# Patient Record
Sex: FEMALE | Race: BLACK OR AFRICAN AMERICAN | NOT HISPANIC OR LATINO | Employment: UNEMPLOYED | URBAN - METROPOLITAN AREA
[De-identification: names, ages, dates, MRNs, and addresses within clinical notes are randomized per-mention and may not be internally consistent; named-entity substitution may affect disease eponyms.]

---

## 2023-01-10 PROBLEM — Z90.3 S/P GASTRIC SLEEVE PROCEDURE: Status: ACTIVE | Noted: 2023-01-10

## 2023-01-10 PROBLEM — E78.5 HYPERLIPIDEMIA: Status: ACTIVE | Noted: 2023-01-10

## 2023-04-12 PROBLEM — E78.00 HYPERCHOLESTEREMIA: Status: ACTIVE | Noted: 2019-04-16

## 2023-04-12 PROBLEM — M54.41 CHRONIC BILATERAL LOW BACK PAIN WITH RIGHT-SIDED SCIATICA: Status: ACTIVE | Noted: 2022-10-21

## 2023-04-12 PROBLEM — E66.01 CLASS 2 SEVERE OBESITY DUE TO EXCESS CALORIES WITH SERIOUS COMORBIDITY AND BODY MASS INDEX (BMI) OF 35.0 TO 35.9 IN ADULT: Status: ACTIVE | Noted: 2023-04-12

## 2023-04-12 PROBLEM — G89.29 CHRONIC BILATERAL LOW BACK PAIN WITHOUT SCIATICA: Status: ACTIVE | Noted: 2022-10-21

## 2023-04-12 PROBLEM — Q61.00: Status: ACTIVE | Noted: 2023-04-12

## 2023-04-12 PROBLEM — E66.812 CLASS 2 SEVERE OBESITY DUE TO EXCESS CALORIES WITH SERIOUS COMORBIDITY AND BODY MASS INDEX (BMI) OF 35.0 TO 35.9 IN ADULT: Status: ACTIVE | Noted: 2023-04-12

## 2023-04-12 PROBLEM — M25.561 CHRONIC PAIN OF BOTH KNEES: Status: ACTIVE | Noted: 2022-04-22

## 2023-04-12 PROBLEM — M54.50 CHRONIC BILATERAL LOW BACK PAIN WITHOUT SCIATICA: Status: ACTIVE | Noted: 2022-10-21

## 2023-04-12 PROBLEM — J30.9 CHRONIC ALLERGIC RHINITIS: Status: ACTIVE | Noted: 2019-04-16

## 2023-04-12 PROBLEM — F41.8 MIXED ANXIETY AND DEPRESSIVE DISORDER: Status: ACTIVE | Noted: 2023-04-12

## 2023-04-12 PROBLEM — E88.810 METABOLIC SYNDROME: Status: ACTIVE | Noted: 2023-04-12

## 2023-06-13 PROBLEM — J32.9 SINUSITIS: Status: ACTIVE | Noted: 2023-06-13

## 2023-07-12 PROBLEM — E66.09 CLASS 1 OBESITY DUE TO EXCESS CALORIES WITH SERIOUS COMORBIDITY AND BODY MASS INDEX (BMI) OF 32.0 TO 32.9 IN ADULT: Status: ACTIVE | Noted: 2023-04-12

## 2023-07-12 PROBLEM — E66.811 CLASS 1 OBESITY DUE TO EXCESS CALORIES WITH SERIOUS COMORBIDITY AND BODY MASS INDEX (BMI) OF 32.0 TO 32.9 IN ADULT: Status: ACTIVE | Noted: 2023-04-12

## 2023-12-29 ENCOUNTER — OFFICE VISIT (OUTPATIENT)
Dept: FAMILY MEDICINE | Facility: CLINIC | Age: 58
End: 2023-12-29
Payer: COMMERCIAL

## 2023-12-29 VITALS
RESPIRATION RATE: 18 BRPM | TEMPERATURE: 98 F | OXYGEN SATURATION: 99 % | WEIGHT: 219.88 LBS | BODY MASS INDEX: 30.78 KG/M2 | HEIGHT: 71 IN | HEART RATE: 68 BPM

## 2023-12-29 DIAGNOSIS — B37.31 VAGINAL YEAST INFECTION: ICD-10-CM

## 2023-12-29 DIAGNOSIS — R32 URINARY INCONTINENCE, UNSPECIFIED TYPE: ICD-10-CM

## 2023-12-29 DIAGNOSIS — M25.561 CHRONIC PAIN OF RIGHT KNEE: ICD-10-CM

## 2023-12-29 DIAGNOSIS — G89.29 CHRONIC PAIN OF RIGHT KNEE: ICD-10-CM

## 2023-12-29 DIAGNOSIS — R30.0 DYSURIA: Primary | ICD-10-CM

## 2023-12-29 LAB
BILIRUB UR QL STRIP: NEGATIVE
CLARITY UR: CLEAR
COLOR UR: NORMAL
GLUCOSE UR QL STRIP: NEGATIVE
HGB UR QL STRIP: NEGATIVE
KETONES UR QL STRIP: NEGATIVE
LEUKOCYTE ESTERASE UR QL STRIP: NEGATIVE
NITRITE UR QL STRIP: NEGATIVE
PH UR STRIP: 7 [PH] (ref 5–8)
PROT UR QL STRIP: NEGATIVE
SP GR UR STRIP: 1.01 (ref 1–1.03)
URN SPEC COLLECT METH UR: NORMAL

## 2023-12-29 PROCEDURE — 99999 PR PBB SHADOW E&M-EST. PATIENT-LVL V: CPT | Mod: PBBFAC,,, | Performed by: NURSE PRACTITIONER

## 2023-12-29 PROCEDURE — 99214 OFFICE O/P EST MOD 30 MIN: CPT | Mod: S$PBB,,, | Performed by: NURSE PRACTITIONER

## 2023-12-29 PROCEDURE — 99215 OFFICE O/P EST HI 40 MIN: CPT | Mod: PBBFAC,PO | Performed by: NURSE PRACTITIONER

## 2023-12-29 PROCEDURE — 81003 URINALYSIS AUTO W/O SCOPE: CPT | Mod: PO | Performed by: NURSE PRACTITIONER

## 2023-12-29 RX ORDER — FLUCONAZOLE 150 MG/1
150 TABLET ORAL DAILY
Qty: 8 TABLET | Refills: 0 | Status: SHIPPED | OUTPATIENT
Start: 2023-12-29

## 2024-01-12 ENCOUNTER — OFFICE VISIT (OUTPATIENT)
Dept: ORTHOPEDICS | Facility: CLINIC | Age: 59
End: 2024-01-12
Payer: COMMERCIAL

## 2024-01-12 VITALS — HEIGHT: 71 IN | BODY MASS INDEX: 30.66 KG/M2 | WEIGHT: 219 LBS

## 2024-01-12 DIAGNOSIS — G89.29 CHRONIC PAIN OF RIGHT KNEE: Primary | ICD-10-CM

## 2024-01-12 DIAGNOSIS — M17.0 PRIMARY OSTEOARTHRITIS OF BOTH KNEES: ICD-10-CM

## 2024-01-12 DIAGNOSIS — M25.561 CHRONIC PAIN OF RIGHT KNEE: Primary | ICD-10-CM

## 2024-01-12 PROCEDURE — 20611 DRAIN/INJ JOINT/BURSA W/US: CPT | Mod: RT,S$GLB,, | Performed by: STUDENT IN AN ORGANIZED HEALTH CARE EDUCATION/TRAINING PROGRAM

## 2024-01-12 PROCEDURE — 99204 OFFICE O/P NEW MOD 45 MIN: CPT | Mod: 25,S$GLB,, | Performed by: STUDENT IN AN ORGANIZED HEALTH CARE EDUCATION/TRAINING PROGRAM

## 2024-01-12 PROCEDURE — 1160F RVW MEDS BY RX/DR IN RCRD: CPT | Mod: CPTII,S$GLB,, | Performed by: STUDENT IN AN ORGANIZED HEALTH CARE EDUCATION/TRAINING PROGRAM

## 2024-01-12 PROCEDURE — 3008F BODY MASS INDEX DOCD: CPT | Mod: CPTII,S$GLB,, | Performed by: STUDENT IN AN ORGANIZED HEALTH CARE EDUCATION/TRAINING PROGRAM

## 2024-01-12 PROCEDURE — 99999 PR PBB SHADOW E&M-EST. PATIENT-LVL IV: CPT | Mod: PBBFAC,,, | Performed by: STUDENT IN AN ORGANIZED HEALTH CARE EDUCATION/TRAINING PROGRAM

## 2024-01-12 PROCEDURE — 1159F MED LIST DOCD IN RCRD: CPT | Mod: CPTII,S$GLB,, | Performed by: STUDENT IN AN ORGANIZED HEALTH CARE EDUCATION/TRAINING PROGRAM

## 2024-01-12 RX ORDER — TRIAMCINOLONE ACETONIDE 40 MG/ML
40 INJECTION, SUSPENSION INTRA-ARTICULAR; INTRAMUSCULAR
Status: DISCONTINUED | OUTPATIENT
Start: 2024-01-12 | End: 2024-01-12 | Stop reason: HOSPADM

## 2024-01-12 RX ADMIN — TRIAMCINOLONE ACETONIDE 40 MG: 40 INJECTION, SUSPENSION INTRA-ARTICULAR; INTRAMUSCULAR at 09:01

## 2024-01-12 NOTE — PATIENT INSTRUCTIONS
Assessment:  Anel Johnson is a 58 y.o. female   Chief Complaint   Patient presents with    Right Knee - Pain       No diagnosis found.     Plan:  Ultrasound guided cortisone injection to the right knee  We discussed the proper protocols after the injection such as no submerging pools, baths tubs, or hot tubs for 24 hr.  Showering is okay today.  We also discussed that blood sugars can be elevated after an injection and asked patient to properly checked her sugars over the next few days and contact their PCP if there are any concerns.  We discussed red flags such as fevers, chills, red, warm, tender joint at the area of injection to please seek medical care immediately.    Apply topical diclofenac (Voltaren) up to 4 times a day to the affected area.  It can be bought over the counter at any local pharmacy.    Patient may ice every 2 hours for 15 minutes as needed to control pain and swelling.   Follow up as needed          Follow-up: as needed.    Thank you for choosing [facility] and Dr. Harry Talbot for your orthopedic & sports medicine care. It is our goal to provide you with exceptional care that will help keep you healthy, active, and get you back in the game.    Please do not hesitate to reach out to us via email, phone, or MyChart with any questions, concerns, or feedback.    If you felt that you received exemplary care today, please consider leaving us feedback on [email or web address] at:  [email or web address]    If you are experiencing pain/discomfort ,or have questions after 5pm and would like to be connected to the [facility] on-call team, please call this number and specify which Sports Medicine provider is treating you: [phone]

## 2024-01-12 NOTE — PROCEDURES
Large Joint Aspiration/Injection: R knee    Date/Time: 1/12/2024 9:20 AM    Performed by: Harry Talbot MD  Authorized by: Harry Talbot MD    Consent Done?:  Yes (Verbal)  Indications:  Arthritis and pain  Site marked: the procedure site was marked    Timeout: prior to procedure the correct patient, procedure, and site was verified    Prep: patient was prepped and draped in usual sterile fashion    Local anesthetic:  Bupivacaine 0.5% without epinephrine and lidocaine 1% without epinephrine    Details:  Needle Size:  21 G  Ultrasonic Guidance for needle placement?: Yes    Images are saved and documented.  Approach:  Lateral (superior)  Location:  Knee  Site:  R knee  Medications:  40 mg triamcinolone acetonide 40 mg/mL  Patient tolerance:  Patient tolerated the procedure well with no immediate complications     Ultrasound guidance was used for needle localization. Images were saved and stored for documentation. The appropriate structures were visualized. Dynamic visualization of the needle was continuous throughout the procedures and maintained good position.

## 2024-01-12 NOTE — PROCEDURES
Sports Medicine US - Guidance for Needle Placement    Date/Time: 1/12/2024 9:20 AM    Performed by: Harry Talbot MD  Authorized by: Harry Talbot MD  Preparation: Patient was prepped and draped in the usual sterile fashion.  Local anesthesia used: no    Anesthesia:  Local anesthesia used: no    Sedation:  Patient sedated: no    Patient tolerance: patient tolerated the procedure well with no immediate complications  Comments: Ultrasound guidance was used for needle localization. Images were saved and stored for documentation. The appropriate structures were visualized. Dynamic visualization of the needle was continuous throughout the procedures and maintained good position.

## 2024-01-12 NOTE — PROGRESS NOTES
Patient ID: Anel Johnson  YOB: 1965  MRN: [id number]    Chief Complaint: Pain of the Right Knee      Referred By: Jazzy Beckwith NP    History of Present Illness: Anel Johnson is a right-hand dominant 58 y.o. female who presents today with recurrent right knee pain.  She states she may have bumped her knee 2 months ago but is unsure. She states her pain is a 10 out of 10. She states walking, standing, makes the pain worse. She describes the pain as sharp pain. Dr. Christie gave her an injection of Depo & Lido on 9/18/23 in her knee. Seen Dr.. Martel on 9/28/23 for repeat injection, it was too soon, she received a knee brace and H.E.P, she is not wearing brace. She is here today requesting right knee steroid injection.      Occupation:       Past Medical History:   Past Medical History:   Diagnosis Date    Cancer     GERD (gastroesophageal reflux disease)     Hypertension      Past Surgical History:   Procedure Laterality Date    gastric sleeve      HYSTERECTOMY      OOPHORECTOMY       Family History   Problem Relation Age of Onset    Hypertension Mother     Hypertension Father      Social History     Socioeconomic History    Marital status: Single   Tobacco Use    Smoking status: Never    Smokeless tobacco: Never   Substance and Sexual Activity    Alcohol use: No    Drug use: No    Sexual activity: Not Currently     Medication List with Changes/Refills   Current Medications    AMLODIPINE (NORVASC) 5 MG TABLET    Take 1 tablet (5 mg total) by mouth once daily.    ATORVASTATIN (LIPITOR) 80 MG TABLET    Take 1 tablet (80 mg total) by mouth once daily.    AZELASTINE (ASTELIN) 137 MCG (0.1 %) NASAL SPRAY    1 spray by Nasal route.    CLOTRIMAZOLE-BETAMETHASONE 1-0.05% (LOTRISONE) CREAM    Apply topically 2 (two) times daily.    ESTRADIOL (ESTRACE) 0.01 % (0.1 MG/GRAM) VAGINAL CREAM    Place 3 g vaginally twice a week.    FLUCONAZOLE (DIFLUCAN) 150 MG TAB    Take 1 tablet (150 mg  "total) by mouth once daily.    LEVOCETIRIZINE (XYZAL) 5 MG TABLET    Take 1 tablet (5 mg total) by mouth every evening.    LIDOCAINE-PRILOCAINE (EMLA) CREAM    Apply topically as needed (leg pain).    LINACLOTIDE (LINZESS) 145 MCG CAP CAPSULE    Take 1 capsule (145 mcg total) by mouth before breakfast.    MONTELUKAST (SINGULAIR) 10 MG TABLET    Take 1 tablet (10 mg total) by mouth once daily.    NYSTATIN (MYCOSTATIN) POWDER    Apply topically 2 (two) times daily.    ONDANSETRON (ZOFRAN-ODT) 4 MG TBDL    Take 2 tablets (8 mg total) by mouth 2 (two) times daily.    OXYBUTYNIN (DITROPAN-XL) 5 MG TR24    Take 1 tablet (5 mg total) by mouth once daily.    PANTOPRAZOLE (PROTONIX) 40 MG TABLET    Take 1 tablet (40 mg total) by mouth once daily.    SIMETHICONE (MYLICON) 125 MG CAP CAPSULE    Take 1 capsule (125 mg total) by mouth 4 (four) times daily as needed for Flatulence.    SUMATRIPTAN (IMITREX) 50 MG TABLET    Take 1 tablet (50 mg total) by mouth once. for 1 dose    TIRZEPATIDE 7.5 MG/0.5 ML PNIJ    Inject 7.5 mg into the skin every 7 days.    TRIAMCINOLONE ACETONIDE 0.1% (KENALOG) 0.1 % CREAM    Apply topically 2 (two) times daily.    TRIAMTERENE-HYDROCHLOROTHIAZIDE 37.5-25 MG (DYAZIDE) 37.5-25 MG PER CAPSULE    Take 1 capsule by mouth every morning.     Review of patient's allergies indicates:   Allergen Reactions    Codeine     Duloxetine Other (See Comments)     Pt stated cymbalta made her feel "crazy" in head. Pt stated she is unable to describe it any other way.    Penicillins        Physical Exam:   Body mass index is 30.54 kg/m².    GENERAL: Well appearing, in no acute distress.  HEAD: Normocephalic and atraumatic.  ENT: External ears and nose grossly normal.  EYES: EOMI bilaterally  PULMONARY: Respirations are grossly even and non-labored.  NEURO: Awake, alert, and oriented x 3.  SKIN: No obvious rashes appreciated.  PSYCH: Mood & affect are appropriate.    Detailed MSK exam:     Right knee exam:   -ROM: " extension 0, flexion 120  -TTP: Medial joint line and Lateral joint line  -effusion: trace  -Patellar apprehension negative  -Yvonne test negative  -stable to varus and valgus stress tests  -Lachman test difficult to assess, anterior drawer test negative, posterior drawer test negative    Left knee exam:   -ROM: extension 0, flexion 130  -TTP: None  -effusion: none  -Patellar apprehension negative  -Yvonne test negative  -stable to varus and valgus stress tests  -Lachman test difficult to assess, anterior drawer test negative, posterior drawer test negative      Imaging:  X-Ray Knee 1 or 2 View Right  Narrative: EXAMINATION:  XR KNEE 1 OR 2 VIEW RIGHT    CLINICAL HISTORY:  Pain in right knee    TECHNIQUE:  AP and lateral views of the right knee were performed.    COMPARISON:  09/18/2023    FINDINGS:  Minimal joint space narrowing seen involving the medial compartment of either knee.  No loose bodies are suggested.  Impression: As above    Electronically signed by: Byron Rachel DO  Date:    09/28/2023  Time:    09:50        Relevant imaging results were reviewed and interpreted by me and per my read shows mild arthritic changes.  This was discussed with the patient and / or family today.     Assessment:  Anel Johnson is a 58 y.o. female presenting with chronic right knee pain.   History, physical and radiographs are consistent with a likely diagnosis of OA.   Plan: Steroid injection given today (see separate procedure note for details). We discussed the proper protocols after the injection such as no submerging pools, baths tubs, or hot tubs for 24 hr.  Showering is okay today.  We also discussed that blood sugars can be elevated after an injection and asked patient to properly checked her sugars over the next few days and contact their PCP if there are any concerns.  We discussed red flags such as fevers, chills, red, warm, tender joint at the area of injection to please seek medical care immediately.    Consider gel injection if not improving. Continue conservative management for pain.   Follow up as needed. All questions answered.      Chronic pain of right knee  -     Sports Medicine US - Guidance for Needle Placement    Primary osteoarthritis of both knees  -     Large Joint Aspiration/Injection: R knee         MEDICAL NECESSITY FOR VISCOSUPPLEMENTATION: After thorough evaluation of the patient, I have determined that visco-supplementation is medically necessary. The patient has painful degenerative changes of the knee with failure of conservative treatments including lifestyle modifications and rehabilitation exercises.  Oral analgesis/NSAIDs have not adequately controlled symptoms and there is radiographic evidence of Kellgren Nick grade 2 or greater osteoarthritic changes, or in lack of radiographic evidence, there is arthroscopic or other evidence of chondrosis.      A copy of today's visit note has been sent to the referring provider.     Electronically signed:  Harry Talbot MD, MPH  01/12/2024  9:33 AM

## 2024-01-23 NOTE — PROGRESS NOTES
Subjective:       Patient ID: Anel Johnson is a 58 y.o. female.    Chief Complaint: Dysuria    Urinary Tract Infection   This is a new problem. The current episode started in the past 7 days. The problem occurs every urination. The problem has been gradually worsening. The quality of the pain is described as burning. The pain is moderate. Associated symptoms include frequency. Pertinent negatives include no vomiting or rash. Associated symptoms comments: Chronic urinary incontinence . Treatments tried: oxybutynin. The treatment provided mild relief.   Knee Pain   The incident occurred more than 1 week ago. The injury mechanism was a direct blow. The pain is present in the right knee. The quality of the pain is described as aching. The pain is moderate. The pain has been Fluctuating since onset. Associated symptoms include a loss of motion and muscle weakness. The symptoms are aggravated by weight bearing. Treatments tried: seen in the past and given knee injection. The treatment provided moderate relief.       Review of Systems   Constitutional:  Negative for fatigue, fever and unexpected weight change.   HENT:  Negative for ear pain and sore throat.    Eyes:  Negative for pain and visual disturbance.   Respiratory:  Negative for cough and shortness of breath.    Cardiovascular:  Negative for chest pain and palpitations.   Gastrointestinal:  Negative for abdominal pain, diarrhea and vomiting.   Genitourinary:  Positive for dysuria and frequency.   Musculoskeletal:  Positive for arthralgias and gait problem. Negative for myalgias.   Skin:  Negative for color change and rash.   Neurological:  Negative for dizziness and headaches.   Psychiatric/Behavioral:  Negative for dysphoric mood and sleep disturbance. The patient is not nervous/anxious.        Vitals:    12/29/23 0851   Pulse: 68   Resp: 18   Temp: 98.1 °F (36.7 °C)       Objective:     Current Outpatient Medications   Medication Sig Dispense Refill     amLODIPine (NORVASC) 5 MG tablet Take 1 tablet (5 mg total) by mouth once daily. 90 tablet 3    atorvastatin (LIPITOR) 80 MG tablet Take 1 tablet (80 mg total) by mouth once daily. 90 tablet 3    azelastine (ASTELIN) 137 mcg (0.1 %) nasal spray 1 spray by Nasal route.      clotrimazole-betamethasone 1-0.05% (LOTRISONE) cream Apply topically 2 (two) times daily. 45 g 0    estradioL (ESTRACE) 0.01 % (0.1 mg/gram) vaginal cream Place 3 g vaginally twice a week. 24 g 11    levocetirizine (XYZAL) 5 MG tablet Take 1 tablet (5 mg total) by mouth every evening. 90 tablet 3    LIDOcaine-prilocaine (EMLA) cream Apply topically as needed (leg pain). 30 g 11    linaCLOtide (LINZESS) 145 mcg Cap capsule Take 1 capsule (145 mcg total) by mouth before breakfast. 90 capsule 1    montelukast (SINGULAIR) 10 mg tablet Take 1 tablet (10 mg total) by mouth once daily. 90 tablet 3    nystatin (MYCOSTATIN) powder Apply topically 2 (two) times daily. 60 g 11    ondansetron (ZOFRAN-ODT) 4 MG TbDL Take 2 tablets (8 mg total) by mouth 2 (two) times daily. 20 tablet 1    oxybutynin (DITROPAN-XL) 5 MG TR24 Take 1 tablet (5 mg total) by mouth once daily. 90 tablet 3    pantoprazole (PROTONIX) 40 MG tablet Take 1 tablet (40 mg total) by mouth once daily. 90 tablet 3    simethicone (MYLICON) 125 mg Cap capsule Take 1 capsule (125 mg total) by mouth 4 (four) times daily as needed for Flatulence. 90 capsule 2    tirzepatide 7.5 mg/0.5 mL PnIj Inject 7.5 mg into the skin every 7 days. 1 pen 11    triamcinolone acetonide 0.1% (KENALOG) 0.1 % cream Apply topically 2 (two) times daily. 28.4 g 1    triamterene-hydrochlorothiazide 37.5-25 mg (DYAZIDE) 37.5-25 mg per capsule Take 1 capsule by mouth every morning. 90 capsule 1    fluconazole (DIFLUCAN) 150 MG Tab Take 1 tablet (150 mg total) by mouth once daily. 8 tablet 0    sumatriptan (IMITREX) 50 MG tablet Take 1 tablet (50 mg total) by mouth once. for 1 dose 10 tablet 0     No current  facility-administered medications for this visit.       Physical Exam  Vitals and nursing note reviewed.   Constitutional:       General: She is not in acute distress.     Appearance: She is well-developed.   HENT:      Head: Normocephalic and atraumatic.   Eyes:      Pupils: Pupils are equal, round, and reactive to light.   Cardiovascular:      Rate and Rhythm: Regular rhythm.   Pulmonary:      Effort: Pulmonary effort is normal.      Breath sounds: Normal breath sounds.   Abdominal:      General: Bowel sounds are normal.      Palpations: Abdomen is soft.      Tenderness: There is no abdominal tenderness. There is no right CVA tenderness or left CVA tenderness.   Musculoskeletal:      Cervical back: Normal range of motion and neck supple.      Right knee: No deformity. Decreased range of motion.   Skin:     General: Skin is warm and dry.      Findings: No rash.   Neurological:      Mental Status: She is alert and oriented to person, place, and time.   Psychiatric:         Judgment: Judgment normal.         Lab Results   Component Value Date    COLORU Straw 12/29/2023    APPEARANCEUA Clear 12/29/2023    GLUCUA Negative 12/29/2023    SPECGRAV 1.010 12/29/2023    PHUR 7.0 12/29/2023    NITRITE Negative 12/29/2023    KETONESU Negative 12/29/2023    BILIRUBINUA Negative 12/29/2023    OCCULTUA Negative 12/29/2023    LEUKOCYTESUR Negative 12/29/2023       Lab Results   Component Value Date    RBCUA 0 11/07/2023    WBCUA 2 11/07/2023    BACTERIA Few (A) 11/07/2023    SQUAMEPITHEL 8 11/07/2023    MICROCMT SEE COMMENT 11/07/2023       Assessment:       1. Dysuria    2. Urinary incontinence, unspecified type    3. Vaginal yeast infection    4. Chronic pain of right knee        Plan:   Dysuria  -     Urinalysis, Reflex to Urine Culture Urine, Clean Catch    Urinary incontinence, unspecified type  -     Ambulatory referral/consult to Urology; Future; Expected date: 01/05/2024    Vaginal yeast infection  -     fluconazole  (DIFLUCAN) 150 MG Tab; Take 1 tablet (150 mg total) by mouth once daily.  Dispense: 8 tablet; Refill: 0    Chronic pain of right knee  - APPT scheduled with ortho            No follow-ups on file.    There are no Patient Instructions on file for this visit.

## 2024-03-26 DIAGNOSIS — E66.01 CLASS 2 SEVERE OBESITY DUE TO EXCESS CALORIES WITH SERIOUS COMORBIDITY AND BODY MASS INDEX (BMI) OF 35.0 TO 35.9 IN ADULT: ICD-10-CM

## 2024-03-26 DIAGNOSIS — R73.03 PREDIABETES: ICD-10-CM

## 2024-03-26 DIAGNOSIS — E88.810 METABOLIC SYNDROME: ICD-10-CM

## 2024-03-26 NOTE — TELEPHONE ENCOUNTER
No care due was identified.  [facility] Embedded Care Due Messages. Reference number: [id number].   3/26/2024 1:17:20 PM CDT

## 2024-03-27 DIAGNOSIS — R73.03 PREDIABETES: ICD-10-CM

## 2024-03-27 DIAGNOSIS — E88.810 METABOLIC SYNDROME: ICD-10-CM

## 2024-03-27 RX ORDER — TIRZEPATIDE 2.5 MG/.5ML
INJECTION, SOLUTION SUBCUTANEOUS
Refills: 4 | OUTPATIENT
Start: 2024-03-27

## 2024-03-27 RX ORDER — TIRZEPATIDE 5 MG/.5ML
INJECTION, SOLUTION SUBCUTANEOUS
Qty: 1 PEN | Refills: 4 | Status: SHIPPED | OUTPATIENT
Start: 2024-03-27 | End: 2024-04-22 | Stop reason: SDUPTHER

## 2024-03-27 RX ORDER — AZELASTINE 1 MG/ML
SPRAY, METERED NASAL
Qty: 30 ML | Refills: 3 | Status: SHIPPED | OUTPATIENT
Start: 2024-03-27

## 2024-03-27 NOTE — TELEPHONE ENCOUNTER
Refill Routing Note   Medication(s) are not appropriate for processing by [facility] for the following reason(s):        No active prescription written by provider    [facility] action(s):  Defer  Quick Discontinue        Medication Therapy Plan: Mounjaro 2.5 mg was discontinued on 5/2/2023 by Irene Christie MD      Appointments  past 12m or future 3m with PCP    Date Provider   Last Visit   11/7/2023 Irene Christie MD   Next Visit   Visit date not found Irene Christie MD   ED visits in past 90 days: 0        Note composed:12:02 PM 03/27/2024

## 2024-03-27 NOTE — TELEPHONE ENCOUNTER
----- Message from Kodi Moss sent at 3/27/2024  1:42 PM CDT -----  Name of Who is Calling:KIRBY DRUMMOND [[id number]]        What is the request in detail:Pt would like request a prescription change for the following medication tirzepatide 7.5 mg/0.5 mL Pn pharmacy does not have 7.5,mg only 5mg in stock.Please advise thank you   .  [facility] - [place], [place] - [address]  [address] [facility] [address]  Phone: [phone] Fax: [phone]        Can the clinic reply by [facility]:NO        What Number to Call Back if not in [facility]:.Telephone Information:  Mobile          [phone]

## 2024-03-27 NOTE — TELEPHONE ENCOUNTER
No care due was identified.  [facility] Embedded Care Due Messages. Reference number: [id number].   3/27/2024 2:02:16 PM CDT

## 2024-04-18 ENCOUNTER — LAB VISIT (OUTPATIENT)
Dept: LAB | Facility: HOSPITAL | Age: 59
End: 2024-04-18
Payer: COMMERCIAL

## 2024-04-18 DIAGNOSIS — R73.03 PREDIABETES: ICD-10-CM

## 2024-04-18 DIAGNOSIS — I10 ESSENTIAL HYPERTENSION: ICD-10-CM

## 2024-04-18 LAB
ESTIMATED AVG GLUCOSE: 111 MG/DL (ref 68–131)
HBA1C MFR BLD: 5.5 % (ref 4–5.6)

## 2024-04-18 PROCEDURE — 83036 HEMOGLOBIN GLYCOSYLATED A1C: CPT | Performed by: STUDENT IN AN ORGANIZED HEALTH CARE EDUCATION/TRAINING PROGRAM

## 2024-04-18 PROCEDURE — 36415 COLL VENOUS BLD VENIPUNCTURE: CPT | Mod: PO | Performed by: STUDENT IN AN ORGANIZED HEALTH CARE EDUCATION/TRAINING PROGRAM

## 2024-04-19 NOTE — PROGRESS NOTES
I have sent a msg to patient with the following interpretation (see below):    A1c normal     Please do not hesitate to call or message with any questions or concerns    Irene Christie MD

## 2024-04-22 ENCOUNTER — OFFICE VISIT (OUTPATIENT)
Dept: FAMILY MEDICINE | Facility: CLINIC | Age: 59
End: 2024-04-22
Payer: COMMERCIAL

## 2024-04-22 VITALS
SYSTOLIC BLOOD PRESSURE: 117 MMHG | HEIGHT: 71 IN | OXYGEN SATURATION: 98 % | TEMPERATURE: 98 F | RESPIRATION RATE: 18 BRPM | WEIGHT: 215.38 LBS | DIASTOLIC BLOOD PRESSURE: 82 MMHG | BODY MASS INDEX: 30.15 KG/M2 | HEART RATE: 64 BPM

## 2024-04-22 DIAGNOSIS — I10 ESSENTIAL HYPERTENSION: ICD-10-CM

## 2024-04-22 DIAGNOSIS — R73.03 PREDIABETES: ICD-10-CM

## 2024-04-22 DIAGNOSIS — E88.810 METABOLIC SYNDROME: ICD-10-CM

## 2024-04-22 DIAGNOSIS — Z12.31 ENCOUNTER FOR SCREENING MAMMOGRAM FOR MALIGNANT NEOPLASM OF BREAST: ICD-10-CM

## 2024-04-22 DIAGNOSIS — E66.09 CLASS 1 OBESITY DUE TO EXCESS CALORIES WITH SERIOUS COMORBIDITY AND BODY MASS INDEX (BMI) OF 30.0 TO 30.9 IN ADULT: Primary | ICD-10-CM

## 2024-04-22 DIAGNOSIS — L30.4 PRURITIC INTERTRIGO: ICD-10-CM

## 2024-04-22 PROBLEM — E66.811 CLASS 1 OBESITY DUE TO EXCESS CALORIES WITH SERIOUS COMORBIDITY AND BODY MASS INDEX (BMI) OF 30.0 TO 30.9 IN ADULT: Status: ACTIVE | Noted: 2024-04-22

## 2024-04-22 PROBLEM — E66.812 CLASS 2 SEVERE OBESITY DUE TO EXCESS CALORIES WITH SERIOUS COMORBIDITY AND BODY MASS INDEX (BMI) OF 35.0 TO 35.9 IN ADULT: Status: ACTIVE | Noted: 2024-04-22

## 2024-04-22 PROBLEM — E66.01 CLASS 2 SEVERE OBESITY DUE TO EXCESS CALORIES WITH SERIOUS COMORBIDITY AND BODY MASS INDEX (BMI) OF 35.0 TO 35.9 IN ADULT: Status: ACTIVE | Noted: 2024-04-22

## 2024-04-22 PROCEDURE — 99214 OFFICE O/P EST MOD 30 MIN: CPT | Mod: S$GLB,,, | Performed by: STUDENT IN AN ORGANIZED HEALTH CARE EDUCATION/TRAINING PROGRAM

## 2024-04-22 PROCEDURE — 1160F RVW MEDS BY RX/DR IN RCRD: CPT | Mod: CPTII,S$GLB,, | Performed by: STUDENT IN AN ORGANIZED HEALTH CARE EDUCATION/TRAINING PROGRAM

## 2024-04-22 PROCEDURE — 3074F SYST BP LT 130 MM HG: CPT | Mod: CPTII,S$GLB,, | Performed by: STUDENT IN AN ORGANIZED HEALTH CARE EDUCATION/TRAINING PROGRAM

## 2024-04-22 PROCEDURE — 3044F HG A1C LEVEL LT 7.0%: CPT | Mod: CPTII,S$GLB,, | Performed by: STUDENT IN AN ORGANIZED HEALTH CARE EDUCATION/TRAINING PROGRAM

## 2024-04-22 PROCEDURE — 3008F BODY MASS INDEX DOCD: CPT | Mod: CPTII,S$GLB,, | Performed by: STUDENT IN AN ORGANIZED HEALTH CARE EDUCATION/TRAINING PROGRAM

## 2024-04-22 PROCEDURE — 3079F DIAST BP 80-89 MM HG: CPT | Mod: CPTII,S$GLB,, | Performed by: STUDENT IN AN ORGANIZED HEALTH CARE EDUCATION/TRAINING PROGRAM

## 2024-04-22 PROCEDURE — 1159F MED LIST DOCD IN RCRD: CPT | Mod: CPTII,S$GLB,, | Performed by: STUDENT IN AN ORGANIZED HEALTH CARE EDUCATION/TRAINING PROGRAM

## 2024-04-22 PROCEDURE — 99999 PR PBB SHADOW E&M-EST. PATIENT-LVL V: CPT | Mod: PBBFAC,,, | Performed by: STUDENT IN AN ORGANIZED HEALTH CARE EDUCATION/TRAINING PROGRAM

## 2024-04-22 PROCEDURE — G2211 COMPLEX E/M VISIT ADD ON: HCPCS | Mod: S$GLB,,, | Performed by: STUDENT IN AN ORGANIZED HEALTH CARE EDUCATION/TRAINING PROGRAM

## 2024-04-22 RX ORDER — TIRZEPATIDE 5 MG/.5ML
5 INJECTION, SOLUTION SUBCUTANEOUS
Qty: 1 PEN | Refills: 4 | Status: SHIPPED | OUTPATIENT
Start: 2024-04-22 | End: 2024-04-30 | Stop reason: SDUPTHER

## 2024-04-22 RX ORDER — HYDROCHLOROTHIAZIDE 12.5 MG/1
12.5 TABLET ORAL DAILY
Qty: 90 TABLET | Refills: 3 | Status: SHIPPED | OUTPATIENT
Start: 2024-04-22 | End: 2025-04-22
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
## 2024-04-22 NOTE — PATIENT INSTRUCTIONS
Johnathan Tam,     If you are due for any health screening(s) below please notify me so we can arrange them to be ordered and scheduled. Most healthy patients at your age complete them, but you are free to accept or refuse.     If you can't do it, I'll definitely understand. If you can, I'd certainly appreciate it!    All of your core healthy metrics are met.

## 2024-04-23 ENCOUNTER — TELEPHONE (OUTPATIENT)
Dept: FAMILY MEDICINE | Facility: CLINIC | Age: 59
End: 2024-04-23
Payer: COMMERCIAL

## 2024-04-23 NOTE — TELEPHONE ENCOUNTER
----- Message from Anyi Miguel MA sent at 4/23/2024  1:46 PM CDT -----  Contact: blanka@ [phone]  Pt called              Pt is needing a PA for medication tirzepatide (MOUNJARO) 5 mg/0.5 mL PnIj and to be sent to pharmacy below. Aslo pt stated that she's currently out of medication.              [facility]    [address] [place] [address]    Phone number:[phone]

## 2024-04-25 ENCOUNTER — TELEPHONE (OUTPATIENT)
Dept: FAMILY MEDICINE | Facility: CLINIC | Age: 59
End: 2024-04-25
Payer: COMMERCIAL

## 2024-04-25 NOTE — TELEPHONE ENCOUNTER
----- Message from Rolando Green sent at 4/25/2024  1:06 PM CDT -----  Contact: self  Pt is asking for an return call in reference to needing an PA on medication tirzepatide (MOUNJARO) 5 mg/0.5 mL Colleen ,  pt states she has been out of medication please call back at .[phone] Thx CJ

## 2024-04-25 NOTE — TELEPHONE ENCOUNTER
Please assist with PA  tirzepatide (MOUNJARO) 5 mg/0.5 mL PnIj     Diagnoses  Metabolic syndrome [E88.810]  Prediabetes [R73.03]

## 2024-04-26 ENCOUNTER — TELEPHONE (OUTPATIENT)
Dept: FAMILY MEDICINE | Facility: CLINIC | Age: 59
End: 2024-04-26
Payer: COMMERCIAL

## 2024-04-26 NOTE — TELEPHONE ENCOUNTER
----- Message from Elisabeth Napier sent at 4/26/2024  2:04 PM CDT -----  Regarding: pharmacy change  Name of who is calling:   Anel      What is the request in detail: Pt is requesting a call back in ref to rx for Mounjaro 10 mgs be sent [facility] in [place] / need prior auth / pt is completely out      Can the clinic reply by [facility]:no      What number to call back if not [facility]: [phone]

## 2024-04-29 ENCOUNTER — TELEPHONE (OUTPATIENT)
Dept: ORTHOPEDICS | Facility: CLINIC | Age: 59
End: 2024-04-29

## 2024-04-29 ENCOUNTER — TELEPHONE (OUTPATIENT)
Dept: FAMILY MEDICINE | Facility: CLINIC | Age: 59
End: 2024-04-29
Payer: COMMERCIAL

## 2024-04-29 ENCOUNTER — OFFICE VISIT (OUTPATIENT)
Dept: ORTHOPEDICS | Facility: CLINIC | Age: 59
End: 2024-04-29
Payer: COMMERCIAL

## 2024-04-29 VITALS — BODY MASS INDEX: 30.15 KG/M2 | WEIGHT: 215.38 LBS | HEIGHT: 71 IN

## 2024-04-29 DIAGNOSIS — E78.5 HYPERLIPIDEMIA, UNSPECIFIED HYPERLIPIDEMIA TYPE: ICD-10-CM

## 2024-04-29 DIAGNOSIS — M25.561 CHRONIC PAIN OF RIGHT KNEE: ICD-10-CM

## 2024-04-29 DIAGNOSIS — M17.0 PRIMARY OSTEOARTHRITIS OF BOTH KNEES: Primary | ICD-10-CM

## 2024-04-29 DIAGNOSIS — G89.29 CHRONIC PAIN OF RIGHT KNEE: ICD-10-CM

## 2024-04-29 PROCEDURE — 3044F HG A1C LEVEL LT 7.0%: CPT | Mod: CPTII,S$GLB,, | Performed by: STUDENT IN AN ORGANIZED HEALTH CARE EDUCATION/TRAINING PROGRAM

## 2024-04-29 PROCEDURE — 1159F MED LIST DOCD IN RCRD: CPT | Mod: CPTII,S$GLB,, | Performed by: STUDENT IN AN ORGANIZED HEALTH CARE EDUCATION/TRAINING PROGRAM

## 2024-04-29 PROCEDURE — G2211 COMPLEX E/M VISIT ADD ON: HCPCS | Mod: S$GLB,,, | Performed by: STUDENT IN AN ORGANIZED HEALTH CARE EDUCATION/TRAINING PROGRAM

## 2024-04-29 PROCEDURE — 1160F RVW MEDS BY RX/DR IN RCRD: CPT | Mod: CPTII,S$GLB,, | Performed by: STUDENT IN AN ORGANIZED HEALTH CARE EDUCATION/TRAINING PROGRAM

## 2024-04-29 PROCEDURE — 3008F BODY MASS INDEX DOCD: CPT | Mod: CPTII,S$GLB,, | Performed by: STUDENT IN AN ORGANIZED HEALTH CARE EDUCATION/TRAINING PROGRAM

## 2024-04-29 PROCEDURE — 99214 OFFICE O/P EST MOD 30 MIN: CPT | Mod: S$GLB,,, | Performed by: STUDENT IN AN ORGANIZED HEALTH CARE EDUCATION/TRAINING PROGRAM

## 2024-04-29 PROCEDURE — 99999 PR PBB SHADOW E&M-EST. PATIENT-LVL V: CPT | Mod: PBBFAC,,, | Performed by: STUDENT IN AN ORGANIZED HEALTH CARE EDUCATION/TRAINING PROGRAM

## 2024-04-29 NOTE — TELEPHONE ENCOUNTER
Pt is being seen in the office now. Our  staff has been informed to collect all demographic information to complete PA. Marie Hood has been notified to send a message to staff once all demographic information is updated

## 2024-04-29 NOTE — TELEPHONE ENCOUNTER
I spoke with patient, informed her that [facility] does not take her secondary insurance.  She stated that she does not [facility] to [facility] to [facility].  She wants to go to [facility] [address] in [place].  She said she's referring to [facility].  Referral faxed to [facility]

## 2024-04-29 NOTE — PATIENT INSTRUCTIONS
Assessment:  Anel Schmitt is a 59 y.o. female   Chief Complaint   Patient presents with    Right Knee - Pain       Encounter Diagnoses   Name Primary?    Primary osteoarthritis of both knees Yes    Chronic pain of right knee         Plan:  Pre authorization for one series visco injection to right knee - Synvisc One  Referral to physical therapy at [facility] in [place]  Apply topical diclofenac (Voltaren) up to 4 times a day to the affected area.  It can be bought over the counter at any local pharmacy.    Patient may ice every 2 hours for 15 minutes as needed to control pain and swelling.   Follow up in 4 weeks for gel injection        General Arthritis info:    -shiny white stuff at end of a chicken bones is cartilage    -arthritis is wearing away of the cartilage that lines the end of your bones    -osteoarthritis is thought to be a wear and tear phenomenon    -symptoms are due to inflammation of joint causing stiffness, aching, and sometimes swelling    -occasionally sharp pain will occur causing a give way sensation    -Risk factors: genetic, weight, female > male, age    Treatment options:    -maintain healthy weight (every pound is 4 pounds of pressure on the knee)    -daily moderate exercise (walk, bike, swim 30 minutes per day) to keep joints moving    -daily strengthening exercises (through therapy or on own) to keep muscles supporting joint healthy and strong    -glucosamine 1500mg daily (look for USP label on bottle)    -tylenol as needed for pain (follow directions on the bottle)    -anti-inflammatory medication such as alleve may be helpful- take 1-2 tabs twice daily for 7 days. If it helps your pain, continue. If you do not feel any change, you may stop and then take it as needed.    (you may be given a once daily anti-inflammatory such as MOBIC. If given, avoid other anti-inflammatory medications such as advil, ibuprofen, motrin, naprosyn, alleve, etc)    -if swollen and  painful, ice, decrease activity, and take anti-inflammatory daily for 5-7 days and if no relief call your doctor for further options    -consider cortisone injection (every 3-4 months at most)- anti- inflammatory steroid medication that can be injected directly into the joint to reduce inflammation    -consider hyaluronic acid injections (eufflexxa, hyalgan, synvisc, supartz) (every 6 months at most)- protein injection that helps decrease pain and irritability in the joint. It is best used to help prolong intervals between cortisone injections to minimize steroid injections. These are currently approved for knee injections. Discuss with your doctor if other joint involved. Call to seek approval prior to the injections.    -long-term treatment may include a total joint replacement (keep diary of good days and bad days, then evaluate as to when you are ready)      Follow-up: for gel injections.    Thank you for choosing [facility] and Dr. Harry Talbot for your orthopedic & sports medicine care. It is our goal to provide you with exceptional care that will help keep you healthy, active, and get you back in the game.    Please do not hesitate to reach out to us via email, phone, or MyChart with any questions, concerns, or feedback.    If you felt that you received exemplary care today, please consider leaving us feedback on [email or web address] at:  [email or web address]    If you are experiencing pain/discomfort ,or have questions after 5pm and would like to be connected to the [facility] on-call team, please call this number and specify which Sports Medicine provider is treating you: [phone]

## 2024-04-29 NOTE — TELEPHONE ENCOUNTER
No care due was identified.  [facility] Embedded Care Due Messages. Reference number: [id number].   4/29/2024 10:37:40 AM CDT

## 2024-04-29 NOTE — PROGRESS NOTES
Patient ID: Anel Schmitt  YOB: 1965  MRN: [id number]    Chief Complaint: Pain of the Right Knee      Referred By: Jazzy Beckwith NP      History of Present Illness: Anel Johnson is a right-hand dominant 58 y.o. female who presents today with recurrent right knee pain.  She states she may have bumped her knee 2 months ago but is unsure. She states her pain is a 8 out of 10. She states walking, standing, makes the pain worse. She describes the pain as sharp pain. Dr. Talbot gave her an injection of Depo & Lido on 9/18/23 in her knee. Seen Dr.. Martel on 9/28/23 for repeat injection, it was too soon, she received a knee brace and H.E.P, she is not wearing brace. She is here today to discuss gel injection.        History of Present Illness: Anel Schmitt is a right-hand dominant 59 y.o. female who presents today with recurrent right knee pain.  She states she may have bumped her knee 2 months ago but is unsure. She states her pain is a 10 out of 10. She states walking, standing, makes the pain worse. She describes the pain as sharp pain. Dr. Christie gave her an injection of Depo & Lido on 9/18/23 in her knee. Seen Dr.. Martel on 9/28/23 for repeat injection, it was too soon, she received a knee brace and H.E.P, she is not wearing brace. She is here today requesting right knee steroid injection.      Occupation:       Past Medical History:   Past Medical History:   Diagnosis Date    Cancer     GERD (gastroesophageal reflux disease)     Hypertension      Past Surgical History:   Procedure Laterality Date    gastric sleeve      HYSTERECTOMY      OOPHORECTOMY       Family History   Problem Relation Name Age of Onset    Hypertension Mother      Hypertension Father       Social History     Socioeconomic History    Marital status: Single   Tobacco Use    Smoking status: Never    Smokeless tobacco: Never   Substance and Sexual Activity    Alcohol use: No    Drug use: No     Sexual activity: Not Currently   Social History Narrative    ** Merged History Encounter **          Medication List with Changes/Refills   Current Medications    ATORVASTATIN (LIPITOR) 80 MG TABLET    Take 1 tablet (80 mg total) by mouth once daily.    AZELASTINE (ASTELIN) 137 MCG (0.1 %) NASAL SPRAY    INHALE 1 SPRAY INTO EACH NOSTRIL 1 TIME PER DAY FOR 14 DAYS *THANK YOU*    CLOTRIMAZOLE-BETAMETHASONE 1-0.05% (LOTRISONE) CREAM    Apply topically 2 (two) times daily.    ESTRADIOL (ESTRACE) 0.01 % (0.1 MG/GRAM) VAGINAL CREAM    Place 3 g vaginally twice a week.    FLUCONAZOLE (DIFLUCAN) 150 MG TAB    Take 1 tablet (150 mg total) by mouth once daily.    HYDROCHLOROTHIAZIDE (HYDRODIURIL) 12.5 MG TAB    Take 1 tablet (12.5 mg total) by mouth once daily.    LEVOCETIRIZINE (XYZAL) 5 MG TABLET    Take 1 tablet (5 mg total) by mouth every evening.    LIDOCAINE-PRILOCAINE (EMLA) CREAM    Apply topically as needed (leg pain).    LINACLOTIDE (LINZESS) 145 MCG CAP CAPSULE    Take 1 capsule (145 mcg total) by mouth before breakfast.    MONTELUKAST (SINGULAIR) 10 MG TABLET    Take 1 tablet (10 mg total) by mouth once daily.    NYSTATIN (MYCOSTATIN) POWDER    Apply topically 2 (two) times daily.    ONDANSETRON (ZOFRAN-ODT) 4 MG TBDL    Take 2 tablets (8 mg total) by mouth 2 (two) times daily.    OXYBUTYNIN (DITROPAN-XL) 5 MG TR24    Take 1 tablet (5 mg total) by mouth once daily.    PANTOPRAZOLE (PROTONIX) 40 MG TABLET    Take 1 tablet (40 mg total) by mouth once daily.    SIMETHICONE (MYLICON) 125 MG CAP CAPSULE    Take 1 capsule (125 mg total) by mouth 4 (four) times daily as needed for Flatulence.    SUMATRIPTAN (IMITREX) 50 MG TABLET    Take 1 tablet (50 mg total) by mouth once. for 1 dose    TIRZEPATIDE (MOUNJARO) 5 MG/0.5 ML PNIJ    Inject 5 mg into the skin every 7 days.    TRIAMCINOLONE ACETONIDE 0.1% (KENALOG) 0.1 % CREAM    Apply topically 2 (two) times daily.     Review of patient's allergies indicates:   Allergen  "Reactions    Codeine     Duloxetine Other (See Comments)     Pt stated cymbalta made her feel "crazy" in head. Pt stated she is unable to describe it any other way.    Penicillins        Physical Exam:   Body mass index is 30.04 kg/m².    GENERAL: Well appearing, in no acute distress.  HEAD: Normocephalic and atraumatic.  ENT: External ears and nose grossly normal.  EYES: EOMI bilaterally  PULMONARY: Respirations are grossly even and non-labored.  NEURO: Awake, alert, and oriented x 3.  SKIN: No obvious rashes appreciated.  PSYCH: Mood & affect are appropriate.    Detailed MSK exam:     Right knee exam:   -ROM: extension 0, flexion 120  -TTP: None  -effusion: trace  -Patellar apprehension negative  -Yvonne test negative  -stable to varus and valgus stress tests  -Lachman test difficult to assess, anterior drawer test negative, posterior drawer test negative    Left knee exam:   -ROM: extension 0, flexion 130  -TTP: None  -effusion: none  -Patellar apprehension negative  -Yvonne test negative  -stable to varus and valgus stress tests  -Lachman test difficult to assess, anterior drawer test negative, posterior drawer test negative      Imaging:  Sports Medicine US - Guidance for Needle Placement  Harry Talbot MD     1/12/2024 10:08 AM  Sports Medicine US - Guidance for Needle Placement    Date/Time: 1/12/2024 9:20 AM    Performed by: Harry Talbot MD  Authorized by: Harry Talbot MD  Preparation: Patient was prepped and   draped in the usual sterile fashion.  Local anesthesia used: no    Anesthesia:  Local anesthesia used: no    Sedation:  Patient sedated: no    Patient tolerance: patient tolerated the procedure well with no immediate   complications  Comments: Ultrasound guidance was used for needle localization. Images   were saved and stored for documentation. The appropriate structures were   visualized. Dynamic visualization of the needle was continuous throughout   the procedures and " maintained good position.         Relevant imaging results were reviewed and interpreted by me and per my read shows mild arthritic changes.  This was discussed with the patient and / or family today.     Assessment:  Anel Schmitt is a 59 y.o. female following up for chronic right knee pain.   History, physical and radiographs are consistent with a likely diagnosis of OA.   Plan: steroid injection ineffective. Prior auth for gel injections. PT referral. Continue conservative management for pain.   Follow up for gel injections. All questions answered.      Primary osteoarthritis of both knees  -     Prior authorization Order  -     Ambulatory referral/consult to Physical/Occupational Therapy; Future; Expected date: 05/06/2024    Chronic pain of right knee  -     Prior authorization Order  -     Ambulatory referral/consult to Physical/Occupational Therapy; Future; Expected date: 05/06/2024       Today's visit is associated with current or anticipated ongoing medical care related to this patient's diagnosis of osteoarthritis.  Currently there is no cure of osteoarthritis and the patient will require regular follow up to manage symptoms and progression.  The patient is to return to the office within a minimum of 3-6 months to review symptoms and function at that time.   CPT code     MEDICAL NECESSITY FOR VISCOSUPPLEMENTATION: After thorough evaluation of the patient, I have determined that visco-supplementation is medically necessary. The patient has painful degenerative changes of the knee with failure of conservative treatments including lifestyle modifications and rehabilitation exercises.  Oral analgesis/NSAIDs have not adequately controlled symptoms and there is radiographic evidence of Kellgren Nick grade 2 or greater osteoarthritic changes, or in lack of radiographic evidence, there is arthroscopic or other evidence of chondrosis.      A copy of today's visit note has been sent to the referring  provider.     Electronically signed:  Harry Talbot MD, MPH  04/29/2024  9:33 AM

## 2024-04-29 NOTE — TELEPHONE ENCOUNTER
----- Message from Deb Monte sent at 4/29/2024  8:19 AM CDT -----  I processed the pa again for mounjaro.  It was being knocked out because her insurance card states Anel Geronimo and our system states Anel Johnson.  She needs to get it corrected so there won't be any further issues

## 2024-04-29 NOTE — TELEPHONE ENCOUNTER
----- Message from Elisabeth Napier sent at 4/29/2024  2:29 PM CDT -----  Regarding: concerns  Name of who is calling:   [facility] [place]      What is the request in detail: Requesting a call back in ref to they do not accept pt secondary insurance      Can the clinic reply by [facility]:no      What number to call back if not [facility]:[phone]

## 2024-04-29 NOTE — TELEPHONE ENCOUNTER
----- Message from Marie Erwin sent at 4/29/2024 10:51 AM CDT -----  Regarding: Prior auth needed  Patient states that she needs prior authorization for her Mounjaro. She also stated that she has been out of this medication for the past two weeks. Patient can be reached at [phone]. Thank you

## 2024-04-30 DIAGNOSIS — E88.810 METABOLIC SYNDROME: ICD-10-CM

## 2024-04-30 DIAGNOSIS — R73.03 PREDIABETES: ICD-10-CM

## 2024-04-30 RX ORDER — ATORVASTATIN CALCIUM 80 MG/1
80 TABLET, FILM COATED ORAL DAILY
Qty: 90 TABLET | Refills: 3 | Status: SHIPPED | OUTPATIENT
Start: 2024-04-30

## 2024-04-30 RX ORDER — TIRZEPATIDE 5 MG/.5ML
5 INJECTION, SOLUTION SUBCUTANEOUS
Qty: 1 PEN | Refills: 4 | Status: SHIPPED | OUTPATIENT
Start: 2024-04-30

## 2024-04-30 NOTE — TELEPHONE ENCOUNTER
No care due was identified.  [facility] Embedded Care Due Messages. Reference number: [id number].   4/30/2024 1:08:46 PM CDT

## 2024-04-30 NOTE — TELEPHONE ENCOUNTER
Refill Routing Note   Medication(s) are not appropriate for processing by [facility] for the following reason(s):        Required labs outdated    [facility] action(s):  Defer               Appointments  past 12m or future 3m with PCP    Date Provider   Last Visit   4/22/2024 Irene Christie MD   Next Visit   Visit date not found Irene Christie MD   ED visits in past 90 days: 0        Note composed:10:09 AM 04/30/2024

## 2024-04-30 NOTE — TELEPHONE ENCOUNTER
----- Message from Rolando Green sent at 4/30/2024 12:40 PM CDT -----  Contact: self  ..Type:  RX Refill Request    Who Called: .Anel Schmitt  Refill or New Rx:Refill   RX Name and Strength:tirzepatide (MOUNJARO) 5 mg/0.5 mL PnIj  How is the patient currently taking it? (ex. 1XDay):weekly   Is this a 30 day or 90 day RX:   Preferred Pharmacy with phone number:.  [facility] - [place], [place] - [address] [facility]  [address] [place] [id number]  Phone: [phone] Fax: [phone]  Local or Mail Order:local   Ordering Provider:[place]   Would the patient rather a call back or a response via [facility]? Call back   Best Call Back Number:.[phone]   Additional Information:

## 2024-05-21 ENCOUNTER — TELEPHONE (OUTPATIENT)
Dept: FAMILY MEDICINE | Facility: CLINIC | Age: 59
End: 2024-05-21
Payer: COMMERCIAL

## 2024-05-21 VITALS — SYSTOLIC BLOOD PRESSURE: 114 MMHG | DIASTOLIC BLOOD PRESSURE: 82 MMHG

## 2024-05-27 ENCOUNTER — OFFICE VISIT (OUTPATIENT)
Dept: ORTHOPEDICS | Facility: CLINIC | Age: 59
End: 2024-05-27
Payer: COMMERCIAL

## 2024-05-27 VITALS — WEIGHT: 215 LBS | HEIGHT: 71 IN | BODY MASS INDEX: 30.1 KG/M2

## 2024-05-27 DIAGNOSIS — M17.0 PRIMARY OSTEOARTHRITIS OF BOTH KNEES: Primary | ICD-10-CM

## 2024-05-27 DIAGNOSIS — G89.29 CHRONIC PAIN OF RIGHT KNEE: ICD-10-CM

## 2024-05-27 DIAGNOSIS — M25.561 CHRONIC PAIN OF RIGHT KNEE: ICD-10-CM

## 2024-05-27 PROCEDURE — 99999 PR PBB SHADOW E&M-EST. PATIENT-LVL IV: CPT | Mod: PBBFAC,,, | Performed by: STUDENT IN AN ORGANIZED HEALTH CARE EDUCATION/TRAINING PROGRAM

## 2024-05-27 PROCEDURE — 20611 DRAIN/INJ JOINT/BURSA W/US: CPT | Mod: RT,S$GLB,, | Performed by: STUDENT IN AN ORGANIZED HEALTH CARE EDUCATION/TRAINING PROGRAM

## 2024-05-27 PROCEDURE — 99499 UNLISTED E&M SERVICE: CPT | Mod: S$GLB,,, | Performed by: STUDENT IN AN ORGANIZED HEALTH CARE EDUCATION/TRAINING PROGRAM

## 2024-05-27 NOTE — PROCEDURES
Large Joint Aspiration/Injection: R knee    Date/Time: 5/27/2024 11:00 AM    Performed by: Harry Talbot MD  Authorized by: Harry Talbot MD    Consent Done?:  Yes (Verbal)  Indications:  Arthritis and pain  Site marked: the procedure site was marked    Timeout: prior to procedure the correct patient, procedure, and site was verified    Prep: patient was prepped and draped in usual sterile fashion    Local anesthetic:  Lidocaine 1% without epinephrine  Anesthetic total (ml):  2      Details:  Needle Size:  21 G  Ultrasonic Guidance for needle placement?: Yes    Images are saved and documented.  Approach:  Lateral (superior)  Location:  Knee  Site:  R knee  Medications:  48 mg hylan g-f 20 48 mg/6 mL  Patient tolerance:  Patient tolerated the procedure well with no immediate complications     Ultrasound guidance was used for needle localization. Images were saved and stored for documentation. The appropriate structures were visualized. Dynamic visualization of the needle was continuous throughout the procedures and maintained good position.

## 2024-05-27 NOTE — PROCEDURES
Sports Medicine US - Guidance for Needle Placement    Date/Time: 5/27/2024 11:00 AM    Performed by: Harry Talbot MD  Authorized by: Harry Talbot MD  Preparation: Patient was prepped and draped in the usual sterile fashion.  Local anesthesia used: no    Anesthesia:  Local anesthesia used: no    Sedation:  Patient sedated: no    Patient tolerance: patient tolerated the procedure well with no immediate complications  Comments: Ultrasound guidance was used for needle localization. Images were saved and stored for documentation. The appropriate structures were visualized. Dynamic visualization of the needle was continuous throughout the procedures and maintained good position.

## 2024-05-27 NOTE — PATIENT INSTRUCTIONS
Assessment:  Anel Schmitt is a 59 y.o. female   Chief Complaint   Patient presents with    Right Knee - Pain       Encounter Diagnoses   Name Primary?    Primary osteoarthritis of both knees Yes    Chronic pain of right knee         Plan:  Ultrasound guided gel injection - Synvisc ONE to the right knee  Today you received a gel injection. Unlike steroid injections, these gel injections are a lot thicker and can feel different at first.   It is normal to feel some soreness in the first few days because the gel is expanding that joint space.   It is also normal to experience some swelling in the first few days.   If you are feeling discomfort or having swelling, use ice and tylenol to control symptoms.   It is better to keep the knee joint moving so that the gel can get throughout the joint space.   Sometimes it can take a few weeks for the gel injection to start showing noticeable effects. This is normal.   These gel injections can be repeated every 6 months as needed.   Follow up as needed    Follow-up: as needed.    Thank you for choosing [facility] and Dr. Harry Talbot for your orthopedic & sports medicine care. It is our goal to provide you with exceptional care that will help keep you healthy, active, and get you back in the game.    Please do not hesitate to reach out to us via email, phone, or MyChart with any questions, concerns, or feedback.    If you felt that you received exemplary care today, please consider leaving us feedback on [employer] at:  [email or web address]    If you are experiencing pain/discomfort ,or have questions after 5pm and would like to be connected to the [facility] on-call team, please call this number and specify which Sports Medicine provider is treating you: [phone]

## 2024-06-26 ENCOUNTER — OFFICE VISIT (OUTPATIENT)
Dept: FAMILY MEDICINE | Facility: CLINIC | Age: 59
End: 2024-06-26
Payer: COMMERCIAL

## 2024-06-26 VITALS
HEIGHT: 71 IN | HEART RATE: 76 BPM | RESPIRATION RATE: 18 BRPM | DIASTOLIC BLOOD PRESSURE: 78 MMHG | WEIGHT: 215.13 LBS | SYSTOLIC BLOOD PRESSURE: 109 MMHG | TEMPERATURE: 98 F | OXYGEN SATURATION: 99 % | BODY MASS INDEX: 30.12 KG/M2

## 2024-06-26 DIAGNOSIS — R73.03 PREDIABETES: ICD-10-CM

## 2024-06-26 DIAGNOSIS — I10 ESSENTIAL HYPERTENSION: ICD-10-CM

## 2024-06-26 DIAGNOSIS — H92.03 OTALGIA OF BOTH EARS: ICD-10-CM

## 2024-06-26 DIAGNOSIS — E66.09 CLASS 1 OBESITY DUE TO EXCESS CALORIES WITH SERIOUS COMORBIDITY AND BODY MASS INDEX (BMI) OF 30.0 TO 30.9 IN ADULT: Primary | ICD-10-CM

## 2024-06-26 DIAGNOSIS — J30.1 SEASONAL ALLERGIC RHINITIS DUE TO POLLEN: ICD-10-CM

## 2024-06-26 DIAGNOSIS — J32.9 CHRONIC SINUSITIS, UNSPECIFIED LOCATION: ICD-10-CM

## 2024-06-26 PROCEDURE — 3078F DIAST BP <80 MM HG: CPT | Mod: CPTII,S$GLB,, | Performed by: STUDENT IN AN ORGANIZED HEALTH CARE EDUCATION/TRAINING PROGRAM

## 2024-06-26 PROCEDURE — 3044F HG A1C LEVEL LT 7.0%: CPT | Mod: CPTII,S$GLB,, | Performed by: STUDENT IN AN ORGANIZED HEALTH CARE EDUCATION/TRAINING PROGRAM

## 2024-06-26 PROCEDURE — 99214 OFFICE O/P EST MOD 30 MIN: CPT | Mod: S$GLB,,, | Performed by: STUDENT IN AN ORGANIZED HEALTH CARE EDUCATION/TRAINING PROGRAM

## 2024-06-26 PROCEDURE — G2211 COMPLEX E/M VISIT ADD ON: HCPCS | Mod: S$GLB,,, | Performed by: STUDENT IN AN ORGANIZED HEALTH CARE EDUCATION/TRAINING PROGRAM

## 2024-06-26 PROCEDURE — 99999 PR PBB SHADOW E&M-EST. PATIENT-LVL V: CPT | Mod: PBBFAC,,, | Performed by: STUDENT IN AN ORGANIZED HEALTH CARE EDUCATION/TRAINING PROGRAM

## 2024-06-26 PROCEDURE — 1160F RVW MEDS BY RX/DR IN RCRD: CPT | Mod: CPTII,S$GLB,, | Performed by: STUDENT IN AN ORGANIZED HEALTH CARE EDUCATION/TRAINING PROGRAM

## 2024-06-26 PROCEDURE — 3008F BODY MASS INDEX DOCD: CPT | Mod: CPTII,S$GLB,, | Performed by: STUDENT IN AN ORGANIZED HEALTH CARE EDUCATION/TRAINING PROGRAM

## 2024-06-26 PROCEDURE — 3074F SYST BP LT 130 MM HG: CPT | Mod: CPTII,S$GLB,, | Performed by: STUDENT IN AN ORGANIZED HEALTH CARE EDUCATION/TRAINING PROGRAM

## 2024-06-26 PROCEDURE — 1159F MED LIST DOCD IN RCRD: CPT | Mod: CPTII,S$GLB,, | Performed by: STUDENT IN AN ORGANIZED HEALTH CARE EDUCATION/TRAINING PROGRAM

## 2024-06-26 RX ORDER — LEVOCETIRIZINE DIHYDROCHLORIDE 5 MG/1
5 TABLET, FILM COATED ORAL NIGHTLY
Qty: 90 TABLET | Refills: 3 | Status: SHIPPED | OUTPATIENT
Start: 2024-06-26 | End: 2025-06-26

## 2024-06-26 RX ORDER — TOBRAMYCIN AND DEXAMETHASONE 3; 1 MG/ML; MG/ML
SUSPENSION/ DROPS OPHTHALMIC
COMMUNITY
Start: 2024-06-10

## 2024-06-26 RX ORDER — FLUOROMETHOLONE 1 MG/ML
SUSPENSION/ DROPS OPHTHALMIC
COMMUNITY
Start: 2024-06-24

## 2024-06-26 RX ORDER — AMLODIPINE BESYLATE 5 MG/1
5 TABLET ORAL DAILY
COMMUNITY

## 2024-06-26 NOTE — PROGRESS NOTES
Problem List Items Addressed This Visit          ENT    Sinusitis    Overview     Chronic hx, reports she has seen ENT: advised azelastine nasal spray with minimal relief  - cont prn nasal spray, H2 blocker  - routine follow up ENT         Relevant Medications    levocetirizine (XYZAL) 5 MG tablet    Other Relevant Orders    Ambulatory referral/consult to ENT       Cardiac/Vascular    Essential hypertension    Overview     -at goal today  No meds if bp <140/90   - Current Hypertension Medications:   Hypertension Medications               hydroCHLOROthiazide (HYDRODIURIL) 12.5 MG Tab Take 1 tablet (12.5 mg total) by mouth once daily.    amLODIPine (NORVASC) 5 MG tablet Take 5 mg by mouth once daily.          -continue lifestyle modification with low sodium diet and exercise   -discussed hypertension disease course and importance of treating high blood pressure  -patient understood and advised of risk of untreated blood pressure.  ER precautions were given   for symptoms of hypertensive urgency and emergency.           Relevant Orders    Microalbumin/Creatinine Ratio, Urine    Comprehensive Metabolic Panel    CBC Auto Differential       Endocrine    Prediabetes    Overview     Lab Results   Component Value Date    HGBA1C 5.5 04/18/2024     -current meds:   Diabetes Medications               tirzepatide 10 mg/0.5 mL PnIj Inject 10 mg into the skin every 7 days.            -discussed the importance of limiting sugary drinks (sodas, juices, sweet teas) and participating in regular daily exercise 30 min 3-5 days weekly.   3-6 m follow up             Relevant Orders    Microalbumin/Creatinine Ratio, Urine    Comprehensive Metabolic Panel    CBC Auto Differential    Class 1 obesity due to excess calories with serious comorbidity and body mass index (BMI) of 30.0 to 30.9 in adult - Primary    Overview     Wt Readings from Last 3 Encounters:   06/26/24 1531 97.6 kg (215 lb 1.6 oz)   05/27/24 1010 97.5 kg (215 lb)   04/29/24  1057 97.7 kg (215 lb 6.2 oz)     Increase to mounjaro 10     General weight loss/lifestyle modification strategies discussed: limit sugary drinks, exercise 3-5x per week  Informal exercise measures discussed, e.g. taking stairs instead of elevator.                 Relevant Medications    tirzepatide 10 mg/0.5 mL PnIj    Other Relevant Orders    Microalbumin/Creatinine Ratio, Urine    Comprehensive Metabolic Panel    CBC Auto Differential     Other Visit Diagnoses       Seasonal allergic rhinitis due to pollen        Relevant Medications    levocetirizine (XYZAL) 5 MG tablet    Otalgia of both ears        Relevant Orders    Ambulatory referral/consult to ENT          Visit today included increased complexity associated with the care of the episodic problem addressed and managing the longitudinal care of the patient due to the serious and/or complex managed problem(s) as per problem list.             Follow up in about 6 months (around 12/26/2024) for early sept mario appt .    Irene Christie MD  _________________________________________________________________________      Patient ID: Anel Schmitt is a 59 y.o. female.    Chief Complaint: follow up     Reports blood pressure has been running within the normal limits.  Sometimes blood pressure out of limits at 140/90s however this is seldom.  Reports intermittent use of amlodipine and hydrochlorothiazide if blood pressure greater than 140/90.    Reports chronic bilateral ear pain especially in the setting of allergic rhinitis.  Uses azelastine nasal spray daily with some relief.  Reports chronic sensation of ear fullness.  Also uses Coricidin cold and Sinus routinely which provides minimal relief for ear fullness sensation.  Reports no relief with Singulair.    Past medical histories reviewed, including past medical, surgical, family and social histories.      Current Outpatient Medications on File Prior to Visit   Medication Sig Dispense Refill     atorvastatin (LIPITOR) 80 MG tablet Take 1 tablet (80 mg total) by mouth once daily. 90 tablet 3    azelastine (ASTELIN) 137 mcg (0.1 %) nasal spray INHALE 1 SPRAY INTO EACH NOSTRIL 1 TIME PER DAY FOR 14 DAYS *THANK YOU* 30 mL 3    clotrimazole-betamethasone 1-0.05% (LOTRISONE) cream Apply topically 2 (two) times daily. 45 g 0    fluorometholone 0.1% (FML) 0.1 % DrpS Place into both eyes.      hydroCHLOROthiazide (HYDRODIURIL) 12.5 MG Tab Take 1 tablet (12.5 mg total) by mouth once daily. 90 tablet 3    LIDOcaine-prilocaine (EMLA) cream Apply topically as needed (leg pain). 30 g 11    linaCLOtide (LINZESS) 145 mcg Cap capsule Take 1 capsule (145 mcg total) by mouth before breakfast. 90 capsule 1    nystatin (MYCOSTATIN) powder Apply topically 2 (two) times daily. 60 g 11    ondansetron (ZOFRAN-ODT) 4 MG TbDL Take 2 tablets (8 mg total) by mouth 2 (two) times daily. 20 tablet 1    oxybutynin (DITROPAN-XL) 5 MG TR24 Take 1 tablet (5 mg total) by mouth once daily. 90 tablet 3    pantoprazole (PROTONIX) 40 MG tablet Take 1 tablet (40 mg total) by mouth once daily. 90 tablet 3    simethicone (MYLICON) 125 mg Cap capsule Take 1 capsule (125 mg total) by mouth 4 (four) times daily as needed for Flatulence. 90 capsule 2    tobramycin-dexAMETHasone 0.3-0.1% (TOBRADEX) 0.3-0.1 % DrpS Place into both eyes.      triamcinolone acetonide 0.1% (KENALOG) 0.1 % cream Apply topically 2 (two) times daily. 28.4 g 1    [DISCONTINUED] levocetirizine (XYZAL) 5 MG tablet Take 1 tablet (5 mg total) by mouth every evening. 90 tablet 3    [DISCONTINUED] montelukast (SINGULAIR) 10 mg tablet Take 1 tablet (10 mg total) by mouth once daily. 90 tablet 3    [DISCONTINUED] tirzepatide (MOUNJARO) 5 mg/0.5 mL PnIj Inject 5 mg into the skin every 7 days. 1 Pen 4    amLODIPine (NORVASC) 5 MG tablet Take 5 mg by mouth once daily.      estradioL (ESTRACE) 0.01 % (0.1 mg/gram) vaginal cream Place 3 g vaginally twice a week. 24 g 11    sumatriptan  (IMITREX) 50 MG tablet Take 1 tablet (50 mg total) by mouth once. for 1 dose 10 tablet 0    [DISCONTINUED] fluconazole (DIFLUCAN) 150 MG Tab Take 1 tablet (150 mg total) by mouth once daily. (Patient not taking: Reported on 6/26/2024) 8 tablet 0     No current facility-administered medications on file prior to visit.       Review of Systems   12 point review of systems negative except for listed in HPI.     Objective:    Nursing note and vitals reviewed.  Vitals:    06/26/24 1531   BP: 109/78   Pulse: 76   Resp: 18   Temp: 97.8 °F (36.6 °C)     Body mass index is 30 kg/m².     Physical Exam   Constitutional: oriented to person, place, and time. well-developed and well-nourished. No distress.   HENT: WNL  Head: Normocephalic and atraumatic.   Eyes: EOM are normal.   Neck: Normal range of motion. Neck supple.   Cardiovascular: Normal rate  Pulmonary/Chest: Effort normal. No respiratory distress.   Musculoskeletal: Normal range of motion. no edema.   Neurological: CN II-XII intact  Skin: warm and dry.   Psychiatric: normal mood and affect. behavior is normal.           We Offer Telehealth & Same Day Appointments!   Book your Telehealth appointment with me through my nurse or   Clinic appointments on [facility]!  [phone]     To Schedule appointments online, go to [employer]: [email or web address]

## 2024-06-27 ENCOUNTER — TELEPHONE (OUTPATIENT)
Dept: FAMILY MEDICINE | Facility: CLINIC | Age: 59
End: 2024-06-27
Payer: COMMERCIAL

## 2024-06-27 NOTE — TELEPHONE ENCOUNTER
----- Message from Betito Salguero sent at 6/27/2024  1:54 PM CDT -----  Type:  Patient Returning Call    Who Called:NICOLETTE  with [facility] GP exchange  Who Left Message for Patient:  Does the patient know what this is regarding?:MOUNJARO HAS BEEN DENIED  Would the patient rather a call back or a response via [facility]? Call   Best Call Back Number:[phone]  Additional Information: [id number]

## 2024-08-02 ENCOUNTER — TELEPHONE (OUTPATIENT)
Dept: FAMILY MEDICINE | Facility: CLINIC | Age: 59
End: 2024-08-02
Payer: COMMERCIAL

## 2024-08-02 DIAGNOSIS — Z12.31 SCREENING MAMMOGRAM FOR BREAST CANCER: Primary | ICD-10-CM

## 2024-08-02 NOTE — TELEPHONE ENCOUNTER
----- Message from Diamone Speed sent at 8/2/2024 12:11 PM CDT -----  Regarding: self  Type:  Mammogram         Caller is requesting to schedule their annual mammogram appointment.  Order is not listed in EPIC.  Please enter order and contact patient to schedule.  Name of Caller:       Where would they like the mammogram performed? [facility]       Would the patient rather a call back or a response via [facility]? Call jj       Best Call Back Number: [phone]                      Would the patient rather a call back or a response via [facility]?      Best Call Back Number:      Additional Information:

## 2024-08-02 NOTE — TELEPHONE ENCOUNTER
08/02/2024 12:17 PM   Mammo order placed 4/22/24, appt scheduled with pt for 8/12/24.   Quality 226: Preventive Care And Screening: Tobacco Use: Screening And Cessation Intervention: Patient screened for tobacco use and is an ex/non-smoker Detail Level: Detailed

## 2024-08-07 DIAGNOSIS — E88.810 METABOLIC SYNDROME: ICD-10-CM

## 2024-08-07 RX ORDER — TIRZEPATIDE 7.5 MG/.5ML
INJECTION, SOLUTION SUBCUTANEOUS
Refills: 5 | OUTPATIENT
Start: 2024-08-07

## 2024-08-09 ENCOUNTER — OFFICE VISIT (OUTPATIENT)
Dept: FAMILY MEDICINE | Facility: CLINIC | Age: 59
End: 2024-08-09
Attending: FAMILY MEDICINE
Payer: COMMERCIAL

## 2024-08-09 VITALS
WEIGHT: 215 LBS | DIASTOLIC BLOOD PRESSURE: 80 MMHG | HEIGHT: 71 IN | BODY MASS INDEX: 30.1 KG/M2 | SYSTOLIC BLOOD PRESSURE: 110 MMHG | HEART RATE: 75 BPM

## 2024-08-09 DIAGNOSIS — J06.9 URI, ACUTE: Primary | ICD-10-CM

## 2024-08-09 DIAGNOSIS — I10 ESSENTIAL HYPERTENSION: ICD-10-CM

## 2024-08-09 DIAGNOSIS — J30.1 SEASONAL ALLERGIC RHINITIS DUE TO POLLEN: ICD-10-CM

## 2024-08-09 RX ORDER — PROMETHAZINE HYDROCHLORIDE AND DEXTROMETHORPHAN HYDROBROMIDE 6.25; 15 MG/5ML; MG/5ML
5 SYRUP ORAL NIGHTLY PRN
Qty: 180 ML | Refills: 0 | Status: SHIPPED | OUTPATIENT
Start: 2024-08-09 | End: 2024-08-19

## 2024-08-09 RX ORDER — AZELASTINE 1 MG/ML
1 SPRAY, METERED NASAL 2 TIMES DAILY
Qty: 30 ML | Refills: 3 | Status: SHIPPED | OUTPATIENT
Start: 2024-08-09

## 2024-08-09 RX ORDER — METHYLPREDNISOLONE 4 MG/1
TABLET ORAL
Qty: 1 EACH | Refills: 0 | Status: SHIPPED | OUTPATIENT
Start: 2024-08-09 | End: 2024-08-30

## 2024-08-09 RX ORDER — LEVOCETIRIZINE DIHYDROCHLORIDE 5 MG/1
5 TABLET, FILM COATED ORAL NIGHTLY
Qty: 90 TABLET | Refills: 3 | Status: SHIPPED | OUTPATIENT
Start: 2024-08-09 | End: 2025-08-09

## 2024-08-12 ENCOUNTER — TELEPHONE (OUTPATIENT)
Dept: FAMILY MEDICINE | Facility: CLINIC | Age: 59
End: 2024-08-12
Payer: COMMERCIAL

## 2024-08-21 ENCOUNTER — HOSPITAL ENCOUNTER (OUTPATIENT)
Dept: RADIOLOGY | Facility: HOSPITAL | Age: 59
Discharge: HOME | End: 2024-08-21
Attending: STUDENT IN AN ORGANIZED HEALTH CARE EDUCATION/TRAINING PROGRAM
Payer: COMMERCIAL

## 2024-08-21 ENCOUNTER — OFFICE VISIT (OUTPATIENT)
Dept: FAMILY MEDICINE | Facility: CLINIC | Age: 59
End: 2024-08-21
Payer: COMMERCIAL

## 2024-08-21 VITALS
BODY MASS INDEX: 30 KG/M2 | TEMPERATURE: 98 F | RESPIRATION RATE: 18 BRPM | OXYGEN SATURATION: 97 % | SYSTOLIC BLOOD PRESSURE: 128 MMHG | WEIGHT: 214.31 LBS | HEART RATE: 81 BPM | HEIGHT: 71 IN | DIASTOLIC BLOOD PRESSURE: 82 MMHG

## 2024-08-21 DIAGNOSIS — R09.82 POST-NASAL DRIP: Primary | ICD-10-CM

## 2024-08-21 DIAGNOSIS — I10 ESSENTIAL HYPERTENSION: ICD-10-CM

## 2024-08-21 DIAGNOSIS — Z12.31 ENCOUNTER FOR SCREENING MAMMOGRAM FOR MALIGNANT NEOPLASM OF BREAST: ICD-10-CM

## 2024-08-21 DIAGNOSIS — E66.09 CLASS 1 OBESITY DUE TO EXCESS CALORIES WITH SERIOUS COMORBIDITY AND BODY MASS INDEX (BMI) OF 30.0 TO 30.9 IN ADULT: ICD-10-CM

## 2024-08-21 DIAGNOSIS — J40 BRONCHITIS: ICD-10-CM

## 2024-08-21 LAB
CTP QC/QA: YES
SARS-COV-2 RDRP RESP QL NAA+PROBE: NEGATIVE

## 2024-08-21 PROCEDURE — 77067 SCR MAMMO BI INCL CAD: CPT | Mod: TC,PO

## 2024-08-21 PROCEDURE — 3079F DIAST BP 80-89 MM HG: CPT | Mod: CPTII,S$GLB,, | Performed by: STUDENT IN AN ORGANIZED HEALTH CARE EDUCATION/TRAINING PROGRAM

## 2024-08-21 PROCEDURE — 99999 PR PBB SHADOW E&M-EST. PATIENT-LVL V: CPT | Mod: PBBFAC,,, | Performed by: STUDENT IN AN ORGANIZED HEALTH CARE EDUCATION/TRAINING PROGRAM

## 2024-08-21 PROCEDURE — 77063 BREAST TOMOSYNTHESIS BI: CPT | Mod: 26,,, | Performed by: RADIOLOGY

## 2024-08-21 PROCEDURE — 3008F BODY MASS INDEX DOCD: CPT | Mod: CPTII,S$GLB,, | Performed by: STUDENT IN AN ORGANIZED HEALTH CARE EDUCATION/TRAINING PROGRAM

## 2024-08-21 PROCEDURE — 1160F RVW MEDS BY RX/DR IN RCRD: CPT | Mod: CPTII,S$GLB,, | Performed by: STUDENT IN AN ORGANIZED HEALTH CARE EDUCATION/TRAINING PROGRAM

## 2024-08-21 PROCEDURE — 87635 SARS-COV-2 COVID-19 AMP PRB: CPT | Mod: QW,S$GLB,, | Performed by: STUDENT IN AN ORGANIZED HEALTH CARE EDUCATION/TRAINING PROGRAM

## 2024-08-21 PROCEDURE — 1159F MED LIST DOCD IN RCRD: CPT | Mod: CPTII,S$GLB,, | Performed by: STUDENT IN AN ORGANIZED HEALTH CARE EDUCATION/TRAINING PROGRAM

## 2024-08-21 PROCEDURE — 77067 SCR MAMMO BI INCL CAD: CPT | Mod: 26,,, | Performed by: RADIOLOGY

## 2024-08-21 PROCEDURE — 3044F HG A1C LEVEL LT 7.0%: CPT | Mod: CPTII,S$GLB,, | Performed by: STUDENT IN AN ORGANIZED HEALTH CARE EDUCATION/TRAINING PROGRAM

## 2024-08-21 PROCEDURE — 3074F SYST BP LT 130 MM HG: CPT | Mod: CPTII,S$GLB,, | Performed by: STUDENT IN AN ORGANIZED HEALTH CARE EDUCATION/TRAINING PROGRAM

## 2024-08-21 PROCEDURE — 99214 OFFICE O/P EST MOD 30 MIN: CPT | Mod: S$GLB,,, | Performed by: STUDENT IN AN ORGANIZED HEALTH CARE EDUCATION/TRAINING PROGRAM

## 2024-08-21 PROCEDURE — G2211 COMPLEX E/M VISIT ADD ON: HCPCS | Mod: S$GLB,,, | Performed by: STUDENT IN AN ORGANIZED HEALTH CARE EDUCATION/TRAINING PROGRAM

## 2024-08-21 RX ORDER — METHYLPREDNISOLONE 4 MG/1
TABLET ORAL
Qty: 1 EACH | Refills: 0 | Status: SHIPPED | OUTPATIENT
Start: 2024-08-21 | End: 2024-09-11

## 2024-08-21 RX ORDER — AZITHROMYCIN 250 MG/1
TABLET, FILM COATED ORAL
Qty: 6 TABLET | Refills: 0 | Status: SHIPPED | OUTPATIENT
Start: 2024-08-21

## 2024-08-21 RX ORDER — AMLODIPINE BESYLATE 5 MG/1
5 TABLET ORAL DAILY
Qty: 90 TABLET | Refills: 5 | Status: SHIPPED | OUTPATIENT
Start: 2024-08-21

## 2024-08-21 RX ORDER — LEVALBUTEROL INHALATION SOLUTION 1.25 MG/3ML
1 SOLUTION RESPIRATORY (INHALATION) EVERY 6 HOURS PRN
Qty: 3 ML | Refills: 11 | Status: SHIPPED | OUTPATIENT
Start: 2024-08-21 | End: 2025-08-21

## 2024-08-21 NOTE — PROGRESS NOTES
"History of Present Illness    CHIEF COMPLAINT:  Patient presents today for persistent cough and postnasal drip.    RESPIRATORY SYMPTOMS:  She reports ongoing respiratory symptoms for the past two weeks, including a cough sometimes producing clear or white phlegm, and postnasal drip causing throat tingling and triggering coughing. She denies current fever or chills but recalls having fever, chills, and a sore throat 3-4 weeks ago, which have since resolved. She denies known COVID exposure. She describes her cough as sounding "rough" with "a lot of cold in it" and expresses a desire to "get this cold out."    WEIGHT MANAGEMENT:  She reports losing one pound and expresses a goal to get under 200 lbs. Her exercise routine includes kickboxing.     ROS:  Constitutional: -fevers, -chills, +weight loss  ENT: -sore throat, +post nasal drip  Respiratory: +cough          OBJECTIVE:  Vitals:    08/21/24 0908   BP: 128/82   Pulse: 81   Resp: 18   Temp: 97.6 °F (36.4 °C)     Constitutional:  No acute distress  HEENT:  Head normocephalic and atraumatic. PERRL, EOMI. No scleral icterus or erythema.   R ear: The pinna and ear canal wnl. TM wnl, non bulging, no fluid  L ear: The pinna and ear canal wnl. TM wnl, non bulging, no fluid  Pharynx moist, no exudate, mildly erythematous   Neck: Normal range of motion. Neck supple. No LAD  Cardiovascular: Normal rate  Pulmonary/Chest: Effort normal. No respiratory distress. CTAB, no wheezing  Musculoskeletal: Normal range of motion.  Neurological: CN II-XII intact  Skin: warm and dry.   Psychiatric: normal mood and affect. behavior is normal. .    ASSESSMENT:   viral upper respiratory illness, allergic rhinitis, and bronchitis    PLAN:  Symptomatic therapy suggested: push fluids, rest, and return office visit prn if symptoms persist or worsen.   Call or return to clinic prn if these symptoms worsen or fail to improve as anticipated.      Visit today included increased complexity associated " with the care of the episodic problem addressed and managing the longitudinal care of the patient due to the serious and/or complex managed problem(s) as per problem list.   Problem List Items Addressed This Visit          Cardiac/Vascular    Essential hypertension    Overview     -at goal today  No meds if bp <140/90   - Current Hypertension Medications:   Hypertension Medications               hydroCHLOROthiazide (HYDRODIURIL) 12.5 MG Tab Take 1 tablet (12.5 mg total) by mouth once daily.    amLODIPine (NORVASC) 5 MG tablet Take 5 mg by mouth once daily.          -continue lifestyle modification with low sodium diet and exercise   -discussed hypertension disease course and importance of treating high blood pressure  -patient understood and advised of risk of untreated blood pressure.  ER precautions were given   for symptoms of hypertensive urgency and emergency.           Relevant Medications    amLODIPine (NORVASC) 5 MG tablet       Endocrine    Class 1 obesity due to excess calories with serious comorbidity and body mass index (BMI) of 30.0 to 30.9 in adult    Overview     Wt Readings from Last 3 Encounters:   08/21/24 0908 97.2 kg (214 lb 4.8 oz)   08/09/24 0846 97.5 kg (215 lb)   06/26/24 1531 97.6 kg (215 lb 1.6 oz)     mounjaro 10     General weight loss/lifestyle modification strategies discussed: limit sugary drinks, exercise 3-5x per week  Informal exercise measures discussed, e.g. taking stairs instead of elevator.                 Relevant Medications    tirzepatide 10 mg/0.5 mL PnIj     Other Visit Diagnoses       Post-nasal drip    -  Primary    Relevant Medications    methylPREDNISolone (MEDROL DOSEPACK) 4 mg tablet    Other Relevant Orders    POCT COVID-19 Rapid Screening (Completed)    Bronchitis        Relevant Medications    azithromycin (Z-JENNIFER) 250 MG tablet    levalbuterol (XOPENEX) 1.25 mg/3 mL nebulizer solution    Other Relevant Orders    NEBULIZER KIT (SUPPLIES) FOR HOME USE

## 2024-08-26 ENCOUNTER — TELEPHONE (OUTPATIENT)
Dept: FAMILY MEDICINE | Facility: CLINIC | Age: 59
End: 2024-08-26
Payer: COMMERCIAL

## 2024-08-26 DIAGNOSIS — E66.09 CLASS 1 OBESITY DUE TO EXCESS CALORIES WITH SERIOUS COMORBIDITY AND BODY MASS INDEX (BMI) OF 30.0 TO 30.9 IN ADULT: ICD-10-CM

## 2024-08-26 NOTE — TELEPHONE ENCOUNTER
Please assist with PA   tirzepatide 10 mg/0.5 mL PnIj         Diagnoses  Class 1 obesity due to excess calories with serious comorbidity and body mass index (BMI) of 30.0 to 30.9 in adult [E66.09, Z68.30]

## 2024-08-26 NOTE — TELEPHONE ENCOUNTER
----- Message from Dhara Hogue sent at 8/26/2024  9:58 AM CDT -----  Contact: Anel Tam is requesting a call back in regards to needing a prior authorization sent in for her monjuaro. Please give her a call back at [phone]

## 2024-08-26 NOTE — TELEPHONE ENCOUNTER
Mounjaro reordered  Medications Ordered This Encounter   Medications    tirzepatide 10 mg/0.5 mL PnIj     Sig: Inject 10 mg into the skin every 7 days.     Dispense:  1 Pen     Refill:  11

## 2024-08-26 NOTE — TELEPHONE ENCOUNTER
----- Message from Enrique De La Vega sent at 8/26/2024  3:38 PM CDT -----  Type:  RX Refill Request    Who Called:   Refill or New Rx:  RX Name and Strength:tirzepatide 10 mg/0.5 mL  How is the patient currently taking it? (ex. 1XDay):  Is this a 30 day or 90 day RX:  Preferred Pharmacy with phone number:No Pharmacies Listed     Local or Mail Order:  Ordering Provider:  Would the patient rather a call back or a response via [facility]?   Best Call Back Number:[phone]  Additional Information: Patient need a Prior Authorization

## 2024-08-26 NOTE — TELEPHONE ENCOUNTER
Please assist with PA   Shy      Diagnoses  Class 1 obesity due to excess calories with serious comorbidity and body mass index (BMI) of 30.0 to 30.9 in adult [E66.09, Z68.30]

## 2024-08-28 ENCOUNTER — TELEPHONE (OUTPATIENT)
Dept: FAMILY MEDICINE | Facility: CLINIC | Age: 59
End: 2024-08-28
Payer: COMMERCIAL

## 2024-08-28 DIAGNOSIS — R73.03 PREDIABETES: Primary | ICD-10-CM

## 2024-08-28 NOTE — TELEPHONE ENCOUNTER
"I spoke with the patient about this. Pt informed Found prior authorization for another prescription for the same medication: Waiting for Payer Response     tirzepatide 10 mg/0.5 mL PnIj       Pt reports Dr. Christie spoke with her regarding changing the medication if not approved. Pt is requesting this be done now. Pt reports "I have diabetes, I do not know why this wouldn't be covered." Diagnosis attached to medication was obesity. Pt states she is almost out of this medication. Please advise   "

## 2024-08-28 NOTE — TELEPHONE ENCOUNTER
----- Message from Lillian Moncada sent at 8/28/2024  2:19 PM CDT -----  Contact: pt  Pt is calling in rgd to her med, tirzepatide 10 mg/0.5 mL PnIj please call her back about the status of this med refill.  Pharmacy need PA  Pt sates she has called two days ago and no one has called her back        [facility] [address] [place] [address]  Phone: [phone] Fax: [phone]

## 2024-08-28 NOTE — TELEPHONE ENCOUNTER
"Resent with dx of "prediabetes"   Lab Results   Component Value Date    HGBA1C 5.5 04/18/2024     No dx of diabetes in chart      No orders of the defined types were placed in this encounter.      Medications Ordered This Encounter   Medications    tirzepatide 10 mg/0.5 mL PnIj     Sig: Inject 10 mg into the skin every 7 days.     Dispense:  1 Pen     Refill:  11     "

## 2024-08-29 ENCOUNTER — TELEPHONE (OUTPATIENT)
Dept: FAMILY MEDICINE | Facility: CLINIC | Age: 59
End: 2024-08-29
Payer: COMMERCIAL

## 2024-08-29 DIAGNOSIS — R73.03 PREDIABETES: Primary | ICD-10-CM

## 2024-08-29 RX ORDER — SEMAGLUTIDE 1 MG/.5ML
1 INJECTION, SOLUTION SUBCUTANEOUS
Qty: 3 ML | Refills: 4 | Status: SHIPPED | OUTPATIENT
Start: 2024-08-29

## 2024-08-29 NOTE — TELEPHONE ENCOUNTER
I spoke with the patient about this. Pt informed we are awaiting response from her insurance regarding approval or denial. Pt advised to reach out to insurance regarding this.     Deb, can you please check the status of this PA

## 2024-08-29 NOTE — TELEPHONE ENCOUNTER
----- Message from Deb Monte sent at 8/29/2024  1:22 PM CDT -----  Did pa on pt and got the following message:    No eligibility was found. Please reconfirm the member's health plan coverage before re-submitting.

## 2024-08-29 NOTE — TELEPHONE ENCOUNTER
----- Message from Michelle Mary sent at 8/29/2024  1:44 PM CDT -----  Regarding: Alternative medication requested  Contact: [phone]  Hi, pt called to say her tirzepatide 10 mg/0.5 mL PnIj was denied and she will need an Alternative to the medication. Pls call the pt at [phone].    Thank you.

## 2024-08-29 NOTE — TELEPHONE ENCOUNTER
[1:22 PM] Deb Monte  No eligibility was found. Please reconfirm the member's health plan coverage before re-submitting.

## 2024-08-29 NOTE — TELEPHONE ENCOUNTER
No orders of the defined types were placed in this encounter.      Medications Ordered This Encounter   Medications    semaglutide, weight loss, (WEGOVY) 1 mg/0.5 mL PnIj     Sig: Inject 1 mg into the skin every 7 days.     Dispense:  3 mL     Refill:  4

## 2024-08-30 ENCOUNTER — TELEPHONE (OUTPATIENT)
Dept: FAMILY MEDICINE | Facility: CLINIC | Age: 59
End: 2024-08-30
Payer: COMMERCIAL

## 2024-08-30 DIAGNOSIS — R73.03 PREDIABETES: Primary | ICD-10-CM

## 2024-08-30 NOTE — TELEPHONE ENCOUNTER
----- Message from Deb Monte sent at 8/30/2024 10:46 AM CDT -----  Pa for mounjaro was denied and a letter was sent to our office

## 2024-08-30 NOTE — TELEPHONE ENCOUNTER
----- Message from Enrique De La Vega sent at 8/30/2024  3:33 PM CDT -----    Type:  Needs Medical Advice    Who Called: blanka   Symptoms (please be specific):    How long has patient had these symptoms:    Pharmacy name and phone #:    Would the patient rather a call back or a response via [facility]?   Best Call Back Number:  [phone]  Additional Information:     PATIENT CALLED REGARDING MEDICATION . PATIENT WILL LIKE A CALL REGARDING THIS MATTER

## 2024-08-30 NOTE — TELEPHONE ENCOUNTER
semaglutide, weight loss, (WEGOVY) 1 mg/0.5 mL PnIj      Please assist with PA     Diagnoses  Prediabetes [R73.03]

## 2024-08-30 NOTE — TELEPHONE ENCOUNTER
08/30/2024 10:39 AM   Unable to reach pt via phone. Left VM stating the PA is awaiting payer response.

## 2024-08-30 NOTE — TELEPHONE ENCOUNTER
----- Message from Juanjo Currie sent at 8/30/2024  3:20 PM CDT -----  Regarding: Pt Advice  Contact: [phone]  [facility] calling stating Wegovy medication was denied. Case # [id number]. Please call [phone]

## 2024-08-30 NOTE — TELEPHONE ENCOUNTER
----- Message from Deb Monte sent at 8/30/2024  2:27 PM CDT -----  Tried to do a pa on wegovy.  Was given the following message    Your PA Request has been closed. The  is not the PA processor for the drug requested. For further inquiries please contact the number on the back of the member's prescription card. (Message [phone]). 08/30/2024 02:09 PM [id number]    I called the number and got sent to [employer] for approval.  They could not find her in the system.  I gave them all of patients info including the different last names since insurance and what we have on file are different and they still couldn't find her

## 2024-08-30 NOTE — TELEPHONE ENCOUNTER
I spoke with the patient about this. Pt reports she reached out to her insurance and they are requesting Ozempic. Please review and sign pended orders if you agree

## 2024-08-30 NOTE — TELEPHONE ENCOUNTER
----- Message from Lillain Moncada sent at 8/30/2024  2:47 PM CDT -----  Contact: pt  Type:  RX Refill Request    Who Called:  pt  Refill or New Rx: refill   RX Name and Strength: Ozempic (can't locate in chart)  How is the patient currently taking it? (ex. 1XDay):  Is this a 30 day or 90 day RX:  Preferred Pharmacy with phone number:  Local or Mail Order: local  Ordering Provider:   Would the patient rather a call back or a response via [facility]?  phone  Best Call Back Number: [phone]  Additional Information: pt states that the ins want her to try sending the Ozempiz, please call pt (not quit understanding want she needs)

## 2024-08-30 NOTE — TELEPHONE ENCOUNTER
----- Message from Giuliana Hahn sent at 8/30/2024  1:29 PM CDT -----  Contact: pt @[phone]  KIRBY STOCKTON calling regarding Patient Advice (message) for #pt is calling to see if doctor put in another medication for her asking for call back

## 2024-08-30 NOTE — TELEPHONE ENCOUNTER
Irene Christie MD   to Royal Bonilla Staff       8/29/24  1:57 PM   Unsure why your insurance is no longer covering med.    Unfortunately, insurance companies have stopped covering the glp1s: semaglutide, ozempic, wegovy, zepbound, mounjaro, etc without a diagnosis of type 2 diabetes.    You can try calling your insurance company to ask which medications are covered under weightloss/obesity/overweight.    Dr. Christie      08/30/2024 1:44 PM   Pt stated the pharmacy told her Wegovy was also denied today. She asked if we could send in another medication besides Mounjaro and Wegovy. I advised her of Dr. Christie's advice above. She agreed to call her insurance company and let us know.

## 2024-08-30 NOTE — TELEPHONE ENCOUNTER
----- Message from Bonnie Mendez sent at 8/30/2024 10:34 AM CDT -----  Regarding: Prior Auth  Contact: Anel  Regarding: prior auth        Name Of Caller: Anel Olivas Schmitt          Contact Preference:  Contact Information  [phone] (Mobile)          Nature of call:  pt is calling to have a prior auth for the following medication.        semaglutide, weight loss, (WEGOVY) 1 mg/0.5 mL PnIj         Pharmacy:       [facility] - [place] [address] [place] [address] [address] [place] [address]  Phone: [phone] Fax: [phone]

## 2024-09-03 DIAGNOSIS — E66.09 CLASS 1 OBESITY DUE TO EXCESS CALORIES WITH SERIOUS COMORBIDITY AND BODY MASS INDEX (BMI) OF 30.0 TO 30.9 IN ADULT: ICD-10-CM

## 2024-09-03 DIAGNOSIS — R73.03 PREDIABETES: ICD-10-CM

## 2024-09-03 RX ORDER — SEMAGLUTIDE 0.68 MG/ML
0.5 INJECTION, SOLUTION SUBCUTANEOUS
Qty: 3 ML | Refills: 11 | Status: SHIPPED | OUTPATIENT
Start: 2024-09-03 | End: 2024-09-05 | Stop reason: SDUPTHER

## 2024-09-03 NOTE — TELEPHONE ENCOUNTER
----- Message from Anyi Miguel MA sent at 9/3/2024 10:27 AM CDT -----  Contact: [phone]  Gary(PA department) called                  Ms Casihi called to let provider know that the reason why medications were canceled is because it needs to be submitted on a mandated form.Also pt would like a call back from staff asap.

## 2024-09-03 NOTE — TELEPHONE ENCOUNTER
----- Message from Bonnie Mendez sent at 9/3/2024  8:30 AM CDT -----  Regarding: Rx refill  Contact: Anel  Regarding: Rx refill        Name Of Caller: Anel Olivas Schmitt          Contact Preference: [phone] (Mobile)     Nature of call:  pt is calling to have the following medication refilled, states she need a prior auth, states  she need to know what is approved if this one isn't . Requesting a call back.     semaglutide, weight loss, (WEGOVY) 1 mg/0.5 mL PnIj      Pharmacy:   ]    [facility] [address] [place] [address] - [address] [place] [address]  [address] [facility] [address]  Phone: [phone] Fax: [phone]

## 2024-09-04 NOTE — TELEPHONE ENCOUNTER
----- Message from Chula Baez sent at 9/4/2024 12:40 PM CDT -----  Contact: Anel Tam is needing an update on her semaglutide, weight loss, (WEGOVY) 1 mg/0.5 mL PnIj. Please call back [phone]. Pt is requesting the injection and not the tablet.    Thank you summer

## 2024-09-04 NOTE — TELEPHONE ENCOUNTER
Pt requesting status of PA. Please let me know once you receive approval or denial so we can update the patient.

## 2024-09-04 NOTE — TELEPHONE ENCOUNTER
----- Message from Kodi Moss sent at 9/4/2024  1:16 PM CDT -----  Please refill the medication(s) listed below.Please call the patient when the prescription(s) is ready for  at this phone number        Medication #1 MOUNJARO   Medication #2  Medication #3      Preferred Pharmacy:  [facility] [address] [address]  Phone: [phone] Fax: [phone]              Would the patient rather a call back or a response via [facility]? CALL    Best Call Back Number:Telephone Information:  Mobile          [phone]        Additional Information: Pt requesting refill, Please call pt to discuss further. Thank you

## 2024-09-04 NOTE — TELEPHONE ENCOUNTER
09/04/2024 1:26 PM   Pt stated she would like Mounjaro sent to [facility], for her to pay cash for. She would not like for the PA in process for Ozempic to be stopped. She would just like to pay for the Mounjaro while she waits.

## 2024-09-04 NOTE — TELEPHONE ENCOUNTER
Care Due:                  Date            Visit Type   Department     Provider  --------------------------------------------------------------------------------                                EP -                              PRIMARY      [facility] FAMILY  Last Visit: 08-      CARE ([facility])   ROSIBEL ESQUIVEL [facility]  Next Visit: 10-      CARE ([facility])   [facility]       Irene ESQUIVEL [facility]  Test          Frequency    Reason                     Performed    Due Date  --------------------------------------------------------------------------------    CMP.........  12 months..  atorvastatin,              04- 04-                             hydroCHLOROthiazide......    HBA1C.......  6 months...  semaglutide,               04-   10-                             semaglutide,, tirzepatide    Lipid Panel.  12 months..  atorvastatin.............  04- 04-    [facility] Embedded Care Due Messages. Reference number: [id number].   9/04/2024 1:30:33 PM CDT

## 2024-09-04 NOTE — TELEPHONE ENCOUNTER
----- Message from Chula Baez sent at 9/4/2024  9:40 AM CDT -----  Contact: Anel  Regarding: Rx refill  Contact: Anel  Regarding: Rx refill  Name Of Caller: Anel Olivas Schmitt  Contact Preference: [phone] (Mobile)   Nature of call:  pt is calling to have the following medication refilled, states she need a prior auth, states  she need to know what is approved if this one isn't . Requesting a call back.   semaglutide, weight loss, (WEGOVY) 1 mg/0.5 mL PnIj  Pharmacy: [facility] [address] [place] [address]  Phone: [phone] Fax: [phone]     Pt is calling to see if her medication has been approved. Pt is waiting on call back and she has been waiting on this medication for a while.

## 2024-09-05 ENCOUNTER — TELEPHONE (OUTPATIENT)
Dept: FAMILY MEDICINE | Facility: CLINIC | Age: 59
End: 2024-09-05
Payer: COMMERCIAL

## 2024-09-05 DIAGNOSIS — R73.03 PREDIABETES: ICD-10-CM

## 2024-09-05 RX ORDER — SEMAGLUTIDE 0.68 MG/ML
0.5 INJECTION, SOLUTION SUBCUTANEOUS
Qty: 3 ML | Refills: 0 | Status: SHIPPED | OUTPATIENT
Start: 2024-09-05 | End: 2024-10-05

## 2024-09-05 NOTE — TELEPHONE ENCOUNTER
No orders of the defined types were placed in this encounter.      Medications Ordered This Encounter   Medications    semaglutide (OZEMPIC) 0.25 mg or 0.5 mg (2 mg/3 mL) pen injector     Sig: Inject 0.5 mg into the skin every 7 days.     Dispense:  3 mL     Refill:  0     Okay to compound

## 2024-09-05 NOTE — TELEPHONE ENCOUNTER
----- Message from Razia Rader sent at 9/5/2024  3:44 PM CDT -----  Contact: Anel [phone]  Would like to receive medical advice.    Would they like a call back or a response via [facility]:  Call back    Additional information:  Anel is calling to speak to the provider or staff member on behalf a medication she has been calling for 3 weeks now to get refill but has not heard back from the provider or staff or even the pharmacy. Anel states she would like to discuss with the provider or staff on behalf of this and see if it is possible to get the RX. Please call Anel back for advice

## 2024-09-05 NOTE — TELEPHONE ENCOUNTER
I spoke with the patient about this. Pt reports her insurance is not coving any GLP1's. She is requesting  compounded semaglutide to be set to [facility] in [place]. Please review and sign if you agree

## 2024-09-17 ENCOUNTER — TELEPHONE (OUTPATIENT)
Dept: FAMILY MEDICINE | Facility: CLINIC | Age: 59
End: 2024-09-17
Payer: COMMERCIAL

## 2024-09-17 NOTE — TELEPHONE ENCOUNTER
----- Message from Kodi Moss sent at 9/17/2024  9:57 AM CDT -----  Type:  Patient Returning Call    Who Called:PT  Who Left Message for Patient:ENMANUEL  Does the patient know what this is regarding?:Pt returning missed call  Would the patient rather a call back or a response via [facility]? CALL  Best Call Back Number:Telephone Information:  Mobile          [phone]      Additional Information: Please advise thank you

## 2024-09-17 NOTE — TELEPHONE ENCOUNTER
----- Message from Nneka Greenwood sent at 9/17/2024  9:18 AM CDT -----  Contact: IRISH Tam@[phone]  PT calling to speak with the nurse regarding questions about her medication. (No other information was given) Please call to advise.

## 2024-09-17 NOTE — TELEPHONE ENCOUNTER
Spoke with pt who stated her Ozempic is $184 and it is too expensive for her. Stated she is looking for an alternative at this time, Please advise.

## 2024-09-19 ENCOUNTER — TELEPHONE (OUTPATIENT)
Dept: FAMILY MEDICINE | Facility: CLINIC | Age: 59
End: 2024-09-19
Payer: COMMERCIAL

## 2024-09-19 DIAGNOSIS — J30.1 SEASONAL ALLERGIC RHINITIS DUE TO POLLEN: Primary | ICD-10-CM

## 2024-09-19 NOTE — TELEPHONE ENCOUNTER
----- Message from Simin Schwab sent at 9/19/2024 10:11 AM CDT -----  Regarding: allergy med  Contact: PT  [phone]  The patient called requesting to get medication called in for her sinus problems, PT req medication to address allergies. Please call in something to     Send script to   [facility] or [facility] in [place]    No further information provided    Patient can be contacted @# [phone]

## 2024-09-20 RX ORDER — FLUTICASONE PROPIONATE 50 MCG
SPRAY, SUSPENSION (ML) NASAL
Qty: 16 G | Refills: 12 | Status: SHIPPED | OUTPATIENT
Start: 2024-09-20 | End: 2024-09-20

## 2024-09-20 RX ORDER — METHYLPREDNISOLONE 4 MG/1
TABLET ORAL
Qty: 21 TABLET | Refills: 0 | Status: SHIPPED | OUTPATIENT
Start: 2024-09-20

## 2024-09-20 NOTE — TELEPHONE ENCOUNTER
No orders of the defined types were placed in this encounter.      Medications Ordered This Encounter   Medications    fluticasone propionate (FLONASE) 50 mcg/actuation nasal spray     Sig: Use 2 puffs in each nostril q day.     Dispense:  16 g     Refill:  12    methylPREDNISolone (MEDROL DOSEPACK) 4 mg tablet     Sig: follow package directions     Dispense:  21 tablet     Refill:  0

## 2024-09-25 ENCOUNTER — TELEPHONE (OUTPATIENT)
Dept: FAMILY MEDICINE | Facility: CLINIC | Age: 59
End: 2024-09-25
Payer: COMMERCIAL

## 2024-09-25 DIAGNOSIS — R73.03 PREDIABETES: ICD-10-CM

## 2024-09-25 RX ORDER — SEMAGLUTIDE 1 MG/.5ML
1 INJECTION, SOLUTION SUBCUTANEOUS
Qty: 3 ML | Refills: 4 | Status: SHIPPED | OUTPATIENT
Start: 2024-09-25

## 2024-09-25 NOTE — TELEPHONE ENCOUNTER
----- Message from Razia Rader sent at 9/25/2024 10:19 AM CDT -----  Contact: [phone]  Pt needs a refill on semaglutide, weight loss, (WEGOVY) 1 mg/0.5 mL Colleen called into     [facility] [address] [address]  [address] [facility] [address], [address]  [facility] [address]  Phone: [phone] Fax: [phone]      Pt mom/dad/guardian can be reached at [phone]     Anel is calling to say the pharmacy is waiting to receive the orders for the pt's weight loss medication. Please call Anel back for advice   normal...

## 2024-09-25 NOTE — TELEPHONE ENCOUNTER
No care due was identified.  [facility] Embedded Care Due Messages. Reference number: [id number].   9/25/2024 10:24:08 AM CDT

## 2024-09-25 NOTE — TELEPHONE ENCOUNTER
----- Message from Betito Salguero sent at 9/25/2024  3:33 PM CDT -----  Contact: [phone]  Type:  Patient Returning Call    Who Called:KIRBY STOCKTON [[id number]]  Who Left Message for Patient:  Does the patient know what this is regarding?:need a refill on her medications  Would the patient rather a call back or a response via [facility]? Call back  Best Call Back Number: [phone]  Additional Information: n [phone]

## 2024-09-25 NOTE — TELEPHONE ENCOUNTER
Returned patient's call. Informed patient that once the message was reviewed by Dr. Christie, we would give her a call. Patient verbalized understanding.

## 2024-10-07 ENCOUNTER — OFFICE VISIT (OUTPATIENT)
Dept: FAMILY MEDICINE | Facility: CLINIC | Age: 59
End: 2024-10-07
Payer: COMMERCIAL

## 2024-10-07 ENCOUNTER — HOSPITAL ENCOUNTER (OUTPATIENT)
Dept: RADIOLOGY | Facility: HOSPITAL | Age: 59
Discharge: HOME | End: 2024-10-07
Attending: STUDENT IN AN ORGANIZED HEALTH CARE EDUCATION/TRAINING PROGRAM
Payer: COMMERCIAL

## 2024-10-07 VITALS
SYSTOLIC BLOOD PRESSURE: 138 MMHG | HEART RATE: 63 BPM | DIASTOLIC BLOOD PRESSURE: 82 MMHG | BODY MASS INDEX: 30.31 KG/M2 | WEIGHT: 216.5 LBS | TEMPERATURE: 98 F | OXYGEN SATURATION: 99 % | RESPIRATION RATE: 18 BRPM | HEIGHT: 71 IN

## 2024-10-07 DIAGNOSIS — M54.2 NECK PAIN: ICD-10-CM

## 2024-10-07 DIAGNOSIS — H65.03 NON-RECURRENT ACUTE SEROUS OTITIS MEDIA OF BOTH EARS: Primary | ICD-10-CM

## 2024-10-07 PROCEDURE — 3079F DIAST BP 80-89 MM HG: CPT | Mod: CPTII,S$GLB,, | Performed by: STUDENT IN AN ORGANIZED HEALTH CARE EDUCATION/TRAINING PROGRAM

## 2024-10-07 PROCEDURE — 3008F BODY MASS INDEX DOCD: CPT | Mod: CPTII,S$GLB,, | Performed by: STUDENT IN AN ORGANIZED HEALTH CARE EDUCATION/TRAINING PROGRAM

## 2024-10-07 PROCEDURE — 3075F SYST BP GE 130 - 139MM HG: CPT | Mod: CPTII,S$GLB,, | Performed by: STUDENT IN AN ORGANIZED HEALTH CARE EDUCATION/TRAINING PROGRAM

## 2024-10-07 PROCEDURE — 72050 X-RAY EXAM NECK SPINE 4/5VWS: CPT | Mod: TC,PO

## 2024-10-07 PROCEDURE — 99214 OFFICE O/P EST MOD 30 MIN: CPT | Mod: S$GLB,,, | Performed by: STUDENT IN AN ORGANIZED HEALTH CARE EDUCATION/TRAINING PROGRAM

## 2024-10-07 PROCEDURE — 3066F NEPHROPATHY DOC TX: CPT | Mod: CPTII,S$GLB,, | Performed by: STUDENT IN AN ORGANIZED HEALTH CARE EDUCATION/TRAINING PROGRAM

## 2024-10-07 PROCEDURE — 72050 X-RAY EXAM NECK SPINE 4/5VWS: CPT | Mod: 26,,, | Performed by: RADIOLOGY

## 2024-10-07 PROCEDURE — 3044F HG A1C LEVEL LT 7.0%: CPT | Mod: CPTII,S$GLB,, | Performed by: STUDENT IN AN ORGANIZED HEALTH CARE EDUCATION/TRAINING PROGRAM

## 2024-10-07 PROCEDURE — 99999 PR PBB SHADOW E&M-EST. PATIENT-LVL V: CPT | Mod: PBBFAC,,, | Performed by: STUDENT IN AN ORGANIZED HEALTH CARE EDUCATION/TRAINING PROGRAM

## 2024-10-07 PROCEDURE — 3061F NEG MICROALBUMINURIA REV: CPT | Mod: CPTII,S$GLB,, | Performed by: STUDENT IN AN ORGANIZED HEALTH CARE EDUCATION/TRAINING PROGRAM

## 2024-10-07 PROCEDURE — 1160F RVW MEDS BY RX/DR IN RCRD: CPT | Mod: CPTII,S$GLB,, | Performed by: STUDENT IN AN ORGANIZED HEALTH CARE EDUCATION/TRAINING PROGRAM

## 2024-10-07 PROCEDURE — 1159F MED LIST DOCD IN RCRD: CPT | Mod: CPTII,S$GLB,, | Performed by: STUDENT IN AN ORGANIZED HEALTH CARE EDUCATION/TRAINING PROGRAM

## 2024-10-07 PROCEDURE — G2211 COMPLEX E/M VISIT ADD ON: HCPCS | Mod: S$GLB,,, | Performed by: STUDENT IN AN ORGANIZED HEALTH CARE EDUCATION/TRAINING PROGRAM

## 2024-10-07 RX ORDER — DICLOFENAC SODIUM 10 MG/G
2 GEL TOPICAL 2 TIMES DAILY PRN
Qty: 200 G | Refills: 2 | Status: SHIPPED | OUTPATIENT
Start: 2024-10-07

## 2024-10-07 RX ORDER — AMOXICILLIN AND CLAVULANATE POTASSIUM 875; 125 MG/1; MG/1
1 TABLET, FILM COATED ORAL 2 TIMES DAILY
Qty: 20 TABLET | Refills: 0 | Status: SHIPPED | OUTPATIENT
Start: 2024-10-07 | End: 2024-10-17

## 2024-10-07 RX ORDER — FLUCONAZOLE 150 MG/1
TABLET ORAL
Qty: 2 TABLET | Refills: 1 | Status: SHIPPED | OUTPATIENT
Start: 2024-10-07

## 2024-10-07 NOTE — PROGRESS NOTES
Problem List Items Addressed This Visit          ENT    Non-recurrent acute serous otitis media of both ears - Primary    Relevant Medications    amoxicillin-clavulanate 875-125mg (AUGMENTIN) 875-125 mg per tablet    fluconazole (DIFLUCAN) 150 MG Tab       Orthopedic    Neck pain    Overview     Supple, no lad, no fever  Likely msk  Prn heat, massages, tylenol          Relevant Medications    diclofenac sodium (VOLTAREN) 1 % Gel    Other Relevant Orders    X-Ray Cervical Spine Complete 5 view (Completed)        Assessment & Plan    EAR INFECTION:  -started Keflex for ear infection.    HEADACHE:  - Considered neck and muscle tension as potential contributors to head pain.  - Explained the connection between neck muscles and base of skull, relating to patient's pain.    HYPERLIPIDEMIA:  - Reassessed need for high-dose statin (80mg Lipitor) given patient's weight loss and lack of recent cardiovascular events.  - Determined to discontinue statin temporarily to evaluate if it is the source of head pain.  - Discontinued Lipitor 80mg for few months.  - Fasting labs ordered to check cholesterol levels.  - Follow up after lab work is completed to reassess cholesterol levels and need for statin medication.    LEFT BUTTOCK/THIGH PAIN:  - Evaluated left buttock/thigh pain, finding no visible abnormalities.    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  - Patient to obtain new pillows.  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  - Patient to use heating pad for neck tension.  - Recommend performing neck stretches and massages.  - Patient to use massage roller for neck and buttock area.  - Started Voltaren gel for neck and muscle pain.    GENERAL HEALTH RECOMMENDATIONS:  - Recommend increasing water intake.  - Patient to continue current exercise regimen (biking, walking, jogging).    F/u as needed and/or in 6 months     Irene  MD Pratik  _________________________________________________________________________      Patient ID: Anel Schmitt is a 59 y.o. female.    History of Present Illness    CHIEF COMPLAINT:  Patient presents today for ear pain and head discomfort.    EAR AND HEAD DISCOMFORT:  She reports bilateral ear pain and discomfort in the back of her head for approximately two weeks, with the right ear being more bothersome. She describes a feeling of fluid in her ear and tension in her neck and back of head. The pain worsens when lying down. She has been using a heat pad for relief and reports that chiropractic treatment provided some relief. She denies fever, chills, vision changes, dizziness, or lymph node pain/swelling.    MUSCULOSKELETAL PAIN:  She reports pain in the left buttock and upper thigh area for the past couple of days without known cause or trauma. She denies observing any visible abnormalities such as redness, bruising, or lesions in the affected area, normal exam    MEDICATIONS:  She is currently taking Lipitor 80mg at night for cholesterol management. She expresses concern about experiencing pressure behind her head and discomfort when taking this medication. She questions the necessity of the current dosage, noting weight loss and inquiring about the possibility of reducing or discontinuing it. She is also using Wegovy for weight management.    WEIGHT MANAGEMENT:  She has set a goal to reach 180 lbs. Her exercise routine includes biking, walking, and jogging. She has always worked out.    OCCUPATION:  She works as a  and expresses concerns about her posture while driving, noting that she often sits in a hunched position for extended periods, up to two hours at a time. She recognizes this poor posture may be contributing to her neck and back discomfort and reports attempting to find solutions to improve her posture while driving.           Past medical histories reviewed, including past  medical, surgical, family and social histories.      Current Outpatient Medications on File Prior to Visit   Medication Sig Dispense Refill    amLODIPine (NORVASC) 5 MG tablet Take 1 tablet (5 mg total) by mouth once daily. 90 tablet 5    azelastine (ASTELIN) 137 mcg (0.1 %) nasal spray 1 spray (137 mcg total) by Nasal route 2 (two) times daily. 30 mL 3    clotrimazole-betamethasone 1-0.05% (LOTRISONE) cream Apply topically 2 (two) times daily. 45 g 0    fluorometholone 0.1% (FML) 0.1 % DrpS Place into both eyes.      hydroCHLOROthiazide (HYDRODIURIL) 12.5 MG Tab Take 1 tablet (12.5 mg total) by mouth once daily. 90 tablet 3    levalbuterol (XOPENEX) 1.25 mg/3 mL nebulizer solution Take 3 mLs (1.25 mg total) by nebulization every 6 (six) hours as needed for Wheezing. Rescue 3 mL 11    levocetirizine (XYZAL) 5 MG tablet Take 1 tablet (5 mg total) by mouth every evening. 90 tablet 3    LIDOcaine-prilocaine (EMLA) cream Apply topically as needed (leg pain). 30 g 11    linaCLOtide (LINZESS) 145 mcg Cap capsule Take 1 capsule (145 mcg total) by mouth before breakfast. 90 capsule 1    nystatin (MYCOSTATIN) powder Apply topically 2 (two) times daily. 60 g 11    ondansetron (ZOFRAN-ODT) 4 MG TbDL Take 2 tablets (8 mg total) by mouth 2 (two) times daily. 20 tablet 1    oxybutynin (DITROPAN-XL) 5 MG TR24 Take 1 tablet (5 mg total) by mouth once daily. 90 tablet 3    pantoprazole (PROTONIX) 40 MG tablet Take 1 tablet (40 mg total) by mouth once daily. 90 tablet 3    semaglutide, weight loss, (WEGOVY) 1 mg/0.5 mL PnIj Inject 1 mg into the skin every 7 days. 3 mL 4    simethicone (MYLICON) 125 mg Cap capsule Take 1 capsule (125 mg total) by mouth 4 (four) times daily as needed for Flatulence. 90 capsule 2    tobramycin-dexAMETHasone 0.3-0.1% (TOBRADEX) 0.3-0.1 % DrpS Place into both eyes.      triamcinolone acetonide 0.1% (KENALOG) 0.1 % cream Apply topically 2 (two) times daily. 28.4 g 1    estradioL (ESTRACE) 0.01 % (0.1  mg/gram) vaginal cream Place 3 g vaginally twice a week. 24 g 11    sumatriptan (IMITREX) 50 MG tablet Take 1 tablet (50 mg total) by mouth once. for 1 dose 10 tablet 0     No current facility-administered medications on file prior to visit.       Review of Systems   12 point review of systems negative except for listed in HPI.     Objective:    Nursing note and vitals reviewed.  Vitals:    10/07/24 0917   BP: 138/82   Pulse: 63   Resp: 18   Temp: 98 °F (36.7 °C)     Body mass index is 30.2 kg/m².   Constitutional:  No acute distress  HEENT:  Head normocephalic and atraumatic. PERRL, EOMI. No scleral icterus or erythema.   R ear: The pinna and ear canal wnl. Erythematous TM wnl, bulging, + fluid  L ear: The pinna and ear canal wnl. TM wnl, non bulging, no fluid  Pharynx moist, no exudate, mildly erythematous   Neck: Normal range of motion. Neck supple. No LAD  Cardiovascular: Normal rate  Pulmonary/Chest: Effort normal. No respiratory distress. CTAB, no wheezing  Musculoskeletal: Normal range of motion.  Neurological: CN II-XII intact  Skin: warm and dry.   Psychiatric: normal mood and affect. behavior is normal.                 We Offer Telehealth & Same Day Appointments!   Book your Telehealth appointment with me through my nurse or   Clinic appointments on [facility]!  [phone]     To Schedule appointments online, go to [facility]: [email or web address]       Visit today included increased complexity associated with the care of the episodic problem addressed and managing the longitudinal care of the patient due to the serious and/or complex managed problem(s) as per problem list.

## 2024-10-07 NOTE — PROGRESS NOTES
I have sent a msg to patient with the following interpretation (see below):    Xr neck = arthritis     Continue treatment plan as discussed in clinic.    Please do not hesitate to call or message with any questions or concerns    Irene Christie MD

## 2024-10-09 ENCOUNTER — LAB VISIT (OUTPATIENT)
Dept: LAB | Facility: HOSPITAL | Age: 59
End: 2024-10-09
Attending: STUDENT IN AN ORGANIZED HEALTH CARE EDUCATION/TRAINING PROGRAM
Payer: COMMERCIAL

## 2024-10-09 DIAGNOSIS — I10 ESSENTIAL HYPERTENSION: ICD-10-CM

## 2024-10-09 DIAGNOSIS — R73.03 PREDIABETES: ICD-10-CM

## 2024-10-09 DIAGNOSIS — E66.811 CLASS 1 OBESITY DUE TO EXCESS CALORIES WITH SERIOUS COMORBIDITY AND BODY MASS INDEX (BMI) OF 30.0 TO 30.9 IN ADULT: ICD-10-CM

## 2024-10-09 DIAGNOSIS — E66.09 CLASS 1 OBESITY DUE TO EXCESS CALORIES WITH SERIOUS COMORBIDITY AND BODY MASS INDEX (BMI) OF 30.0 TO 30.9 IN ADULT: ICD-10-CM

## 2024-10-09 DIAGNOSIS — E03.9 ACQUIRED HYPOTHYROIDISM: ICD-10-CM

## 2024-10-09 LAB
ALBUMIN SERPL BCP-MCNC: 4.2 G/DL (ref 3.5–5.2)
ALBUMIN/CREAT UR: 4.3 UG/MG (ref 0–30)
ALP SERPL-CCNC: 87 U/L (ref 55–135)
ALT SERPL W/O P-5'-P-CCNC: 29 U/L (ref 10–44)
ANION GAP SERPL CALC-SCNC: 8 MMOL/L (ref 8–16)
AST SERPL-CCNC: 22 U/L (ref 10–40)
BASOPHILS # BLD AUTO: 0.05 K/UL (ref 0–0.2)
BASOPHILS NFR BLD: 1.4 % (ref 0–1.9)
BILIRUB SERPL-MCNC: 0.6 MG/DL (ref 0.1–1)
BUN SERPL-MCNC: 9 MG/DL (ref 6–20)
CALCIUM SERPL-MCNC: 9.7 MG/DL (ref 8.7–10.5)
CHLORIDE SERPL-SCNC: 102 MMOL/L (ref 95–110)
CHOLEST SERPL-MCNC: 160 MG/DL (ref 120–199)
CHOLEST/HDLC SERPL: 2.5 {RATIO} (ref 2–5)
CO2 SERPL-SCNC: 27 MMOL/L (ref 23–29)
CREAT SERPL-MCNC: 0.9 MG/DL (ref 0.5–1.4)
CREAT UR-MCNC: 444 MG/DL (ref 15–325)
DIFFERENTIAL METHOD BLD: ABNORMAL
EOSINOPHIL # BLD AUTO: 0.1 K/UL (ref 0–0.5)
EOSINOPHIL NFR BLD: 2.8 % (ref 0–8)
ERYTHROCYTE [DISTWIDTH] IN BLOOD BY AUTOMATED COUNT: 14.5 % (ref 11.5–14.5)
EST. GFR  (NO RACE VARIABLE): >60 ML/MIN/1.73 M^2
ESTIMATED AVG GLUCOSE: 105 MG/DL (ref 68–131)
GLUCOSE SERPL-MCNC: 76 MG/DL (ref 70–110)
HBA1C MFR BLD: 5.3 % (ref 4–5.6)
HCT VFR BLD AUTO: 40.6 % (ref 37–48.5)
HDLC SERPL-MCNC: 65 MG/DL (ref 40–75)
HDLC SERPL: 40.6 % (ref 20–50)
HGB BLD-MCNC: 13.2 G/DL (ref 12–16)
IMM GRANULOCYTES # BLD AUTO: 0.01 K/UL (ref 0–0.04)
IMM GRANULOCYTES NFR BLD AUTO: 0.3 % (ref 0–0.5)
LDLC SERPL CALC-MCNC: 82.8 MG/DL (ref 63–159)
LYMPHOCYTES # BLD AUTO: 1.5 K/UL (ref 1–4.8)
LYMPHOCYTES NFR BLD: 40.2 % (ref 18–48)
MCH RBC QN AUTO: 28.8 PG (ref 27–31)
MCHC RBC AUTO-ENTMCNC: 32.5 G/DL (ref 32–36)
MCV RBC AUTO: 89 FL (ref 82–98)
MICROALBUMIN UR DL<=1MG/L-MCNC: 19 UG/ML
MONOCYTES # BLD AUTO: 0.5 K/UL (ref 0.3–1)
MONOCYTES NFR BLD: 14.4 % (ref 4–15)
NEUTROPHILS # BLD AUTO: 1.5 K/UL (ref 1.8–7.7)
NEUTROPHILS NFR BLD: 40.9 % (ref 38–73)
NONHDLC SERPL-MCNC: 95 MG/DL
NRBC BLD-RTO: 0 /100 WBC
PLATELET # BLD AUTO: 250 K/UL (ref 150–450)
PMV BLD AUTO: 10.4 FL (ref 9.2–12.9)
POTASSIUM SERPL-SCNC: 3.6 MMOL/L (ref 3.5–5.1)
PROT SERPL-MCNC: 7.6 G/DL (ref 6–8.4)
RBC # BLD AUTO: 4.59 M/UL (ref 4–5.4)
SODIUM SERPL-SCNC: 137 MMOL/L (ref 136–145)
TRIGL SERPL-MCNC: 61 MG/DL (ref 30–150)
TSH SERPL DL<=0.005 MIU/L-ACNC: 0.71 UIU/ML (ref 0.4–4)
WBC # BLD AUTO: 3.61 K/UL (ref 3.9–12.7)

## 2024-10-09 PROCEDURE — 84443 ASSAY THYROID STIM HORMONE: CPT | Performed by: STUDENT IN AN ORGANIZED HEALTH CARE EDUCATION/TRAINING PROGRAM

## 2024-10-09 PROCEDURE — 83036 HEMOGLOBIN GLYCOSYLATED A1C: CPT | Performed by: STUDENT IN AN ORGANIZED HEALTH CARE EDUCATION/TRAINING PROGRAM

## 2024-10-09 PROCEDURE — 85025 COMPLETE CBC W/AUTO DIFF WBC: CPT | Performed by: STUDENT IN AN ORGANIZED HEALTH CARE EDUCATION/TRAINING PROGRAM

## 2024-10-09 PROCEDURE — 82570 ASSAY OF URINE CREATININE: CPT | Performed by: STUDENT IN AN ORGANIZED HEALTH CARE EDUCATION/TRAINING PROGRAM

## 2024-10-09 PROCEDURE — 80053 COMPREHEN METABOLIC PANEL: CPT | Performed by: STUDENT IN AN ORGANIZED HEALTH CARE EDUCATION/TRAINING PROGRAM

## 2024-10-09 PROCEDURE — 36415 COLL VENOUS BLD VENIPUNCTURE: CPT | Mod: PO | Performed by: STUDENT IN AN ORGANIZED HEALTH CARE EDUCATION/TRAINING PROGRAM

## 2024-10-09 PROCEDURE — 80061 LIPID PANEL: CPT | Performed by: STUDENT IN AN ORGANIZED HEALTH CARE EDUCATION/TRAINING PROGRAM

## 2024-10-10 NOTE — PROGRESS NOTES
I have sent a msg to patient with the following interpretation (see below):    Labs ok    Please do not hesitate to call or message with any questions or concerns    Irene Christie MD RENAL DAILY PROGRESS NOTE            [de-identified] 

## 2024-10-12 PROBLEM — H65.03 NON-RECURRENT ACUTE SEROUS OTITIS MEDIA OF BOTH EARS: Status: ACTIVE | Noted: 2024-10-12

## 2024-10-12 PROBLEM — M54.2 NECK PAIN: Status: ACTIVE | Noted: 2024-10-12

## 2024-10-15 ENCOUNTER — OFFICE VISIT (OUTPATIENT)
Dept: FAMILY MEDICINE | Facility: CLINIC | Age: 59
End: 2024-10-15
Payer: COMMERCIAL

## 2024-10-15 ENCOUNTER — TELEPHONE (OUTPATIENT)
Dept: FAMILY MEDICINE | Facility: CLINIC | Age: 59
End: 2024-10-15

## 2024-10-15 VITALS
BODY MASS INDEX: 30.52 KG/M2 | OXYGEN SATURATION: 98 % | RESPIRATION RATE: 14 BRPM | DIASTOLIC BLOOD PRESSURE: 95 MMHG | HEART RATE: 65 BPM | WEIGHT: 218 LBS | HEIGHT: 71 IN | SYSTOLIC BLOOD PRESSURE: 143 MMHG

## 2024-10-15 DIAGNOSIS — E78.5 HYPERLIPIDEMIA, UNSPECIFIED HYPERLIPIDEMIA TYPE: ICD-10-CM

## 2024-10-15 DIAGNOSIS — M99.02 SOMATIC DYSFUNCTION OF SPINE, THORACIC: Primary | ICD-10-CM

## 2024-10-15 DIAGNOSIS — I10 ESSENTIAL HYPERTENSION: ICD-10-CM

## 2024-10-15 DIAGNOSIS — M99.01 CERVICAL SOMATIC DYSFUNCTION: ICD-10-CM

## 2024-10-15 DIAGNOSIS — S46.819A STRAIN OF TRAPEZIUS MUSCLE, UNSPECIFIED LATERALITY, INITIAL ENCOUNTER: ICD-10-CM

## 2024-10-15 PROCEDURE — 1160F RVW MEDS BY RX/DR IN RCRD: CPT | Mod: CPTII,S$GLB,, | Performed by: DIETITIAN, REGISTERED

## 2024-10-15 PROCEDURE — 99214 OFFICE O/P EST MOD 30 MIN: CPT | Mod: 25,S$GLB,, | Performed by: DIETITIAN, REGISTERED

## 2024-10-15 PROCEDURE — 3061F NEG MICROALBUMINURIA REV: CPT | Mod: CPTII,S$GLB,, | Performed by: DIETITIAN, REGISTERED

## 2024-10-15 PROCEDURE — 3044F HG A1C LEVEL LT 7.0%: CPT | Mod: CPTII,S$GLB,, | Performed by: DIETITIAN, REGISTERED

## 2024-10-15 PROCEDURE — 3008F BODY MASS INDEX DOCD: CPT | Mod: CPTII,S$GLB,, | Performed by: DIETITIAN, REGISTERED

## 2024-10-15 PROCEDURE — 99999 PR PBB SHADOW E&M-EST. PATIENT-LVL V: CPT | Mod: PBBFAC,,, | Performed by: DIETITIAN, REGISTERED

## 2024-10-15 PROCEDURE — 1159F MED LIST DOCD IN RCRD: CPT | Mod: CPTII,S$GLB,, | Performed by: DIETITIAN, REGISTERED

## 2024-10-15 PROCEDURE — 3080F DIAST BP >= 90 MM HG: CPT | Mod: CPTII,S$GLB,, | Performed by: DIETITIAN, REGISTERED

## 2024-10-15 PROCEDURE — 3077F SYST BP >= 140 MM HG: CPT | Mod: CPTII,S$GLB,, | Performed by: DIETITIAN, REGISTERED

## 2024-10-15 PROCEDURE — 3066F NEPHROPATHY DOC TX: CPT | Mod: CPTII,S$GLB,, | Performed by: DIETITIAN, REGISTERED

## 2024-10-15 PROCEDURE — 98925 OSTEOPATH MANJ 1-2 REGIONS: CPT | Mod: S$GLB,,, | Performed by: DIETITIAN, REGISTERED

## 2024-10-15 RX ORDER — AMLODIPINE BESYLATE 2.5 MG/1
2.5 TABLET ORAL DAILY
Qty: 30 TABLET | Refills: 3 | Status: SHIPPED | OUTPATIENT
Start: 2024-10-15

## 2024-10-15 RX ORDER — MELOXICAM 15 MG/1
15 TABLET ORAL DAILY
Qty: 30 TABLET | Refills: 2 | Status: SHIPPED | OUTPATIENT
Start: 2024-10-15

## 2024-10-15 RX ORDER — METHOCARBAMOL 750 MG/1
750 TABLET, FILM COATED ORAL 4 TIMES DAILY
Qty: 40 TABLET | Refills: 2 | Status: SHIPPED | OUTPATIENT
Start: 2024-10-15

## 2024-10-15 RX ORDER — ROSUVASTATIN CALCIUM 20 MG/1
20 TABLET, COATED ORAL DAILY
Qty: 90 TABLET | Refills: 3 | Status: SHIPPED | OUTPATIENT
Start: 2024-10-15 | End: 2025-10-15

## 2024-10-15 NOTE — TELEPHONE ENCOUNTER
Please assist with PA     pantoprazole (PROTONIX) 40 MG tablet   Gastroesophageal reflux disease with esophagitis without hemorrhage [K21.00]

## 2024-10-15 NOTE — PROGRESS NOTES
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  - Discussed that symptom relief will not be immediate and will require ongoing management.  - Reviewed potential side effects of muscle relaxants, including possible dizziness or foggy feeling.  - Anel to perform trap stretches regularly, especially when stopped at red lights while driving.  - Recommend strengthening back muscles with recommended exercises.  - Anel to apply heat pack to affected areas for symptom relief.  - Started Robaxin (methocarbamol): Take as directed for muscle relaxation.  - Started Mobic (meloxicam): Take as prescribed for anti-inflammatory effects.  - Referred to physical therapy at [facility] for shoulder and trap muscle strain treatment, including stretching and TENS unit application.    HYPERTENSION:  - Anel to monitor blood pressure at home using arm cuff and report readings to Dr. Christie.  - Restarted Norvasc 2.5 mg: Take daily for blood pressure control.  - Contact office if blood pressure decreases significantly after restarting Norvasc.  - Anel to report blood pressure readings prior to follow-up visit.    HYPERLIPIDEMIA:  - Started Crestor 20 mg: Take nightly for cholesterol management.  -  Discontinued Lipitor 80 mg.    INFECTION:  - Continued Augmentin: Complete remaining antibiotic course, taking twice daily as prescribed.    FOLLOW UP:  - Follow up in 4 weeks.        Problem List Items Addressed This Visit       Essential hypertension     Restarted Norvasc 2.5 mg today.   Continue ambulatory BP readings         Relevant Medications    amLODIPine (NORVASC) 2.5 MG tablet    Hyperlipidemia     Changed Lipitor 80 mg to Crestor 20 mg due to myalgias described.          Relevant Medications    rosuvastatin (CRESTOR) 20 MG tablet    Trapezius strain     OMT performed today as described.  - Robaxin and Mobic prescribed for follow up         Relevant Medications    meloxicam (MOBIC) 15 MG tablet    Other Relevant Orders    Ambulatory referral/consult to Physical/Occupational Therapy    Somatic dysfunction of spine, thoracic - Primary     Chronically hypertonic trapezius muscle with suspected peripheral nerve entrapment causing burning pain.   - Performed OMT today (suboccipital inhibition, upper thoracic extension and lateral traction with shoulder block supine - side-bending/rotation method and bilateral trapezius pressure direct inhibitory method)  - Noted improvement in muscle tension and muscle hypertonicity post-procedure          Relevant Medications    methocarbamoL (ROBAXIN) 750 MG Tab    meloxicam (MOBIC) 15 MG tablet    Other Relevant Orders    Ambulatory referral/consult to Physical/Occupational Therapy     Future Appointments       Date Provider Specialty Appt Notes    10/17/2024 Ilir Mercado DPM Podiatry Left toe pain    12/4/2024 Irene Christie MD Family Medicine 4 week follow up per            Medication Management for assessment above:   Medication List with Changes/Refills   New Medications    MELOXICAM (MOBIC) 15 MG TABLET    Take 1 tablet (15 mg total) by mouth once daily.    METHOCARBAMOL (ROBAXIN) 750 MG TAB    Take 1 tablet (750 mg total) by mouth 4 (four) times daily.     ROSUVASTATIN (CRESTOR) 20 MG TABLET    Take 1 tablet (20 mg total) by mouth once daily.   Current Medications    AMOXICILLIN-CLAVULANATE 875-125MG (AUGMENTIN) 875-125 MG PER TABLET    Take 1 tablet by mouth 2 (two) times daily. Take with food. for 10 days    AZELASTINE (ASTELIN) 137 MCG (0.1 %) NASAL SPRAY    1 spray (137 mcg total) by Nasal route 2 (two) times daily.    CLOTRIMAZOLE-BETAMETHASONE 1-0.05% (LOTRISONE) CREAM    Apply topically 2 (two) times daily.    DICLOFENAC SODIUM (VOLTAREN) 1 % GEL    Apply 2 g topically 2 (two) times daily as needed (for arthritis pain).    ESTRADIOL (ESTRACE) 0.01 % (0.1 MG/GRAM) VAGINAL CREAM    Place 3 g vaginally twice a week.    FLUCONAZOLE (DIFLUCAN) 150 MG TAB    Take 1 tab by mouth on day 1; if symptoms persist after 72 hours, take 2nd tab    FLUOROMETHOLONE 0.1% (FML) 0.1 % DRPS    Place into both eyes.    HYDROCHLOROTHIAZIDE (HYDRODIURIL) 12.5 MG TAB    Take 1 tablet (12.5 mg total) by mouth once daily.    LEVALBUTEROL (XOPENEX) 1.25 MG/3 ML NEBULIZER SOLUTION    Take 3 mLs (1.25 mg total) by nebulization every 6 (six) hours as needed for Wheezing. Rescue    LEVOCETIRIZINE (XYZAL) 5 MG TABLET    Take 1 tablet (5 mg total) by mouth every evening.    LIDOCAINE-PRILOCAINE (EMLA) CREAM    Apply topically as needed (leg pain).    LINACLOTIDE (LINZESS) 145 MCG CAP CAPSULE    Take 1 capsule (145 mcg total) by mouth before breakfast.    NYSTATIN (MYCOSTATIN) POWDER    Apply topically 2 (two) times daily.    ONDANSETRON (ZOFRAN-ODT) 4 MG TBDL    Take 2 tablets (8 mg total) by mouth 2 (two) times daily.    OXYBUTYNIN (DITROPAN-XL) 5 MG TR24    Take 1 tablet (5 mg total) by mouth once daily.    PANTOPRAZOLE (PROTONIX) 40 MG TABLET    Take 1 tablet (40 mg total) by mouth once daily.    SEMAGLUTIDE, WEIGHT LOSS, (WEGOVY) 1 MG/0.5 ML PNIJ    Inject 1 mg into the skin every 7 days.    SIMETHICONE (MYLICON) 125 MG CAP CAPSULE    Take 1 capsule (125 mg total) by mouth 4 (four) times  "daily as needed for Flatulence.    SUMATRIPTAN (IMITREX) 50 MG TABLET    Take 1 tablet (50 mg total) by mouth once. for 1 dose    TOBRAMYCIN-DEXAMETHASONE 0.3-0.1% (TOBRADEX) 0.3-0.1 % DRPS    Place into both eyes.    TRIAMCINOLONE ACETONIDE 0.1% (KENALOG) 0.1 % CREAM    Apply topically 2 (two) times daily.   Changed and/or Refilled Medications    Modified Medication Previous Medication    AMLODIPINE (NORVASC) 2.5 MG TABLET amLODIPine (NORVASC) 5 MG tablet       Take 1 tablet (2.5 mg total) by mouth once daily.    Take 1 tablet (5 mg total) by mouth once daily.       Stella Knutson, DO, RDN  ==========================================================================  Subjective:   Patient ID: Anel Schmitt is a 59 y.o. female.  has a past medical history of Cancer, GERD (gastroesophageal reflux disease), and Hypertension.   Chief Complaint: Headache      History of Present Illness    CHIEF COMPLAINT:  Anel presents today for follow up on multiple concerns including ear pain, back pain, and a burning sensation in the head.    EAR AND HEAD SYMPTOMS:  She reports persistent ear pain and a feeling of her ears being constantly stopped up, despite antibiotic treatment. She experiences pain behind her ears and in the back of her head, described as a burning sensation, particularly noticeable when lying down. She denies headaches. The burning sensation in her head became more pronounced when taking cholesterol medication and persisted even after discontinuing it prior to her last appointment. She expresses concern that this sensation may be related to her cholesterol medication.    BACK PAIN AND MUSCULOSKELETAL ISSUES:  She reports ongoing back pain, with X-ray results revealing arthritis. Her trapezius muscles are described as "rock hard." She has been seeing a chiropractor for treatment, with acupuncture providing significant relief. As a , she experiences tension in her upper back and shoulders due " "to the driving posture, with a tingling sensation in the affected area.    MEDICATIONS:  She recently stopped her cholesterol medication but is currently taking Lipitor 80 mg for cholesterol management, reporting "perfect" cholesterol levels. She is taking Augmentin twice daily but has not completed the full course. She uses a nasal spray for allergies. She was previously prescribed Robaxin (muscle relaxer) for an unspecified condition. She is no longer taking Norvasc due to previously low blood pressure readings and was prescribed a different, smaller pill for blood pressure management, but is unsure of its name.    CARDIOVASCULAR:  She reports recently elevated blood pressure, acknowledging consumption of pig feet in the past two days as a potential contributor. She was previously taken off Norvasc due to low blood pressure readings.    ALLERGIES:  She reports experiencing allergy symptoms and inquires about potential relief from chiropractic care for her symptoms.    SLEEP:  She reported improved sleep quality and duration while on the previous cholesterol medication, speculating it may have had a muscle-relaxing effect. She denies current sleep issues since discontinuing the medication.    RECENT MEDICAL HISTORY:  She had an urgent care visit approximately two weeks ago and an appointment with Dr. Christei four weeks prior to the current visit. She reports no recent steroid injections.      ROS:  General: -fever, -chills, -fatigue, -weight gain, -weight loss  Eyes: -vision changes, -redness, -discharge  ENT: +ear pain, -nasal congestion, -sore throat  Cardiovascular: -chest pain, -palpitations, -lower extremity edema  Respiratory: -cough, -shortness of breath  Gastrointestinal: -abdominal pain, -nausea, -vomiting, -diarrhea, -constipation, -blood in stool  Genitourinary: -dysuria, -hematuria, -frequency  Musculoskeletal: -joint pain, -muscle pain, +back pain  Skin: -rash, -lesion  Neurological: -headache, " -dizziness, -numbness, -tingling  Psychiatric: -anxiety, -depression, -sleep difficulty  Allergic: +seasonal allergies          Problem List Items Addressed This Visit       Essential hypertension    Overview     Recently taken off Norvasc 5 mg, BP not controlled today. Remains on HCTZ 12.5 mg.    BP Readings from Last 3 Encounters:   10/15/24 (!) 143/95   10/07/24 138/82   08/21/24 128/82     Restarted Norvasc 2.5 mg today.   Continue ambulatory BP readings         Current Assessment & Plan     Restarted Norvasc 2.5 mg today.   Continue ambulatory BP readings         Hyperlipidemia    Overview     -chronic condition. Currently stable.    Lab Results   Component Value Date    CHOL 160 10/09/2024    CHOL 149 04/17/2023    CHOL 161 10/20/2022     Lab Results   Component Value Date    HDL 65 10/09/2024    HDL 64 04/17/2023    HDL 65 (H) 10/20/2022     Lab Results   Component Value Date    LDLCALC 82.8 10/09/2024    LDLCALC 73.4 04/17/2023    LDLCALC 78 10/20/2022     Lab Results   Component Value Date    TRIG 61 10/09/2024    TRIG 58 04/17/2023    TRIG 89 10/20/2022       Lab Results   Component Value Date    CHOLHDL 40.6 10/09/2024    CHOLHDL 43.0 04/17/2023    CHOLHDL 2.48 10/20/2022     Changed Lipitor 80 mg to Crestor 20 mg due to myalgias described.          Current Assessment & Plan     Changed Lipitor 80 mg to Crestor 20 mg due to myalgias described.          Trapezius strain    Current Assessment & Plan     OMT performed today as described.  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   - Performed OMT today (suboccipital inhibition, upper thoracic extension and lateral traction with shoulder block supine - side-bending/rotation method and bilateral trapezius pressure direct inhibitory method)  - Noted improvement in  "muscle tension and muscle hypertonicity post-procedure              Review of patient's allergies indicates:   Allergen Reactions    Codeine     Duloxetine Other (See Comments)     Pt stated cymbalta made her feel "crazy" in head. Pt stated she is unable to describe it any other way.    Flonase [fluticasone] Other (See Comments)     Headache     Penicillins      Current Outpatient Medications   Medication Instructions    amLODIPine (NORVASC) 2.5 mg, Oral, Daily    amoxicillin-clavulanate 875-125mg (AUGMENTIN) 875-125 mg per tablet 1 tablet, Oral, 2 times daily, Take with food.    azelastine (ASTELIN) 137 mcg, Nasal, 2 times daily    clotrimazole-betamethasone 1-0.05% (LOTRISONE) cream Topical (Top), 2 times daily    diclofenac sodium (VOLTAREN) 2 g, Topical (Top), 2 times daily PRN    estradioL (ESTRACE) 3 g, Vaginal, Twice weekly    fluconazole (DIFLUCAN) 150 MG Tab Take 1 tab by mouth on day 1; if symptoms persist after 72 hours, take 2nd tab    fluorometholone 0.1% (FML) 0.1 % DrpS Place into both eyes.    hydroCHLOROthiazide (HYDRODIURIL) 12.5 mg, Oral, Daily    levalbuterol (XOPENEX) 1.25 mg, Nebulization, Every 6 hours PRN, Rescue    levocetirizine (XYZAL) 5 mg, Oral, Nightly    LIDOcaine-prilocaine (EMLA) cream Topical (Top), As needed (PRN)    linaCLOtide (LINZESS) 145 mcg, Oral, Before breakfast    meloxicam (MOBIC) 15 mg, Oral, Daily    methocarbamoL (ROBAXIN) 750 mg, Oral, 4 times daily    nystatin (MYCOSTATIN) powder Topical (Top), 2 times daily    ondansetron (ZOFRAN-ODT) 8 mg, Oral, 2 times daily    oxybutynin (DITROPAN-XL) 5 mg, Oral, Daily    pantoprazole (PROTONIX) 40 mg, Oral, Daily    rosuvastatin (CRESTOR) 20 mg, Oral, Daily    simethicone (MYLICON) 125 mg, Oral, 4 times daily PRN    sumatriptan (IMITREX) 50 mg, Oral, Once    tobramycin-dexAMETHasone 0.3-0.1% (TOBRADEX) 0.3-0.1 % DrpS Place into both eyes.    triamcinolone acetonide 0.1% (KENALOG) 0.1 % cream Topical (Top), 2 times daily    " "WEGOVY 1 mg, Subcutaneous, Every 7 days      I have reviewed the PMH, social history, FamilyHx, surgical history, allergies and medications documented / confirmed by the patient at the time of this visit.      Objective:   BP (!) 143/95   Pulse 65   Resp 14   Ht 5' 11" (1.803 m)   Wt 98.9 kg (218 lb)   SpO2 98%   BMI 30.40 kg/m²     Physical Exam    Vitals: Blood pressure: 145/95.  General: No acute distress. Well-developed. Well-nourished.  Eyes: EOMI. Sclerae anicteric.  HENT: Normocephalic. Atraumatic. Nares patent. Moist oral mucosa.  Ears: Bilateral TMs clear. Bilateral EACs clear.  Cardiovascular: Regular rate. Regular rhythm. No murmurs. No rubs. No gallops. Normal S1, S2.  Respiratory: Normal respiratory effort. Clear to auscultation bilaterally. No rales. No rhonchi. No wheezing.  Abdomen: Soft. Non-tender. Non-distended. Normoactive bowel sounds.  Musculoskeletal: No  obvious deformity.  Extremities: No lower extremity edema.  Neurological: Alert & oriented x3. No slurred speech. Normal gait.  Psychiatric: Normal mood. Normal affect. Good insight. Good judgment.  Skin: Warm. Dry. No rash.  Back: Hypertonic trapezius muscles with multiple tenderpoints.       Procedure Note: OMT  After verbal consent obtained, performed the following OMT procedures: suboccipital inhibition, upper thoracic extension and lateral traction with shoulder block supine - side-bending/rotation method and bilateral trapezius pressure direct inhibitory method. Improvement in neck mobility without pain and trapezius muscle tension improvement noted. Patient tolerated the procedure well. Instructed to take Mobic and Robaxin, use heat, attend PT and stay hydrated.     Assessment:     1. Somatic dysfunction of spine, thoracic    2. Strain of trapezius muscle, unspecified laterality, initial encounter    3. Hyperlipidemia, unspecified hyperlipidemia type    4. Essential hypertension      MDM:   Moderate complexity, exacerbation of " chronic condition, medication changes to uncontrolled chronic conditions.  Visit today included increased complexity associated with the care of the episodic problem see above assessment addressed and managing the longitudinal care of the patient due to the serious and/or complex managed problem(s) see above.    I have reviewed and summarized old records.  I have performed thorough medication reconciliation today and discussed risk and benefits of medications.  I have reviewed labs and discussed with patient.  All questions were answered.    I have signed for the following orders AND/OR meds.  Orders Placed This Encounter   Procedures    Ambulatory referral/consult to Physical/Occupational Therapy     Standing Status:   Future     Standing Expiration Date:   11/15/2025     Referral Priority:   Routine     Referral Type:   Physical Medicine     Referral Reason:   Specialty Services Required     Requested Specialty:   Physical Therapy     Number of Visits Requested:   1     Medications Ordered This Encounter   Medications    amLODIPine (NORVASC) 2.5 MG tablet     Sig: Take 1 tablet (2.5 mg total) by mouth once daily.     Dispense:  30 tablet     Refill:  3     .    meloxicam (MOBIC) 15 MG tablet     Sig: Take 1 tablet (15 mg total) by mouth once daily.     Dispense:  30 tablet     Refill:  2    methocarbamoL (ROBAXIN) 750 MG Tab     Sig: Take 1 tablet (750 mg total) by mouth 4 (four) times daily.     Dispense:  40 tablet     Refill:  2    rosuvastatin (CRESTOR) 20 MG tablet     Sig: Take 1 tablet (20 mg total) by mouth once daily.     Dispense:  90 tablet     Refill:  3        Follow up in about 4 weeks (around 11/12/2024) for with Dr. Christie about neck pain.  Future Appointments       Date Provider Specialty Appt Notes    10/17/2024 Ilir Mercado DPM Podiatry Left toe pain    12/4/2024 Irene Christie MD Family Medicine 4 week follow up per             If no improvement in symptoms or symptoms worsen,  advised to call/follow-up at clinic or go to ER. Patient voiced understanding and all questions/concerns were addressed.     DISCLAIMER: This note was compiled by using a speech recognition dictation system and therefore please be aware that typographical / speech recognition errors can and do occur.  Please contact me if you see any errors specifically.     This note was generated with the assistance of ambient listening technology. Verbal consent was obtained by the patient and accompanying visitor(s) for the recording of patient appointment to facilitate this note. I attest to having reviewed and edited the generated note for accuracy, though some syntax or spelling errors may persist. Please contact the author of this note for any clarification.      Stella Knutson DO, RDN    Office: [phone]   [address] [place] [address]  FAX: [phone]

## 2024-10-15 NOTE — TELEPHONE ENCOUNTER
----- Message from Marie sent at 10/15/2024 10:00 AM CDT -----  Regarding: external referral for physical therapy  Patient came to me for checkout, her physical therapy referral is external for [facility] in [place]. This is correct location and patient's preference.    I'm able to do internal referrals only, please fax external referral to [facility] in [place].    Thanks

## 2024-10-15 NOTE — ASSESSMENT & PLAN NOTE
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   - Performed OMT today (suboccipital inhibition, upper thoracic extension and lateral traction with shoulder block supine - side-bending/rotation method and bilateral trapezius pressure direct inhibitory method)  - Noted improvement in muscle tension and muscle hypertonicity post-procedure

## 2024-10-15 NOTE — TELEPHONE ENCOUNTER
Please fax the referral that dr block signed, to the correct place. We cannot send external referrals to [facility]

## 2024-10-16 ENCOUNTER — TELEPHONE (OUTPATIENT)
Dept: FAMILY MEDICINE | Facility: CLINIC | Age: 59
End: 2024-10-16
Payer: COMMERCIAL

## 2024-10-16 NOTE — TELEPHONE ENCOUNTER
----- Message from Enrique sent at 10/16/2024  9:58 AM CDT -----  Type:  Needs Medical Advice    Who Called: KIRBY STOCKTON [[id number]]  Symptoms (please be specific):    How long has patient had these symptoms:    Pharmacy name and phone #:    Would the patient rather a call back or a response via [facility]?   Best Call Back Number:  [phone]  Additional Information:  Patient will like orders for a CT Scan

## 2024-10-16 NOTE — TELEPHONE ENCOUNTER
Spoke to pt about this and she is in agreement to wait approx. A month and will contact us if symptoms get worse.

## 2024-10-17 ENCOUNTER — OFFICE VISIT (OUTPATIENT)
Dept: PODIATRY | Facility: CLINIC | Age: 59
End: 2024-10-17
Payer: COMMERCIAL

## 2024-10-17 VITALS — BODY MASS INDEX: 30.53 KG/M2 | HEIGHT: 71 IN | WEIGHT: 218.06 LBS

## 2024-10-17 DIAGNOSIS — M79.675 PAIN IN LEFT TOE(S): ICD-10-CM

## 2024-10-17 DIAGNOSIS — M20.42 HAMMER TOE OF LEFT FOOT: Primary | ICD-10-CM

## 2024-10-17 PROCEDURE — 1159F MED LIST DOCD IN RCRD: CPT | Mod: CPTII,S$GLB,, | Performed by: PODIATRIST

## 2024-10-17 PROCEDURE — 3044F HG A1C LEVEL LT 7.0%: CPT | Mod: CPTII,S$GLB,, | Performed by: PODIATRIST

## 2024-10-17 PROCEDURE — 99999 PR PBB SHADOW E&M-EST. PATIENT-LVL IV: CPT | Mod: PBBFAC,,, | Performed by: PODIATRIST

## 2024-10-17 PROCEDURE — 99203 OFFICE O/P NEW LOW 30 MIN: CPT | Mod: S$GLB,,, | Performed by: PODIATRIST

## 2024-10-17 PROCEDURE — 3066F NEPHROPATHY DOC TX: CPT | Mod: CPTII,S$GLB,, | Performed by: PODIATRIST

## 2024-10-17 PROCEDURE — 3061F NEG MICROALBUMINURIA REV: CPT | Mod: CPTII,S$GLB,, | Performed by: PODIATRIST

## 2024-10-17 PROCEDURE — 1160F RVW MEDS BY RX/DR IN RCRD: CPT | Mod: CPTII,S$GLB,, | Performed by: PODIATRIST

## 2024-10-17 PROCEDURE — 3008F BODY MASS INDEX DOCD: CPT | Mod: CPTII,S$GLB,, | Performed by: PODIATRIST

## 2024-10-17 NOTE — PROGRESS NOTES
"Subjective:       Patient ID: Anel Schmitt is a 59 y.o. female.    Chief Complaint: Callouses (Left 5th toe Callouse: pt rates 10/10 pain, pt is nondiabetic, wearing flats )    HPI: Anel Schmitt presents to the office today, with complaints of pains to the left foot. The pains are stated as moderate to severe at the 5th toe. Patient states limping with gait at times due to the pains. Pains are exacerbated by walking and standing. Sitting and limited WB does alleviate and/or decrease the pains. The patient does have hammer toe contractures. Does have a history of B/L 5th digit hammer toe repair. States alternation of shoe gear has not been helpful. Patient's Primary Care Provider is Irene Christie MD.     Review of patient's allergies indicates:   Allergen Reactions    Codeine     Duloxetine Other (See Comments)     Pt stated cymbalta made her feel "crazy" in head. Pt stated she is unable to describe it any other way.    Flonase [fluticasone] Other (See Comments)     Headache     Penicillins        Past Medical History:   Diagnosis Date    Cancer     GERD (gastroesophageal reflux disease)     Hypertension        Family History   Problem Relation Name Age of Onset    Hypertension Mother      Hypertension Father         Social History     Socioeconomic History    Marital status: Single   Tobacco Use    Smoking status: Never    Smokeless tobacco: Never   Substance and Sexual Activity    Alcohol use: No    Drug use: No    Sexual activity: Not Currently   Social History Narrative    ** Merged History Encounter **          Social Drivers of Health     Financial Resource Strain: Low Risk  (8/9/2024)    Overall Financial Resource Strain (CARDIA)     Difficulty of Paying Living Expenses: Not hard at all   Food Insecurity: No Food Insecurity (8/9/2024)    Hunger Vital Sign     Worried About Running Out of Food in the Last Year: Never true     Ran Out of Food in the Last Year: Never true   Physical Activity: " "Sufficiently Active (8/9/2024)    Exercise Vital Sign     Days of Exercise per Week: 7 days     Minutes of Exercise per Session: 30 min   Stress: No Stress Concern Present (8/9/2024)    [employer] - Occupational Stress Questionnaire     Feeling of Stress : Not at all       Past Surgical History:   Procedure Laterality Date    gastric sleeve      HYSTERECTOMY      OOPHORECTOMY         Review of Systems   Constitutional:  Negative for chills, fatigue and fever.   HENT:  Negative for hearing loss.    Eyes:  Negative for photophobia and visual disturbance.   Respiratory:  Negative for cough, chest tightness, shortness of breath and wheezing.    Cardiovascular:  Negative for chest pain and palpitations.   Gastrointestinal:  Negative for constipation, diarrhea, nausea and vomiting.   Endocrine: Negative for cold intolerance and heat intolerance.   Genitourinary:  Negative for flank pain.   Musculoskeletal:  Positive for gait problem. Negative for neck pain and neck stiffness.   Neurological:  Negative for light-headedness and headaches.   Psychiatric/Behavioral:  Negative for sleep disturbance.          Objective:   Ht 5' 11" (1.803 m)   Wt 98.9 kg (218 lb 0.6 oz)   BMI 30.41 kg/m²       Physical Exam    LOWER EXTREMITY PHYSICAL EXAMINATION    DERMATOLOGY: Skin is supple, dry and intact. Callus formation, LLE, 5th toe.    ORTHOPEDIC: Manual Muscle Testing is 5/5 in all planes on the left foot, without pains, with and without resistance. Gait pattern is antalgic. There is mild semi-rigid hammer toe contracture to the left foot at the 5th toe. Associated metatarsalgia is noted.    Assessment:     1. Hammer toe of left foot    2. Pain in left toe(s)          Plan:     Hammer toe of left foot    Pain in left toe(s)      This patient does have hammertoe (digital) contractures. I did advise the patient to ambulate with shoe gear that is high in the tox box to allow for extra room and depth in the " sagittal plane, in order to alleviate and lessen the potential for dorsal digital break down at the IPJs. I do also recommended shoe gear that is soft and supple in the foot bed as to lessen the potential for plantar distal digital break down at the contracted digits. If the patient does not feel the aforementioned is necessary, he or she may also purchase OTC padding devices to be worn across the MTPJ, at the distal aspects of the digits, and/or at the dorsal aspects of the IPJs. The patient does acknowledge understanding and is said to be amenable to compliance.                Future Appointments   Date Time Provider Department Center   12/4/2024  9:20 AM Irene Christie MD [facility]

## 2024-10-21 DIAGNOSIS — K21.00 GASTROESOPHAGEAL REFLUX DISEASE WITH ESOPHAGITIS WITHOUT HEMORRHAGE: ICD-10-CM

## 2024-10-21 RX ORDER — PANTOPRAZOLE SODIUM 40 MG/1
40 TABLET, DELAYED RELEASE ORAL DAILY
Qty: 90 TABLET | Refills: 3 | Status: SHIPPED | OUTPATIENT
Start: 2024-10-21

## 2024-10-21 NOTE — TELEPHONE ENCOUNTER
No care due was identified.  [facility] Embedded Care Due Messages. Reference number: [id number].   10/21/2024 9:14:59 AM CDT

## 2024-10-21 NOTE — TELEPHONE ENCOUNTER
----- Message from Helen sent at 10/21/2024  8:48 AM CDT -----  Contact: Anel  Type:  RX Refill Request    Who Called: Anel  Refill or New Rx:Refill  RX Name and Strength:Pantoprazole (PROTONIX) 40 MG tablet  How is the patient currently taking it? (ex. 1XDay):1x day  Is this a 30 day or 90 day RX:90 day  Preferred Pharmacy with phone number:  [facility] - [place] [address]   [address] [place] [address]  Phone: [phone] Fax: [phone]  Local or Mail Order:Local  Ordering Provider:CLOVIS Christie  Would the patient rather a call back or a response via [facility]? Call back  Best Call Back Number:[phone]  Additional Information:     Thanks  Sl

## 2024-10-23 ENCOUNTER — OFFICE VISIT (OUTPATIENT)
Dept: OTOLARYNGOLOGY | Facility: CLINIC | Age: 59
End: 2024-10-23
Payer: COMMERCIAL

## 2024-10-23 ENCOUNTER — TELEPHONE (OUTPATIENT)
Dept: FAMILY MEDICINE | Facility: CLINIC | Age: 59
End: 2024-10-23
Payer: COMMERCIAL

## 2024-10-23 ENCOUNTER — HOSPITAL ENCOUNTER (OUTPATIENT)
Dept: RADIOLOGY | Facility: HOSPITAL | Age: 59
Discharge: HOME | End: 2024-10-23
Attending: NURSE PRACTITIONER
Payer: COMMERCIAL

## 2024-10-23 VITALS — BODY MASS INDEX: 30.47 KG/M2 | SYSTOLIC BLOOD PRESSURE: 120 MMHG | WEIGHT: 218.5 LBS | DIASTOLIC BLOOD PRESSURE: 69 MMHG

## 2024-10-23 DIAGNOSIS — R51.9 SINUS HEADACHE: ICD-10-CM

## 2024-10-23 DIAGNOSIS — J34.89 SINUS PRESSURE: ICD-10-CM

## 2024-10-23 DIAGNOSIS — J34.89 SINUS PRESSURE: Primary | ICD-10-CM

## 2024-10-23 DIAGNOSIS — J34.89 SINUS PAIN: ICD-10-CM

## 2024-10-23 PROCEDURE — 3008F BODY MASS INDEX DOCD: CPT | Mod: CPTII,S$GLB,, | Performed by: NURSE PRACTITIONER

## 2024-10-23 PROCEDURE — 3074F SYST BP LT 130 MM HG: CPT | Mod: CPTII,S$GLB,, | Performed by: NURSE PRACTITIONER

## 2024-10-23 PROCEDURE — 3044F HG A1C LEVEL LT 7.0%: CPT | Mod: CPTII,S$GLB,, | Performed by: NURSE PRACTITIONER

## 2024-10-23 PROCEDURE — 70220 X-RAY EXAM OF SINUSES: CPT | Mod: TC,FY,PO

## 2024-10-23 PROCEDURE — 99203 OFFICE O/P NEW LOW 30 MIN: CPT | Mod: S$GLB,,, | Performed by: NURSE PRACTITIONER

## 2024-10-23 PROCEDURE — 3078F DIAST BP <80 MM HG: CPT | Mod: CPTII,S$GLB,, | Performed by: NURSE PRACTITIONER

## 2024-10-23 PROCEDURE — 1159F MED LIST DOCD IN RCRD: CPT | Mod: CPTII,S$GLB,, | Performed by: NURSE PRACTITIONER

## 2024-10-23 PROCEDURE — 99999 PR PBB SHADOW E&M-EST. PATIENT-LVL V: CPT | Mod: PBBFAC,,, | Performed by: NURSE PRACTITIONER

## 2024-10-23 PROCEDURE — 70220 X-RAY EXAM OF SINUSES: CPT | Mod: 26,,, | Performed by: RADIOLOGY

## 2024-10-23 PROCEDURE — 3061F NEG MICROALBUMINURIA REV: CPT | Mod: CPTII,S$GLB,, | Performed by: NURSE PRACTITIONER

## 2024-10-23 PROCEDURE — 3066F NEPHROPATHY DOC TX: CPT | Mod: CPTII,S$GLB,, | Performed by: NURSE PRACTITIONER

## 2024-10-23 NOTE — PATIENT INSTRUCTIONS
"Antihistamines:   Cetirizine = Zyrtec  Levocetirizine = Xyzal  Azelastine = Astepro      ENT SINUS REGIMEN:  "ENT-APPROVED" NASAL SPRAYS:  Flonase / fluticasone / Nasacort / Rhinocort (steroid spray) is best for stuffy, pressure, fullness. Available over-the-counter without a prescription.   Astepro / azelastine (antihistamine spray) is best for itchy, drippy, sneezy. Available over-the-counter without a prescription.   Atrovent / ipratropium is best for chronic watery nasal drip ("leaky faucet" nose), which may worsen with eating.    Use as directed, spraying 1-2 times in each nostril each day. It may take 2-3 days to 2-3 weeks to begin seeing improvement. This medication needs to be taken consistently to see results. Overall, this is a well-tolerated medication with low side effects. The benefit of nasal steroids as opposed to oral steroids is that the nasal steroid spray works primarily in the nose. Common side effects can include: headache, nasal dryness, minor nose bleed.  Rare side effects may include:  septal perforation, elevation in eye pressure, dry eyes, change in smell, allergic reaction.  Notify your provider if you have any concerns or experience these symptoms.     Nasal spray instructions:  Blow nose first gently to clean. Keep chin level with the floor (do not tilt head forward or back). Using the opposite hand (example: right hand for left nostril, left hand for right nostril) insert nasal spray taking caution to direct it AWAY from the middle wall inside the nose (septum) to avoid irritating nasal septum which could cause nosebleed.  Do not tilt spray up but rather flat and out along the roof of your mouth to spray. Angle the tip of the spray out slightly toward the direction of the ears; then spray. Do not take quick vigorous sniff but rather slow gentle inhalation while waiting for medication to absorb into nasal passages. Then administer second spray in same way.     Nasal saline rinse kit (use " Neti pot or NeilMed sinus rinse kit) -- Rinse your sinuses once to twice daily to reduce the allergen burden in your nose. Use sterile water (boil tap water for five minutes before allowing to cool) or distilled bottled water. Add 1/4 teaspoon sea salt and a pinch of baking soda or a mixture packet from the maker of your sinus rinse kit.  Rinse through both sides of nose to cleanse sinus and nasal passages, bending forward with head tilted down. Keep your mouth open and breathe through your open mouth without holding your breath. Squeeze bottle gently until solution starts draining from the opposite nasal passage. After bottle is empty, blow nose very gently, without pinching the nose off completely, to avoid exerting pressure on eardrums.  There are useful YouTube videos that show demonstration of how to do these properly.      Ponaris Nasal Emollient is used for the relief of: nasal congestion due to colds, nasal irritation, allergy exacerbations, nasal crusting. Specifically prepared iodized organic oils of pine, eucalyptus, peppermint, cajeput, and cottonseed. To order Ponaris: ask your pharmacist to order it for you or we carry it in our pharmacy downstairs on the first floor.

## 2024-10-23 NOTE — TELEPHONE ENCOUNTER
----- Message from Dhara sent at 10/23/2024 11:22 AM CDT -----  Contact: Anel  Type:  Patient Requesting a call back     Who Called:Anel  What is the call back request regarding?:Requesting a call back in regards to wanting orders put in for her to get an MRI of her head  Would the patient rather a call back or a response via [facility]?call  Best Call Back Number:[phone]   Additional Information:

## 2024-10-23 NOTE — PROGRESS NOTES
Subjective     Patient ID: Anel Schmitt is a 59 y.o. female.    Chief Complaint: Headache, Otalgia, Sinus Problem, Ear Fullness, and Dizziness    HPI  Patient is new to ENT, referred by Dr. Christie for consultation for chronic sinus issues and otalgia; however, patient states her chief concern is dizziness. Completed antibiotic last week for otalgia prescribed by her PCP for otalgia. This was her only antibiotic so far this year.   Patient denies significant allergic stigmata:  No itchy, red, watery eyes; no itchy, red, watery nose; no excessive sneezing or stuffiness. Patient had allergy testing approx 15 years ago in [place] and was told all negative.   Patient denies s/s of acute bacterial sinusitis:  No mucopus from nose or throat, no facial pain, no dental pain, no diminished olfaction/taste, no sore throat or productive cough. (+)Nagging headaches at vertex of head, described as pressure in head, which she attributes to cervical arthritis. (+)Dysequilibrium upon standing or moving too quickly, passes quickly. No vertigo/spinning.    Review of Systems   Constitutional: Negative.  Negative for fever.   HENT:  Positive for facial swelling. Negative for nasal congestion, dental problem, postnasal drip, rhinorrhea, sinus pressure/congestion, sneezing, sore throat, trouble swallowing and voice change.    Eyes: Negative.  Negative for discharge, redness and itching.   Respiratory: Negative.  Negative for cough, choking and shortness of breath.    Cardiovascular: Negative.    Gastrointestinal: Negative.    Musculoskeletal: Negative.    Integumentary:  Negative.   Neurological:  Positive for headaches.   Hematological: Negative.    Psychiatric/Behavioral: Negative.            Objective     Physical Exam  Vitals and nursing note reviewed.   Constitutional:       General: She is not in acute distress.     Appearance: She is well-developed. She is not ill-appearing or diaphoretic.   HENT:      Head: Normocephalic and  atraumatic.      Right Ear: Hearing, tympanic membrane, ear canal and external ear normal. No middle ear effusion. Tympanic membrane is not erythematous.      Left Ear: Hearing, tympanic membrane, ear canal and external ear normal.  No middle ear effusion. Tympanic membrane is not erythematous.      Nose: Nasal deformity (spur right) present. No mucosal edema, congestion or rhinorrhea.      Right Nostril: No occlusion.      Left Nostril: No occlusion.      Right Sinus: Maxillary sinus tenderness (8/10) present. No frontal sinus tenderness.      Left Sinus: Maxillary sinus tenderness (8/10) present. No frontal sinus tenderness.      Mouth/Throat:      Mouth: Mucous membranes are not pale, not dry and not cyanotic. No oral lesions.      Dentition: Has dentures.      Tongue: No lesions.      Palate: No lesions.      Pharynx: Uvula midline. No oropharyngeal exudate or posterior oropharyngeal erythema.      Tonsils: 2+ on the right. 2+ on the left.   Eyes:      General: Lids are normal. No scleral icterus.        Right eye: No discharge.         Left eye: No discharge.   Neck:      Thyroid: No thyroid mass or thyromegaly.      Trachea: Trachea normal. No tracheal deviation.   Cardiovascular:      Rate and Rhythm: Normal rate.   Pulmonary:      Effort: Pulmonary effort is normal. No respiratory distress.      Breath sounds: Normal air entry.   Musculoskeletal:         General: Normal range of motion.      Cervical back: Normal range of motion and neck supple.   Lymphadenopathy:      Head:      Right side of head: No submental, submandibular, tonsillar, preauricular or posterior auricular adenopathy.      Left side of head: No submental, submandibular, tonsillar, preauricular or posterior auricular adenopathy.      Cervical: No cervical adenopathy.      Right cervical: No superficial or posterior cervical adenopathy.     Left cervical: No superficial or posterior cervical adenopathy.   Skin:     General: Skin is warm and  dry.      Coloration: Skin is not pale.      Findings: No lesion or rash.   Neurological:      Mental Status: She is alert and oriented to person, place, and time.      Motor: Motor function is intact.      Coordination: Coordination is intact.      Gait: Gait normal.   Psychiatric:         Attention and Perception: Attention normal.         Mood and Affect: Mood normal.         Speech: Speech normal.         Behavior: Behavior normal. Behavior is cooperative.            Assessment and Plan     1. Sinus pressure  -     X-Ray Sinuses Min 3 Views; Future; Expected date: 10/23/2024    2. Sinus headache  -     X-Ray Sinuses Min 3 Views; Future; Expected date: 10/23/2024    3. Sinus pain      X-rays today to rule out sinusitis etiology for headaches. Reassured negative aural exam; no evidence of any OM or OE to explain otalgia. Otalgia is therefore referred secondary to likely musculoskeletal etiology. (Imaging today will show mastoids as well.)  Sinus regimen given and discussed: [employer] Med sinus rinse kit given for daily use, steroid nasal spray recommended, antihistamine nasal spray recommended.   If imaging is negative for sinusitis etiology today, patient is encouraged to return to PCP to discuss headaches/pressure and cervical arthritis concerns.   Patient encouraged to return to clinic if symptoms worsen/persist and as needed for further ENT symptoms or concerns.          No follow-ups on file.

## 2024-10-24 DIAGNOSIS — N95.1 MENOPAUSAL VAGINAL DRYNESS: ICD-10-CM

## 2024-10-24 RX ORDER — ESTRADIOL 0.1 MG/G
CREAM VAGINAL
Qty: 42.5 G | Refills: 11 | Status: SHIPPED | OUTPATIENT
Start: 2024-10-24

## 2024-10-28 ENCOUNTER — OFFICE VISIT (OUTPATIENT)
Dept: FAMILY MEDICINE | Facility: CLINIC | Age: 59
End: 2024-10-28
Payer: COMMERCIAL

## 2024-10-28 ENCOUNTER — TELEPHONE (OUTPATIENT)
Dept: FAMILY MEDICINE | Facility: CLINIC | Age: 59
End: 2024-10-28
Payer: COMMERCIAL

## 2024-10-28 ENCOUNTER — LAB VISIT (OUTPATIENT)
Dept: LAB | Facility: HOSPITAL | Age: 59
End: 2024-10-28
Attending: STUDENT IN AN ORGANIZED HEALTH CARE EDUCATION/TRAINING PROGRAM
Payer: COMMERCIAL

## 2024-10-28 VITALS
BODY MASS INDEX: 30.8 KG/M2 | TEMPERATURE: 97 F | WEIGHT: 220.81 LBS | HEART RATE: 68 BPM | DIASTOLIC BLOOD PRESSURE: 81 MMHG | RESPIRATION RATE: 18 BRPM | OXYGEN SATURATION: 97 % | SYSTOLIC BLOOD PRESSURE: 134 MMHG

## 2024-10-28 DIAGNOSIS — R20.2 PARESTHESIA: ICD-10-CM

## 2024-10-28 DIAGNOSIS — E88.810 METABOLIC SYNDROME: ICD-10-CM

## 2024-10-28 DIAGNOSIS — Z90.3 H/O GASTRIC SLEEVE: ICD-10-CM

## 2024-10-28 DIAGNOSIS — M54.2 CERVICALGIA: Primary | ICD-10-CM

## 2024-10-28 DIAGNOSIS — E03.9 HYPOTHYROIDISM, UNSPECIFIED TYPE: ICD-10-CM

## 2024-10-28 DIAGNOSIS — I10 ESSENTIAL HYPERTENSION: ICD-10-CM

## 2024-10-28 LAB
ALBUMIN SERPL BCP-MCNC: 4.3 G/DL (ref 3.5–5.2)
ALP SERPL-CCNC: 72 U/L (ref 40–150)
ALT SERPL W/O P-5'-P-CCNC: 42 U/L (ref 10–44)
ANION GAP SERPL CALC-SCNC: 11 MMOL/L (ref 8–16)
AST SERPL-CCNC: 22 U/L (ref 10–40)
BILIRUB SERPL-MCNC: 0.3 MG/DL (ref 0.1–1)
BUN SERPL-MCNC: 19 MG/DL (ref 6–20)
CALCIUM SERPL-MCNC: 9.7 MG/DL (ref 8.7–10.5)
CHLORIDE SERPL-SCNC: 102 MMOL/L (ref 95–110)
CO2 SERPL-SCNC: 26 MMOL/L (ref 23–29)
CREAT SERPL-MCNC: 0.8 MG/DL (ref 0.5–1.4)
EST. GFR  (NO RACE VARIABLE): >60 ML/MIN/1.73 M^2
GLUCOSE SERPL-MCNC: 89 MG/DL (ref 70–110)
POTASSIUM SERPL-SCNC: 3.8 MMOL/L (ref 3.5–5.1)
PROT SERPL-MCNC: 7.5 G/DL (ref 6–8.4)
SODIUM SERPL-SCNC: 139 MMOL/L (ref 136–145)

## 2024-10-28 PROCEDURE — 3044F HG A1C LEVEL LT 7.0%: CPT | Mod: CPTII,S$GLB,, | Performed by: STUDENT IN AN ORGANIZED HEALTH CARE EDUCATION/TRAINING PROGRAM

## 2024-10-28 PROCEDURE — 99214 OFFICE O/P EST MOD 30 MIN: CPT | Mod: S$GLB,,, | Performed by: STUDENT IN AN ORGANIZED HEALTH CARE EDUCATION/TRAINING PROGRAM

## 2024-10-28 PROCEDURE — 1159F MED LIST DOCD IN RCRD: CPT | Mod: CPTII,S$GLB,, | Performed by: STUDENT IN AN ORGANIZED HEALTH CARE EDUCATION/TRAINING PROGRAM

## 2024-10-28 PROCEDURE — 3075F SYST BP GE 130 - 139MM HG: CPT | Mod: CPTII,S$GLB,, | Performed by: STUDENT IN AN ORGANIZED HEALTH CARE EDUCATION/TRAINING PROGRAM

## 2024-10-28 PROCEDURE — 84425 ASSAY OF VITAMIN B-1: CPT | Performed by: STUDENT IN AN ORGANIZED HEALTH CARE EDUCATION/TRAINING PROGRAM

## 2024-10-28 PROCEDURE — 82746 ASSAY OF FOLIC ACID SERUM: CPT | Performed by: STUDENT IN AN ORGANIZED HEALTH CARE EDUCATION/TRAINING PROGRAM

## 2024-10-28 PROCEDURE — 99999 PR PBB SHADOW E&M-EST. PATIENT-LVL V: CPT | Mod: PBBFAC,,, | Performed by: STUDENT IN AN ORGANIZED HEALTH CARE EDUCATION/TRAINING PROGRAM

## 2024-10-28 PROCEDURE — G2211 COMPLEX E/M VISIT ADD ON: HCPCS | Mod: S$GLB,,, | Performed by: STUDENT IN AN ORGANIZED HEALTH CARE EDUCATION/TRAINING PROGRAM

## 2024-10-28 PROCEDURE — 84439 ASSAY OF FREE THYROXINE: CPT | Performed by: STUDENT IN AN ORGANIZED HEALTH CARE EDUCATION/TRAINING PROGRAM

## 2024-10-28 PROCEDURE — 1160F RVW MEDS BY RX/DR IN RCRD: CPT | Mod: CPTII,S$GLB,, | Performed by: STUDENT IN AN ORGANIZED HEALTH CARE EDUCATION/TRAINING PROGRAM

## 2024-10-28 PROCEDURE — 84443 ASSAY THYROID STIM HORMONE: CPT | Performed by: STUDENT IN AN ORGANIZED HEALTH CARE EDUCATION/TRAINING PROGRAM

## 2024-10-28 PROCEDURE — 82306 VITAMIN D 25 HYDROXY: CPT | Performed by: STUDENT IN AN ORGANIZED HEALTH CARE EDUCATION/TRAINING PROGRAM

## 2024-10-28 PROCEDURE — 84252 ASSAY OF VITAMIN B-2: CPT | Performed by: STUDENT IN AN ORGANIZED HEALTH CARE EDUCATION/TRAINING PROGRAM

## 2024-10-28 PROCEDURE — 3066F NEPHROPATHY DOC TX: CPT | Mod: CPTII,S$GLB,, | Performed by: STUDENT IN AN ORGANIZED HEALTH CARE EDUCATION/TRAINING PROGRAM

## 2024-10-28 PROCEDURE — 80053 COMPREHEN METABOLIC PANEL: CPT | Performed by: STUDENT IN AN ORGANIZED HEALTH CARE EDUCATION/TRAINING PROGRAM

## 2024-10-28 PROCEDURE — 84591 ASSAY OF NOS VITAMIN: CPT | Performed by: STUDENT IN AN ORGANIZED HEALTH CARE EDUCATION/TRAINING PROGRAM

## 2024-10-28 PROCEDURE — 3079F DIAST BP 80-89 MM HG: CPT | Mod: CPTII,S$GLB,, | Performed by: STUDENT IN AN ORGANIZED HEALTH CARE EDUCATION/TRAINING PROGRAM

## 2024-10-28 PROCEDURE — 36415 COLL VENOUS BLD VENIPUNCTURE: CPT | Mod: PO | Performed by: STUDENT IN AN ORGANIZED HEALTH CARE EDUCATION/TRAINING PROGRAM

## 2024-10-28 PROCEDURE — 3061F NEG MICROALBUMINURIA REV: CPT | Mod: CPTII,S$GLB,, | Performed by: STUDENT IN AN ORGANIZED HEALTH CARE EDUCATION/TRAINING PROGRAM

## 2024-10-28 PROCEDURE — 3008F BODY MASS INDEX DOCD: CPT | Mod: CPTII,S$GLB,, | Performed by: STUDENT IN AN ORGANIZED HEALTH CARE EDUCATION/TRAINING PROGRAM

## 2024-10-28 RX ORDER — AMLODIPINE BESYLATE 2.5 MG/1
2.5 TABLET ORAL DAILY
Qty: 90 TABLET | Refills: 3 | Status: SHIPPED | OUTPATIENT
Start: 2024-10-28

## 2024-10-28 RX ORDER — SEMAGLUTIDE 1.7 MG/.75ML
1.7 INJECTION, SOLUTION SUBCUTANEOUS
Qty: 1.5 ML | Refills: 6 | Status: SHIPPED | OUTPATIENT
Start: 2024-10-28

## 2024-10-28 RX ORDER — GABAPENTIN 300 MG/1
300 CAPSULE ORAL NIGHTLY
Qty: 30 CAPSULE | Refills: 2 | Status: SHIPPED | OUTPATIENT
Start: 2024-10-28 | End: 2024-10-29

## 2024-10-28 RX ORDER — SEMAGLUTIDE 1.7 MG/.75ML
1.7 INJECTION, SOLUTION SUBCUTANEOUS
Qty: 3 ML | Refills: 11 | Status: SHIPPED | OUTPATIENT
Start: 2024-10-28 | End: 2024-10-28

## 2024-10-29 ENCOUNTER — TELEPHONE (OUTPATIENT)
Dept: FAMILY MEDICINE | Facility: CLINIC | Age: 59
End: 2024-10-29
Payer: COMMERCIAL

## 2024-10-29 DIAGNOSIS — R20.2 PARESTHESIA: ICD-10-CM

## 2024-10-29 DIAGNOSIS — M54.2 CERVICALGIA: ICD-10-CM

## 2024-10-29 LAB
25(OH)D3+25(OH)D2 SERPL-MCNC: 23 NG/ML (ref 30–96)
FOLATE SERPL-MCNC: 7.3 NG/ML (ref 4–24)
T4 FREE SERPL-MCNC: 0.95 NG/DL (ref 0.71–1.51)
TSH SERPL DL<=0.005 MIU/L-ACNC: 0.25 UIU/ML (ref 0.4–4)

## 2024-10-29 RX ORDER — GABAPENTIN 100 MG/1
100 CAPSULE ORAL 2 TIMES DAILY
Qty: 60 CAPSULE | Refills: 3 | Status: SHIPPED | OUTPATIENT
Start: 2024-10-29

## 2024-10-30 ENCOUNTER — TELEPHONE (OUTPATIENT)
Dept: FAMILY MEDICINE | Facility: CLINIC | Age: 59
End: 2024-10-30
Payer: COMMERCIAL

## 2024-10-31 ENCOUNTER — TELEPHONE (OUTPATIENT)
Dept: FAMILY MEDICINE | Facility: CLINIC | Age: 59
End: 2024-10-31
Payer: COMMERCIAL

## 2024-10-31 LAB — VIT B2 SERPL-MCNC: 4 MCG/L (ref 1–19)

## 2024-11-01 ENCOUNTER — TELEPHONE (OUTPATIENT)
Dept: FAMILY MEDICINE | Facility: CLINIC | Age: 59
End: 2024-11-01
Payer: COMMERCIAL

## 2024-11-01 LAB
NIACIN SERPL-MCNC: <5 NG/ML
NICOTINAMIDE SERPL-MCNC: 34.3 NG/ML (ref 5–48)
NICOTINURATE SERPL-MCNC: <5 NG/ML

## 2024-11-04 ENCOUNTER — TELEPHONE (OUTPATIENT)
Dept: PRIMARY CARE CLINIC | Facility: CLINIC | Age: 59
End: 2024-11-04
Payer: COMMERCIAL

## 2024-11-04 NOTE — TELEPHONE ENCOUNTER
----- Message from Gisel sent at 11/4/2024  9:58 AM CST -----  Name of Who is Calling:KIRBY STOCKTON [[id number]]        What is the request in detail: pt is calling about her medication  Vitamin  D and something else she wants to speaks nurse please advise thank you         Can the clinic reply by [facility]:call back        What Number to Call Back [facility]: Telephone Information:  Mobile          [phone]

## 2024-11-04 NOTE — TELEPHONE ENCOUNTER
I spoke with the patient about this. Pt re informed of results. Please see result note for additional documentation.

## 2024-11-06 ENCOUNTER — TELEPHONE (OUTPATIENT)
Dept: FAMILY MEDICINE | Facility: CLINIC | Age: 59
End: 2024-11-06
Payer: COMMERCIAL

## 2024-11-06 NOTE — TELEPHONE ENCOUNTER
----- Message from Ashia sent at 11/6/2024  1:25 PM CST -----  Contact: [facility]  ..Type:  Patient Requesting Call    Who Called: [facility]  Does the patient know what this is regarding?: received orders for MRI, needing auth   Would the patient rather a call back or a response via [facility]?  Call   Best Call Back Number: [phone] --> fax [phone]   Additional Information:  pt is scheduled for 11/11/24

## 2024-11-06 NOTE — TELEPHONE ENCOUNTER
You routed this conversation to Pre-Service  Pre Service Intake  Waqas Olivas    10/31/24 10:35 AM  Note  Pt is scheduled to complete MRI Carlos and MRI Cervical at [facility]. Please assist with authorization

## 2024-11-12 ENCOUNTER — TELEPHONE (OUTPATIENT)
Dept: FAMILY MEDICINE | Facility: CLINIC | Age: 59
End: 2024-11-12
Payer: COMMERCIAL

## 2024-11-12 DIAGNOSIS — R20.2 PARESTHESIA: ICD-10-CM

## 2024-11-12 DIAGNOSIS — R90.89 ABNORMAL FINDING ON MRI OF BRAIN: Primary | ICD-10-CM

## 2024-11-12 DIAGNOSIS — M54.2 CERVICALGIA: ICD-10-CM

## 2024-11-12 NOTE — TELEPHONE ENCOUNTER
----- Message from Katelyn sent at 11/12/2024 10:33 AM CST -----  Contact: Patient, [phone]  2TESTRESULTS    Type: Test Results    What test was performed? MRI    Who ordered the test? Dr Christie    When and where were the test performed? [facility] 11/11/2024    Would you like a call back and or thru [facility]: Call back    Comments: The results were faxed yesterday. Please call her. Thanks.

## 2024-11-12 NOTE — TELEPHONE ENCOUNTER
Pt requesting MRI results. Testing done external and paper reports has been placed on MD's desk. Please advise

## 2024-11-12 NOTE — TELEPHONE ENCOUNTER
----- Message from Dory sent at 11/12/2024  3:02 PM CST -----  Contact: Anel  .Type:  Patient Returning Call    Who Called:Anel  Who Left Message for Patient:Vahid More LPN  Does the patient know what this is regarding?:a call she received on today she stated that she need to speak with her about more information she have to ask her   Would the patient rather a call back or a response via [facility]? Call back  Best Call Back Number:[phone]  Additional Information:

## 2024-11-12 NOTE — TELEPHONE ENCOUNTER
Spoke with pt who verbalized understanding of results. Please contact pt to schedule Neuro referral when able, thank you.

## 2024-11-12 NOTE — TELEPHONE ENCOUNTER
----- Message from Bertin sent at 11/12/2024  8:38 AM CST -----  Contact: self   .Type: Patient Call Back        Who called:      Patient   What is the request in detail:    Called in concerning mri results . Please call back   Can the clinic reply by [facility]?           Would the patient rather a call back or a response via [facility]?      call   Best call back number:  [phone]

## 2024-11-12 NOTE — TELEPHONE ENCOUNTER
----- Message from Leticia sent at 11/12/2024  1:22 PM CST -----  Type:  Patient Returning Call    Who Called:  Who Left Message for Patient:Vahid More LPN    Does the patient know what this is regarding?:results  Would the patient rather a call back or a response via [facility]? call  Best Call Back Number:[phone]    Additional Information: return

## 2024-11-12 NOTE — TELEPHONE ENCOUNTER
Spoke with pt who stated she is awaiting MRI results of her brain, advised her we have not received the results faxed over too us at this time. As soon as they are we will give pt a call back to discuss. Pt verbalized understanding and ended call.

## 2024-11-12 NOTE — TELEPHONE ENCOUNTER
1st avail neurology    MRI brain 11/11/24    No acute process. Does show mild/moderate subcortical white matter foci of increased FLAIR signal hyperintensities in the parietal (sides of brain) occipital (back of brain) lobes.  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  Possibly caused by years of hypertension; although, well-controlled.    I have sent a referral to the neurologist to help us out.  My team will get this scheduled.      Orders Placed This Encounter   Procedures    Ambulatory referral/consult to Neurology     Standing Status:   Future     Standing Expiration Date:   12/12/2025     Referral Priority:   Routine     Referral Type:   Consultation     Referral Reason:   Specialty Services Required     Requested Specialty:   Neurology     Number of Visits Requested:   1

## 2024-11-15 ENCOUNTER — LAB VISIT (OUTPATIENT)
Dept: LAB | Facility: HOSPITAL | Age: 59
End: 2024-11-15
Attending: DIETITIAN, REGISTERED
Payer: COMMERCIAL

## 2024-11-15 ENCOUNTER — OFFICE VISIT (OUTPATIENT)
Dept: FAMILY MEDICINE | Facility: CLINIC | Age: 59
End: 2024-11-15
Payer: COMMERCIAL

## 2024-11-15 VITALS
BODY MASS INDEX: 31.52 KG/M2 | SYSTOLIC BLOOD PRESSURE: 138 MMHG | OXYGEN SATURATION: 96 % | HEIGHT: 69 IN | TEMPERATURE: 98 F | DIASTOLIC BLOOD PRESSURE: 86 MMHG | RESPIRATION RATE: 18 BRPM | WEIGHT: 212.81 LBS | HEART RATE: 75 BPM

## 2024-11-15 DIAGNOSIS — R20.2 PARESTHESIA: Primary | ICD-10-CM

## 2024-11-15 DIAGNOSIS — R79.89 LOW TSH LEVEL: ICD-10-CM

## 2024-11-15 LAB
T3FREE SERPL-MCNC: 3 PG/ML (ref 2.3–4.2)
T4 FREE SERPL-MCNC: 1.4 NG/DL (ref 0.71–1.51)
TSH SERPL DL<=0.005 MIU/L-ACNC: 0.42 UIU/ML (ref 0.4–4)

## 2024-11-15 PROCEDURE — 99999 PR PBB SHADOW E&M-EST. PATIENT-LVL V: CPT | Mod: PBBFAC,,, | Performed by: DIETITIAN, REGISTERED

## 2024-11-15 PROCEDURE — 84443 ASSAY THYROID STIM HORMONE: CPT | Performed by: DIETITIAN, REGISTERED

## 2024-11-15 PROCEDURE — 84481 FREE ASSAY (FT-3): CPT | Performed by: DIETITIAN, REGISTERED

## 2024-11-15 PROCEDURE — 84439 ASSAY OF FREE THYROXINE: CPT | Performed by: DIETITIAN, REGISTERED

## 2024-11-15 PROCEDURE — 36415 COLL VENOUS BLD VENIPUNCTURE: CPT | Mod: PO | Performed by: DIETITIAN, REGISTERED

## 2024-11-15 RX ORDER — PREGABALIN 150 MG/1
150 CAPSULE ORAL 2 TIMES DAILY
Qty: 60 CAPSULE | Refills: 6 | Status: SHIPPED | OUTPATIENT
Start: 2024-11-15

## 2024-11-15 NOTE — PROGRESS NOTES
PLAN:    Assessment & Plan    Reviewed brain MRI results, showing mild to moderate subcortical white matter foci, likely due to hypertension or migraines rather than calcium buildup  Changed from gabapentin to Lyrica for better tolerability and potential efficacy in managing burning sensation  Recommend patient obtain cervical spine MRI already ordered to further evaluate symptoms and rule out other neurological conditions    PARESTHESIA:  Started Lyrica, twice daily.  Trial on a non-driving day to assess tolerability.  Discontinued Gabapentin.  Contact the office if Lyrica causes intolerable side effects or is ineffective.    CERVICALGIA AND MUSCLE DISCOMFORT:  Continued Robaxin (muscle relaxer) for discomfort.    GASTROINTESTINAL ISSUES:  Continued Protonix for stomach discomfort.    THYROID FUNCTION:  Noted incidental low TSH on previous labs.  Ordered thyroid function tests: TSH, T3, T4.    VITAMIN B12 DEFICIENCY:  Ordered B12 level.    FOLLOW-UP:  Follow up on December 16th to assess efficacy of Lyrica.  Follow up in early February after neurology consultation.  Patient to obtain MRI disc from [facility] for upcoming neurology appointment.        Problem List Items Addressed This Visit       Paresthesia - Primary    Relevant Medications    pregabalin (LYRICA) 150 MG capsule    Other Relevant Orders    VITAMIN B12     Other Visit Diagnoses       Low TSH level        Relevant Orders    TSH    T3, FREE    T4, FREE          Future Appointments       Date Provider Specialty Appt Notes    11/15/2024  Lab [facility]    12/4/2024 Irene Christie MD Family Medicine 4 week follow up per     12/27/2024  Lab     12/30/2024 Stella Knutson DO Family Medicine 1 mon f/u    1/28/2025 Maribeth Velazquez, FNP Neurology Abnormal finding on MRI of brain [R90.89]  Paresthesia [R20.2]  Cervicalgia [M54.2]           Medication Management for assessment above:   Medication List with Changes/Refills   New Medications    PREGABALIN  (LYRICA) 150 MG CAPSULE    Take 1 capsule (150 mg total) by mouth 2 (two) times daily.   Current Medications    AMLODIPINE (NORVASC) 2.5 MG TABLET    Take 1 tablet (2.5 mg total) by mouth once daily.    AZELASTINE (ASTELIN) 137 MCG (0.1 %) NASAL SPRAY    1 spray (137 mcg total) by Nasal route 2 (two) times daily.    CLOTRIMAZOLE-BETAMETHASONE 1-0.05% (LOTRISONE) CREAM    Apply topically 2 (two) times daily.    DICLOFENAC SODIUM (VOLTAREN) 1 % GEL    Apply 2 g topically 2 (two) times daily as needed (for arthritis pain).    ESTRADIOL (ESTRACE) 0.01 % (0.1 MG/GRAM) VAGINAL CREAM    APPLY 3 GRAMS  VAGINALLY TWICE A WEEK *THANK YOU*    FLUCONAZOLE (DIFLUCAN) 150 MG TAB    Take 1 tab by mouth on day 1; if symptoms persist after 72 hours, take 2nd tab    FLUOROMETHOLONE 0.1% (FML) 0.1 % DRPS    Place into both eyes.    HYDROCHLOROTHIAZIDE (HYDRODIURIL) 12.5 MG TAB    Take 1 tablet (12.5 mg total) by mouth once daily.    LEVALBUTEROL (XOPENEX) 1.25 MG/3 ML NEBULIZER SOLUTION    Take 3 mLs (1.25 mg total) by nebulization every 6 (six) hours as needed for Wheezing. Rescue    LEVOCETIRIZINE (XYZAL) 5 MG TABLET    Take 1 tablet (5 mg total) by mouth every evening.    LIDOCAINE-PRILOCAINE (EMLA) CREAM    Apply topically as needed (leg pain).    LINACLOTIDE (LINZESS) 145 MCG CAP CAPSULE    Take 1 capsule (145 mcg total) by mouth before breakfast.    NYSTATIN (MYCOSTATIN) POWDER    Apply topically 2 (two) times daily.    OXYBUTYNIN (DITROPAN-XL) 5 MG TR24    Take 1 tablet (5 mg total) by mouth once daily.    PANTOPRAZOLE (PROTONIX) 40 MG TABLET    Take 1 tablet (40 mg total) by mouth once daily.    SEMAGLUTIDE, WEIGHT LOSS, (WEGOVY) 1.7 MG/0.75 ML PNIJ    Inject 1.7 mg into the skin every 7 days.    SIMETHICONE (MYLICON) 125 MG CAP CAPSULE    Take 1 capsule (125 mg total) by mouth 4 (four) times daily as needed for Flatulence.    SUMATRIPTAN (IMITREX) 50 MG TABLET    Take 1 tablet (50 mg total) by mouth once. for 1 dose     TOBRAMYCIN-DEXAMETHASONE 0.3-0.1% (TOBRADEX) 0.3-0.1 % DRPS    Place into both eyes.    TRIAMCINOLONE ACETONIDE 0.1% (KENALOG) 0.1 % CREAM    Apply topically 2 (two) times daily.   Discontinued Medications    GABAPENTIN (NEURONTIN) 100 MG CAPSULE    Take 1 capsule (100 mg total) by mouth 2 (two) times daily.    ROSUVASTATIN (CRESTOR) 20 MG TABLET    Take 1 tablet (20 mg total) by mouth once daily.       Stella Knutson, DO, RDN  ==========================================================================  Subjective:   Patient ID: Anel Schmitt is a 59 y.o. female.  has a past medical history of Cancer, GERD (gastroesophageal reflux disease), and Hypertension.   Chief Complaint: Results (Discuss results)      History of Present Illness    CHIEF COMPLAINT:  Patient presents with concerns about calcium buildup in the brain and a burning sensation in the head, seeking clarification on MRI results and treatment options.    HPI:  Patient reports a burning sensation in her head that migrates to different areas including her ear, forehead, and neck. This sensation began after initiating Astacar for cholesterol management and is accompanied by a migratory tingling sensation. The burning sensation is distressing and affects various areas of her head.    A brain MRI performed 2 weeks ago reportedly showed calcium buildup, causing confusion and anxiety. Recent labs revealed low thyroid and vitamin D levels. Vitamin D supplementation exacerbates the burning sensation.    Patient reports difficulty sleeping and notes that gabapentin 100mg causes nausea, while 200mg impairs her ability to drive. She expresses concern about her ability to continue working as a .    There is a history of migraines previously treated with Imitrex and gastric sleeve surgery for weight loss. An appointment with a neurologist is scheduled for the end of January, causing anxiety due to the wait time and potential severity of her  condition.    Patient denies seizures, muscle weakness, dropping objects, falling, or any unusual neurological symptoms. She also denies having multiple sclerosis (MS).    MEDICATIONS:  Patient is on Protonix in the morning for stomach issues and Crestor for cholesterol management. She is taking Gabapentin 100 mg for a burning sensation, which causes sleepiness. She is also on some blood pressure medication. Patient is taking a Vitamin D supplement, which causes constipation and worsens the burning sensation.    MEDICAL HISTORY:  Patient has a history of hypertension, hyperlipidemia, migraines, gastric issues, thyroid dysfunction, and Vitamin D deficiency.    SURGICAL HISTORY:  Patient underwent a gastric sleeve procedure for weight loss, though the date of the surgery was not specified.    TEST RESULTS:  Patient's past lab results show low TSH and Vitamin D levels. Her thyroid was slightly low on labs. B2 (Riboflavin), Niacin, Folate, A1c, Cholesterol, and Thiamin were within normal limits. Creatinine was elevated, while platelets and RBCs were not elevated.    IMAGING:  A past MRI of the brain showed mild to moderate subcortical white matter foci, described as small areas of ischemic changes due to hypertension. An X-ray of the sinuses was mentioned, but no results were provided.    SOCIAL HISTORY:  Patient works as a . She is currently serving as a soldier in the .      ROS:  General: -fever, -chills, +fatigue, -weight gain, -weight loss  Eyes: -vision changes, -redness, -discharge  ENT: -ear pain, -nasal congestion, -sore throat  Cardiovascular: -chest pain, -palpitations, -lower extremity edema  Respiratory: -cough, -shortness of breath  Gastrointestinal: -abdominal pain, -nausea, -vomiting, -diarrhea, +constipation, -blood in stool, +change in bowel habits  Genitourinary: -dysuria, -hematuria, -frequency  Musculoskeletal: -joint pain, -muscle pain, -muscle weakness  Skin: -rash,  "-lesion  Neurological: -headache, +headache, -dizziness, -numbness, +tingling  Psychiatric: +anxiety, -depression, +sleep difficulty          Problem List Items Addressed This Visit       Paresthesia - Primary     Other Visit Diagnoses       Low TSH level                 Review of patient's allergies indicates:   Allergen Reactions    Codeine     Duloxetine Other (See Comments)     Pt stated cymbalta made her feel "crazy" in head. Pt stated she is unable to describe it any other way.    Flonase [fluticasone] Other (See Comments)     Headache     Penicillins      Current Outpatient Medications   Medication Instructions    amLODIPine (NORVASC) 2.5 mg, Oral, Daily    azelastine (ASTELIN) 137 mcg, Nasal, 2 times daily    clotrimazole-betamethasone 1-0.05% (LOTRISONE) cream Topical (Top), 2 times daily    diclofenac sodium (VOLTAREN) 2 g, Topical (Top), 2 times daily PRN    estradioL (ESTRACE) 0.01 % (0.1 mg/gram) vaginal cream APPLY 3 GRAMS  VAGINALLY TWICE A WEEK *THANK YOU*    fluconazole (DIFLUCAN) 150 MG Tab Take 1 tab by mouth on day 1; if symptoms persist after 72 hours, take 2nd tab    fluorometholone 0.1% (FML) 0.1 % DrpS Place into both eyes.    hydroCHLOROthiazide (HYDRODIURIL) 12.5 mg, Oral, Daily    levalbuterol (XOPENEX) 1.25 mg, Nebulization, Every 6 hours PRN, Rescue    levocetirizine (XYZAL) 5 mg, Oral, Nightly    LIDOcaine-prilocaine (EMLA) cream Topical (Top), As needed (PRN)    linaCLOtide (LINZESS) 145 mcg, Oral, Before breakfast    nystatin (MYCOSTATIN) powder Topical (Top), 2 times daily    oxybutynin (DITROPAN-XL) 5 mg, Oral, Daily    pantoprazole (PROTONIX) 40 mg, Oral, Daily    pregabalin (LYRICA) 150 mg, Oral, 2 times daily    simethicone (MYLICON) 125 mg, Oral, 4 times daily PRN    sumatriptan (IMITREX) 50 mg, Oral, Once    tobramycin-dexAMETHasone 0.3-0.1% (TOBRADEX) 0.3-0.1 % DrpS Place into both eyes.    triamcinolone acetonide 0.1% (KENALOG) 0.1 % cream Topical (Top), 2 times daily    " "WEGOVY 1.7 mg, Subcutaneous, Every 7 days      I have reviewed the PMH, social history, FamilyHx, surgical history, allergies and medications documented / confirmed by the patient at the time of this visit.    Objective:   /86   Pulse 75   Temp 97.6 °F (36.4 °C)   Resp 18   Ht 5' 9" (1.753 m)   Wt 96.5 kg (212 lb 12.8 oz)   SpO2 96%   BMI 31.43 kg/m²   Physical Exam    General: No acute distress. Well-developed. Well-nourished.  Eyes: EOMI. Sclerae anicteric.  HENT: Normocephalic. Atraumatic. Nares patent. Moist oral mucosa.  Ears: Bilateral TMs clear. Bilateral EACs clear.  Cardiovascular: Regular rate. Regular rhythm. No murmurs. No rubs. No gallops. Normal S1, S2.  Respiratory: Normal respiratory effort. Clear to auscultation bilaterally. No rales. No rhonchi. No wheezing.  Abdomen: Soft. Non-tender. Non-distended. Normoactive bowel sounds.  Musculoskeletal: No  obvious deformity.  Extremities: No lower extremity edema.  Neurological: Alert & oriented x3. No slurred speech. Normal gait.  Psychiatric: Normal mood. Normal affect. Good insight. Good judgment.  Skin: Warm. Dry. No rash.          Assessment:     1. Paresthesia    2. Low TSH level      MDM:   Moderate complexity, exacerbation of chronic condition requiring unique testing and medication management.   Visit today included increased complexity associated with the care of the episodic problem see above assessment addressed and managing the longitudinal care of the patient due to the serious and/or complex managed problem(s) see above.    I have reviewed and summarized old records: PCP's records, MRI results from other provider.  I have performed thorough medication reconciliation today and discussed risk and benefits of medications.  I have reviewed labs and discussed with patient.  All questions were answered.    I have signed for the following orders AND/OR meds.  Orders Placed This Encounter   Procedures    TSH     Standing Status:   Future "     Number of Occurrences:   1     Standing Expiration Date:   2/13/2026     Order Specific Question:   Send normal result to authorizing provider's In Basket if patient is active on MyChart:     Answer:   Yes    T3, FREE     Standing Status:   Future     Number of Occurrences:   1     Standing Expiration Date:   2/13/2026     Order Specific Question:   Send normal result to authorizing provider's In Basket if patient is active on MyChart:     Answer:   Yes    T4, FREE     Standing Status:   Future     Number of Occurrences:   1     Standing Expiration Date:   2/13/2026     Order Specific Question:   Send normal result to authorizing provider's In Basket if patient is active on MyChart:     Answer:   Yes    VITAMIN B12     Standing Status:   Future     Standing Expiration Date:   2/13/2026     Order Specific Question:   Send normal result to authorizing provider's In Basket if patient is active on MyChart:     Answer:   Yes     Medications Ordered This Encounter   Medications    pregabalin (LYRICA) 150 MG capsule     Sig: Take 1 capsule (150 mg total) by mouth 2 (two) times daily.     Dispense:  60 capsule     Refill:  6        Follow up in about 31 days (around 12/16/2024).  Future Appointments       Date Provider Specialty Appt Notes    11/15/2024  Lab [facility]    12/4/2024 Irene Christie MD Family Medicine 4 week follow up per     12/27/2024  Lab     12/30/2024 Stella Knutson DO Family Medicine 1 mon f/u    1/28/2025 Maribeth Velazquez, P Neurology Abnormal finding on MRI of brain [R90.89]  Paresthesia [R20.2]  Cervicalgia [M54.2]            If no improvement in symptoms or symptoms worsen, advised to call/follow-up at clinic or go to ER. Patient voiced understanding and all questions/concerns were addressed.     DISCLAIMER: This note was compiled by using a speech recognition dictation system and therefore please be aware that typographical / speech recognition errors can and do occur.  Please contact me  if you see any errors specifically.     This note was generated with the assistance of ambient listening technology. Verbal consent was obtained by the patient and accompanying visitor(s) for the recording of patient appointment to facilitate this note. I attest to having reviewed and edited the generated note for accuracy, though some syntax or spelling errors may persist. Please contact the author of this note for any clarification.      Stella Knutson DO, RACHNAN    Office: [phone] [id number] [place] [address]  FAX: [phone]

## 2024-11-15 NOTE — Clinical Note
Do we have an open MRI in [place]? She has a cervical MRI that needs to be done but wants an open MRI and doesn't want to necessarily go back to [facility].

## 2024-11-18 ENCOUNTER — TELEPHONE (OUTPATIENT)
Dept: FAMILY MEDICINE | Facility: CLINIC | Age: 59
End: 2024-11-18
Payer: COMMERCIAL

## 2024-11-18 DIAGNOSIS — R20.2 PARESTHESIA: ICD-10-CM

## 2024-11-18 RX ORDER — PREGABALIN 25 MG/1
25 CAPSULE ORAL 2 TIMES DAILY
Qty: 60 CAPSULE | Refills: 3 | Status: SHIPPED | OUTPATIENT
Start: 2024-11-18

## 2024-11-18 NOTE — TELEPHONE ENCOUNTER
----- Message from Stella Knutson DO sent at 11/18/2024  9:08 AM CST -----  Contact: Anel  Sure, I sent in the lowest dose, please let her know. We can always increase it if we need to.  ----- Message -----  From: Oly Cummings LPN  Sent: 11/18/2024   9:07 AM CST  To: Stella Knutson DO    Spoke with patient and she stated that the Lyrica you prescribed her is to strong for her. Wants to know if there is a lower dose of it can be replaced with something else? Please advise.  ----- Message -----  From: Helen Romero  Sent: 11/18/2024   8:52 AM CST  To: Eamon Tam called because the medication she was prescribed was to strong for her. Please call her at [phone].    Thanks  Sl

## 2024-11-18 NOTE — TELEPHONE ENCOUNTER
----- Message from Ashia sent at 11/18/2024  9:43 AM CST -----  Contact: KIRBY STOCKTON [[id number]]  .Type:  Patient Returning Call    Who Called: KIRBY STOCKTON [[id number]]  Who Left Message for Patient: Oly Cummings LPN  Does the patient know what this is regarding?:   Would the patient rather a call back or a response via [email or web address]?  call  Best Call Back Number: .[phone]  Additional Information:

## 2024-11-19 ENCOUNTER — TELEPHONE (OUTPATIENT)
Dept: FAMILY MEDICINE | Facility: CLINIC | Age: 59
End: 2024-11-19
Payer: COMMERCIAL

## 2024-11-19 NOTE — TELEPHONE ENCOUNTER
----- Message from Roro sent at 11/19/2024  3:07 PM CST -----  Contact: Anel  .Patient is calling to speak with the nurse regarding medication . Reports wanting to increase the dosage on pregabalin (LYRICA) 25 MG capsule . Please give patient a call back at [phone]

## 2024-11-20 ENCOUNTER — PATIENT MESSAGE (OUTPATIENT)
Dept: FAMILY MEDICINE | Facility: CLINIC | Age: 59
End: 2024-11-20
Payer: COMMERCIAL

## 2024-11-20 ENCOUNTER — TELEPHONE (OUTPATIENT)
Dept: NEUROLOGY | Facility: CLINIC | Age: 59
End: 2024-11-20
Payer: COMMERCIAL

## 2024-11-20 ENCOUNTER — TELEPHONE (OUTPATIENT)
Dept: FAMILY MEDICINE | Facility: CLINIC | Age: 59
End: 2024-11-20
Payer: COMMERCIAL

## 2024-11-20 NOTE — TELEPHONE ENCOUNTER
----- Message from Stella Knutson DO sent at 11/20/2024  8:06 AM CST -----  Contact: Anel  If she has a bunch of the 25 mg she can take 2 twice per day for a 50 mg dose. If that's not enough I'll send in a 100 mg dose.  ----- Message -----  From: Oly Cummings LPN  Sent: 11/19/2024   3:19 PM CST  To: Stella Knutson DO    Per patient 25mg is not strong enough, she stated it works but does not last and wants to know if she can go to the next highest dose after 25. Please advise.  ----- Message -----  From: Roro Julien  Sent: 11/19/2024   3:09 PM CST  To: Eamon Douglas Staff    .Patient is calling to speak with the nurse regarding medication . Reports wanting to increase the dosage on pregabalin (LYRICA) 25 MG capsule . Please give patient a call back at [phone]

## 2024-12-03 ENCOUNTER — OFFICE VISIT (OUTPATIENT)
Dept: NEUROLOGY | Facility: CLINIC | Age: 59
End: 2024-12-03
Payer: COMMERCIAL

## 2024-12-03 ENCOUNTER — TELEPHONE (OUTPATIENT)
Dept: FAMILY MEDICINE | Facility: CLINIC | Age: 59
End: 2024-12-03
Payer: COMMERCIAL

## 2024-12-03 VITALS
WEIGHT: 217.94 LBS | DIASTOLIC BLOOD PRESSURE: 90 MMHG | SYSTOLIC BLOOD PRESSURE: 144 MMHG | RESPIRATION RATE: 12 BRPM | HEART RATE: 64 BPM | BODY MASS INDEX: 32.18 KG/M2

## 2024-12-03 DIAGNOSIS — R20.2 PARESTHESIA: ICD-10-CM

## 2024-12-03 DIAGNOSIS — G43.909 MIGRAINE WITHOUT STATUS MIGRAINOSUS, NOT INTRACTABLE, UNSPECIFIED MIGRAINE TYPE: ICD-10-CM

## 2024-12-03 DIAGNOSIS — M54.2 CERVICALGIA: ICD-10-CM

## 2024-12-03 DIAGNOSIS — R90.89 ABNORMAL FINDING ON MRI OF BRAIN: Primary | ICD-10-CM

## 2024-12-03 PROCEDURE — 99999 PR PBB SHADOW E&M-EST. PATIENT-LVL V: CPT | Mod: PBBFAC,,, | Performed by: NURSE PRACTITIONER

## 2024-12-03 PROCEDURE — 3044F HG A1C LEVEL LT 7.0%: CPT | Mod: CPTII,S$GLB,, | Performed by: NURSE PRACTITIONER

## 2024-12-03 PROCEDURE — 1160F RVW MEDS BY RX/DR IN RCRD: CPT | Mod: CPTII,S$GLB,, | Performed by: NURSE PRACTITIONER

## 2024-12-03 PROCEDURE — 3077F SYST BP >= 140 MM HG: CPT | Mod: CPTII,S$GLB,, | Performed by: NURSE PRACTITIONER

## 2024-12-03 PROCEDURE — 3008F BODY MASS INDEX DOCD: CPT | Mod: CPTII,S$GLB,, | Performed by: NURSE PRACTITIONER

## 2024-12-03 PROCEDURE — 3080F DIAST BP >= 90 MM HG: CPT | Mod: CPTII,S$GLB,, | Performed by: NURSE PRACTITIONER

## 2024-12-03 PROCEDURE — 99203 OFFICE O/P NEW LOW 30 MIN: CPT | Mod: S$GLB,,, | Performed by: NURSE PRACTITIONER

## 2024-12-03 PROCEDURE — 3061F NEG MICROALBUMINURIA REV: CPT | Mod: CPTII,S$GLB,, | Performed by: NURSE PRACTITIONER

## 2024-12-03 PROCEDURE — 1159F MED LIST DOCD IN RCRD: CPT | Mod: CPTII,S$GLB,, | Performed by: NURSE PRACTITIONER

## 2024-12-03 PROCEDURE — 3066F NEPHROPATHY DOC TX: CPT | Mod: CPTII,S$GLB,, | Performed by: NURSE PRACTITIONER

## 2024-12-03 RX ORDER — SUMATRIPTAN 50 MG/1
50 TABLET, FILM COATED ORAL
Qty: 10 TABLET | Refills: 0 | Status: SHIPPED | OUTPATIENT
Start: 2024-12-03

## 2024-12-03 NOTE — ASSESSMENT & PLAN NOTE
Recent MRI brain scan obtained for subjective complaints of paresthesias to entire head  MRI brain scan without intracranial abnormality. Mild/mod white matter hyperintensities likely in the context of microvascular ischemic change.

## 2024-12-03 NOTE — TELEPHONE ENCOUNTER
I spoke with the patient about this. Pt requesting a refill on Sumatriptan. Please advise      Pt also reports she did not switch providers and she is scheduled with Dr. Christie tomorrow.

## 2024-12-03 NOTE — TELEPHONE ENCOUNTER
----- Message from [employer] sent at 12/3/2024  2:19 PM CST -----  Contact: self  ..Type:  Same Day Appointment Request    Caller is requesting a same day appointment.  Caller declined first available appointment listed below.    Name of Caller:RogerioAnel Brendon [place]  When is the first available appointment?tomorrow   Symptoms:headache, ear ache   Best Call Back Number:[phone]  Additional Information: MRN: [id number] [place] or anyone at office     Symptoms: Earache, Sinus Symptoms  Outcome: Schedule an urgent appointment (within 4 hours) or talk to a nurse or provider within 30 minutes.  Reason: Caller denied all higher acuity questions
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
## 2024-12-03 NOTE — ASSESSMENT & PLAN NOTE
Reports of generalized tingling to entire head but also to left temporal and occipital head that is intermittent.   Onset several weeks ago following a respiratory infection.   Pt reports fullness in her ears and frontal head pressure that began after using a nasal saline rinse  She does endorse neck pain and currently in PT  Etiology unclear   - low suspicion for migraine as symptoms are not similar to prev migraines. Post viral? Stress? Med S/E?  Lyrica has offered aid in burning sensation but also may be causing S/E   - risk/benefit  Consider referral to [facility] for eval   Encouraged pt to discuss her med list with her PCP as she appeared confused on some of her meds today (I.e. cholesterol med).   ACB score - 3   - consider alternative to Ditropan to reduce anticholinergic burden. PCP to manage

## 2024-12-04 ENCOUNTER — TELEPHONE (OUTPATIENT)
Dept: FAMILY MEDICINE | Facility: CLINIC | Age: 59
End: 2024-12-04
Payer: COMMERCIAL

## 2024-12-04 ENCOUNTER — OFFICE VISIT (OUTPATIENT)
Dept: FAMILY MEDICINE | Facility: CLINIC | Age: 59
End: 2024-12-04
Payer: COMMERCIAL

## 2024-12-04 VITALS
DIASTOLIC BLOOD PRESSURE: 85 MMHG | SYSTOLIC BLOOD PRESSURE: 132 MMHG | RESPIRATION RATE: 18 BRPM | BODY MASS INDEX: 32.31 KG/M2 | WEIGHT: 218.13 LBS | HEIGHT: 69 IN | HEART RATE: 64 BPM

## 2024-12-04 DIAGNOSIS — E78.2 MIXED HYPERLIPIDEMIA: ICD-10-CM

## 2024-12-04 DIAGNOSIS — F51.01 PRIMARY INSOMNIA: ICD-10-CM

## 2024-12-04 DIAGNOSIS — R20.2 PARESTHESIA: Primary | ICD-10-CM

## 2024-12-04 DIAGNOSIS — L81.9 HYPERPIGMENTATION: ICD-10-CM

## 2024-12-04 DIAGNOSIS — M54.2 CERVICALGIA: ICD-10-CM

## 2024-12-04 DIAGNOSIS — G43.909 MIGRAINE WITHOUT STATUS MIGRAINOSUS, NOT INTRACTABLE, UNSPECIFIED MIGRAINE TYPE: ICD-10-CM

## 2024-12-04 PROCEDURE — 1159F MED LIST DOCD IN RCRD: CPT | Mod: CPTII,S$GLB,, | Performed by: STUDENT IN AN ORGANIZED HEALTH CARE EDUCATION/TRAINING PROGRAM

## 2024-12-04 PROCEDURE — 3044F HG A1C LEVEL LT 7.0%: CPT | Mod: CPTII,S$GLB,, | Performed by: STUDENT IN AN ORGANIZED HEALTH CARE EDUCATION/TRAINING PROGRAM

## 2024-12-04 PROCEDURE — G2211 COMPLEX E/M VISIT ADD ON: HCPCS | Mod: S$GLB,,, | Performed by: STUDENT IN AN ORGANIZED HEALTH CARE EDUCATION/TRAINING PROGRAM

## 2024-12-04 PROCEDURE — 1160F RVW MEDS BY RX/DR IN RCRD: CPT | Mod: CPTII,S$GLB,, | Performed by: STUDENT IN AN ORGANIZED HEALTH CARE EDUCATION/TRAINING PROGRAM

## 2024-12-04 PROCEDURE — 99999 PR PBB SHADOW E&M-EST. PATIENT-LVL V: CPT | Mod: PBBFAC,,, | Performed by: STUDENT IN AN ORGANIZED HEALTH CARE EDUCATION/TRAINING PROGRAM

## 2024-12-04 PROCEDURE — 3066F NEPHROPATHY DOC TX: CPT | Mod: CPTII,S$GLB,, | Performed by: STUDENT IN AN ORGANIZED HEALTH CARE EDUCATION/TRAINING PROGRAM

## 2024-12-04 PROCEDURE — 99214 OFFICE O/P EST MOD 30 MIN: CPT | Mod: S$GLB,,, | Performed by: STUDENT IN AN ORGANIZED HEALTH CARE EDUCATION/TRAINING PROGRAM

## 2024-12-04 PROCEDURE — 3075F SYST BP GE 130 - 139MM HG: CPT | Mod: CPTII,S$GLB,, | Performed by: STUDENT IN AN ORGANIZED HEALTH CARE EDUCATION/TRAINING PROGRAM

## 2024-12-04 PROCEDURE — 3061F NEG MICROALBUMINURIA REV: CPT | Mod: CPTII,S$GLB,, | Performed by: STUDENT IN AN ORGANIZED HEALTH CARE EDUCATION/TRAINING PROGRAM

## 2024-12-04 PROCEDURE — 3079F DIAST BP 80-89 MM HG: CPT | Mod: CPTII,S$GLB,, | Performed by: STUDENT IN AN ORGANIZED HEALTH CARE EDUCATION/TRAINING PROGRAM

## 2024-12-04 PROCEDURE — 3008F BODY MASS INDEX DOCD: CPT | Mod: CPTII,S$GLB,, | Performed by: STUDENT IN AN ORGANIZED HEALTH CARE EDUCATION/TRAINING PROGRAM

## 2024-12-04 RX ORDER — ATORVASTATIN CALCIUM 40 MG/1
40 TABLET, FILM COATED ORAL DAILY
Qty: 90 TABLET | Refills: 3 | Status: SHIPPED | OUTPATIENT
Start: 2024-12-04 | End: 2025-12-04

## 2024-12-04 RX ORDER — ZOLPIDEM TARTRATE 5 MG/1
5 TABLET ORAL NIGHTLY PRN
Qty: 30 TABLET | Refills: 2 | Status: SHIPPED | OUTPATIENT
Start: 2024-12-04 | End: 2025-06-04

## 2024-12-04 RX ORDER — PREGABALIN 50 MG/1
50 CAPSULE ORAL 2 TIMES DAILY
Qty: 90 CAPSULE | Refills: 11 | Status: SHIPPED | OUTPATIENT
Start: 2024-12-04

## 2024-12-04 NOTE — TELEPHONE ENCOUNTER
Please assist with authorization for MRI Cervical Spine Without Contrast to be done at  [facility]
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
## 2024-12-09 ENCOUNTER — TELEPHONE (OUTPATIENT)
Dept: NEUROLOGY | Facility: CLINIC | Age: 59
End: 2024-12-09
Payer: COMMERCIAL

## 2024-12-10 ENCOUNTER — TELEPHONE (OUTPATIENT)
Dept: FAMILY MEDICINE | Facility: CLINIC | Age: 59
End: 2024-12-10
Payer: COMMERCIAL

## 2024-12-11 ENCOUNTER — TELEPHONE (OUTPATIENT)
Dept: FAMILY MEDICINE | Facility: CLINIC | Age: 59
End: 2024-12-11
Payer: COMMERCIAL

## 2024-12-20 ENCOUNTER — OFFICE VISIT (OUTPATIENT)
Dept: NEUROLOGY | Facility: CLINIC | Age: 59
End: 2024-12-20
Payer: COMMERCIAL

## 2024-12-20 VITALS
SYSTOLIC BLOOD PRESSURE: 147 MMHG | HEART RATE: 75 BPM | WEIGHT: 221.69 LBS | DIASTOLIC BLOOD PRESSURE: 96 MMHG | TEMPERATURE: 97 F | BODY MASS INDEX: 32.84 KG/M2 | HEIGHT: 69 IN | RESPIRATION RATE: 17 BRPM

## 2024-12-20 DIAGNOSIS — G43.909 MIGRAINE WITHOUT STATUS MIGRAINOSUS, NOT INTRACTABLE, UNSPECIFIED MIGRAINE TYPE: ICD-10-CM

## 2024-12-20 DIAGNOSIS — G44.86 CERVICOGENIC HEADACHE: Primary | ICD-10-CM

## 2024-12-20 PROCEDURE — 99999 PR PBB SHADOW E&M-EST. PATIENT-LVL V: CPT | Mod: PBBFAC,,, | Performed by: NURSE PRACTITIONER

## 2024-12-20 RX ORDER — RIZATRIPTAN BENZOATE 10 MG/1
10 TABLET, ORALLY DISINTEGRATING ORAL
Qty: 10 TABLET | Refills: 11 | Status: SHIPPED | OUTPATIENT
Start: 2024-12-20 | End: 2025-01-19
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
## 2024-12-20 NOTE — PATIENT INSTRUCTIONS
Please call our clinic at [phone] or send a message on the [facility] portal if there are any changes to the plan described below, for example,if you are not contacted for the requested tests, referral(s) within one week, if you are unable to receive the medications prescribed, or if you feel you need to change the treatment course for any reason.     TESTING: none     REFERRALS: none     PREVENTION (use daily regardless of headache):  - Start Magnesium in ONE of the following preparations -               1. Magnesium oxide 800mg nightly (the most common over the counter kind, may causes loose stools)              2. Magnesium citrate 400-500mg nightly (harder to find, but more neutral on the bowels)              3. Magnesium glycinate 400mg nightly (hardest to find, look online, but most bowel-neutral, best absorbed)   - Consider in the future    AS-NEEDED TREATMENT (use total no more than 10 days per month unless otherwise stated):  - Stop Imitrex - no longer effective  - Start Maxalt as needed for escalations   - Consider Fioricet in the future or CGPR    OTHER:   - Headache journal

## 2024-12-31 ENCOUNTER — TELEPHONE (OUTPATIENT)
Dept: FAMILY MEDICINE | Facility: CLINIC | Age: 59
End: 2024-12-31
Payer: COMMERCIAL

## 2024-12-31 ENCOUNTER — LAB VISIT (OUTPATIENT)
Dept: LAB | Facility: HOSPITAL | Age: 59
End: 2024-12-31
Attending: STUDENT IN AN ORGANIZED HEALTH CARE EDUCATION/TRAINING PROGRAM
Payer: COMMERCIAL

## 2024-12-31 DIAGNOSIS — I10 ESSENTIAL HYPERTENSION: ICD-10-CM

## 2024-12-31 DIAGNOSIS — R73.03 PREDIABETES: ICD-10-CM

## 2024-12-31 DIAGNOSIS — E03.9 ACQUIRED HYPOTHYROIDISM: ICD-10-CM

## 2024-12-31 DIAGNOSIS — J01.10 ACUTE NON-RECURRENT FRONTAL SINUSITIS: Primary | ICD-10-CM

## 2024-12-31 LAB — TSH SERPL DL<=0.005 MIU/L-ACNC: 0.7 UIU/ML (ref 0.4–4)

## 2024-12-31 PROCEDURE — 36415 COLL VENOUS BLD VENIPUNCTURE: CPT | Mod: PO | Performed by: STUDENT IN AN ORGANIZED HEALTH CARE EDUCATION/TRAINING PROGRAM

## 2024-12-31 PROCEDURE — 84443 ASSAY THYROID STIM HORMONE: CPT | Performed by: STUDENT IN AN ORGANIZED HEALTH CARE EDUCATION/TRAINING PROGRAM

## 2024-12-31 RX ORDER — AZITHROMYCIN 250 MG/1
TABLET, FILM COATED ORAL
Qty: 6 TABLET | Refills: 0 | Status: SHIPPED | OUTPATIENT
Start: 2024-12-31

## 2024-12-31 NOTE — TELEPHONE ENCOUNTER
----- Message from Agata sent at 12/31/2024  9:53 AM CST -----  Pt is having sinus pressure in front of head and  pain in ears. Pt is requesting a prescription if applicable. Pt wants you to call her when you see this message at [phone]. Thank you!

## 2024-12-31 NOTE — TELEPHONE ENCOUNTER
No orders of the defined types were placed in this encounter.      Medications Ordered This Encounter   Medications    azithromycin (Z-JENNIFER) 250 MG tablet     Sig: Take 2 tablets by mouth on day 1; Take 1 tablet by mouth on days 2-5     Dispense:  6 tablet     Refill:  0

## 2025-01-06 ENCOUNTER — TELEPHONE (OUTPATIENT)
Dept: FAMILY MEDICINE | Facility: CLINIC | Age: 60
End: 2025-01-06
Payer: COMMERCIAL

## 2025-01-06 ENCOUNTER — PATIENT MESSAGE (OUTPATIENT)
Dept: FAMILY MEDICINE | Facility: CLINIC | Age: 60
End: 2025-01-06
Payer: COMMERCIAL

## 2025-01-06 VITALS — DIASTOLIC BLOOD PRESSURE: 90 MMHG | SYSTOLIC BLOOD PRESSURE: 145 MMHG

## 2025-01-06 NOTE — TELEPHONE ENCOUNTER
I spoke with the patient about this. Pt reports her blood pressure this morning 145/90. Pt asking if she needs any medication changes.       Pt requesting to increase her Mounjaro. Pt currently on 10 mg. Pt requesting this be sent in to [facility]

## 2025-01-06 NOTE — TELEPHONE ENCOUNTER
----- Message from Kaleigh sent at 1/6/2025  1:47 PM CST -----  Contact: Anel  .Type:  Patient Requesting Call    Who Called: Anel   Does the patient know what this is regarding?:Call back in reference to Mounjaro refill and needing a higher dosage   Would the patient rather a call back or a response via [facility]? Call back  Best Call Back Number.[phone]  Additional Information: Patient stated blood pressure was up as well and may need a new medication

## 2025-01-06 NOTE — PROGRESS NOTES
I have sent a msg to patient with the following interpretation (see below):    Tsh (thyroid level) is normal     Last time you were in clinic, your blood pressure was HIGH. Please check at home and send updated readings.      Irene Christie MD

## 2025-01-09 ENCOUNTER — TELEPHONE (OUTPATIENT)
Dept: PHARMACY | Facility: CLINIC | Age: 60
End: 2025-01-09
Payer: COMMERCIAL

## 2025-01-09 NOTE — TELEPHONE ENCOUNTER
[facility]/Population Health Chart Review & Patient Outreach Details For Medication Adherence Project    Reason for Outreach Encounter: 3rd Party payor non-compliance report ([employer], [employer], C, etc)  2.  Patient Outreach Method: Reviewed patient chart   3.   Medication in question:    Diabetes Medications               semaglutide, weight loss, (WEGOVY) 1.7 mg/0.75 mL PnIj Inject 1.7 mg into the skin every 7 days.           Mounjaro d/shanique    4.  Reviewed and or Updates Made To: Patient Chart  5. Outreach Outcomes and/or actions taken: Medication discontinued  Additional Notes:

## 2025-01-16 ENCOUNTER — OFFICE VISIT (OUTPATIENT)
Dept: FAMILY MEDICINE | Facility: CLINIC | Age: 60
End: 2025-01-16
Payer: COMMERCIAL

## 2025-01-16 VITALS
BODY MASS INDEX: 32.73 KG/M2 | TEMPERATURE: 98 F | HEART RATE: 84 BPM | DIASTOLIC BLOOD PRESSURE: 84 MMHG | SYSTOLIC BLOOD PRESSURE: 135 MMHG | OXYGEN SATURATION: 98 % | WEIGHT: 221 LBS | HEIGHT: 69 IN

## 2025-01-16 DIAGNOSIS — E66.09 CLASS 1 OBESITY DUE TO EXCESS CALORIES WITH SERIOUS COMORBIDITY AND BODY MASS INDEX (BMI) OF 32.0 TO 32.9 IN ADULT: ICD-10-CM

## 2025-01-16 DIAGNOSIS — J30.1 SEASONAL ALLERGIC RHINITIS DUE TO POLLEN: ICD-10-CM

## 2025-01-16 DIAGNOSIS — E66.811 CLASS 1 OBESITY DUE TO EXCESS CALORIES WITH SERIOUS COMORBIDITY AND BODY MASS INDEX (BMI) OF 32.0 TO 32.9 IN ADULT: ICD-10-CM

## 2025-01-16 DIAGNOSIS — Z13.9 ENCOUNTER FOR SCREENING INVOLVING SOCIAL DETERMINANTS OF HEALTH (SDOH): Primary | ICD-10-CM

## 2025-01-16 DIAGNOSIS — J30.9 CHRONIC ALLERGIC RHINITIS: ICD-10-CM

## 2025-01-16 DIAGNOSIS — Z23 NEED FOR VACCINATION FOR STREP PNEUMONIAE: ICD-10-CM

## 2025-01-16 PROCEDURE — 3075F SYST BP GE 130 - 139MM HG: CPT | Mod: CPTII,S$GLB,, | Performed by: STUDENT IN AN ORGANIZED HEALTH CARE EDUCATION/TRAINING PROGRAM

## 2025-01-16 PROCEDURE — 99999 PR PBB SHADOW E&M-EST. PATIENT-LVL V: CPT | Mod: PBBFAC,,, | Performed by: STUDENT IN AN ORGANIZED HEALTH CARE EDUCATION/TRAINING PROGRAM

## 2025-01-16 PROCEDURE — 90471 IMMUNIZATION ADMIN: CPT | Mod: S$GLB,,, | Performed by: STUDENT IN AN ORGANIZED HEALTH CARE EDUCATION/TRAINING PROGRAM

## 2025-01-16 PROCEDURE — 90677 PCV20 VACCINE IM: CPT | Mod: S$GLB,,, | Performed by: STUDENT IN AN ORGANIZED HEALTH CARE EDUCATION/TRAINING PROGRAM

## 2025-01-16 PROCEDURE — 3079F DIAST BP 80-89 MM HG: CPT | Mod: CPTII,S$GLB,, | Performed by: STUDENT IN AN ORGANIZED HEALTH CARE EDUCATION/TRAINING PROGRAM

## 2025-01-16 PROCEDURE — 99214 OFFICE O/P EST MOD 30 MIN: CPT | Mod: 25,S$GLB,, | Performed by: STUDENT IN AN ORGANIZED HEALTH CARE EDUCATION/TRAINING PROGRAM

## 2025-01-16 PROCEDURE — G2211 COMPLEX E/M VISIT ADD ON: HCPCS | Mod: S$GLB,,, | Performed by: STUDENT IN AN ORGANIZED HEALTH CARE EDUCATION/TRAINING PROGRAM

## 2025-01-16 PROCEDURE — 1159F MED LIST DOCD IN RCRD: CPT | Mod: CPTII,S$GLB,, | Performed by: STUDENT IN AN ORGANIZED HEALTH CARE EDUCATION/TRAINING PROGRAM

## 2025-01-16 PROCEDURE — 3008F BODY MASS INDEX DOCD: CPT | Mod: CPTII,S$GLB,, | Performed by: STUDENT IN AN ORGANIZED HEALTH CARE EDUCATION/TRAINING PROGRAM

## 2025-01-16 RX ORDER — OLOPATADINE HYDROCHLORIDE 1 MG/ML
1 SOLUTION/ DROPS OPHTHALMIC 2 TIMES DAILY
Qty: 5 ML | Refills: 1 | Status: SHIPPED | OUTPATIENT
Start: 2025-01-16 | End: 2026-01-16

## 2025-01-16 NOTE — PROGRESS NOTES
1. Encounter for screening involving social determinants of health (SDoH)  Overview:  We discussed patient's social determinants of health, including:    Financial resource strain  Utility needs   - referral placed to outpatient case management for assistance   - counseling offered regarding above mentioned social determinants of health for 3-10 minutes.         Orders:  -     Ambulatory referral/consult to Outpatient Case Management    2. Need for vaccination for Strep pneumoniae  -     pneumoc 20-ken conj-dip cr(PF) (PREVNAR-20 (PF)) injection Syrg 0.5 mL    3. Seasonal allergic rhinitis due to pollen  -     olopatadine (PATANOL) 0.1 % ophthalmic solution; Place 1 drop into both eyes 2 (two) times daily.  Dispense: 5 mL; Refill: 1  -     Ambulatory referral/consult to Allergy; Future; Expected date: 01/23/2025    4. Chronic allergic rhinitis  Overview:  Minimal relief with flonase, azelastine, zyrtec   Start xyzal, cont Singulair   Referral to ENT and allergy      5. Class 1 obesity due to excess calories with serious comorbidity and body mass index (BMI) of 32.0 to 32.9 in adult  Overview:  Wt Readings from Last 3 Encounters:   01/16/25 1217 100.2 kg (221 lb)   12/20/24 1007 100.5 kg (221 lb 10.8 oz)   12/04/24 0929 98.9 kg (218 lb 1.6 oz)    On compounded glp1      Patient denies family history and personal history of thyroid cancer, pancreatic cancer or pancreatitis.       General weight loss/lifestyle modification strategies discussed: limit sugary drinks, exercise 3-5x per week  Informal exercise measures discussed, e.g. taking stairs instead of elevator.     hx of VSG             Assessment & Plan    MIGRAINE:  - Assessed current migraine management with Maxalt and magnesium.  - Reviewed recent neurologist recommendations.  - Advised the patient to continue taking magnesium at night for migraine prevention.  - Scheduled a follow-up visit with the neurologist in 3 months to assess the effectiveness of  prescribed medications.    ALLERGIES:  - Evaluated the patient's ongoing allergy symptoms, including ear fullness, nasal congestion, and eye irritation.  - Noted that CT of sinuses was clear.  - Explained the role of ear, nose, and throat specialists in allergy management.  - Discussed potential benefits of allergy shots for persistent symptoms.  - Discontinued Flutinazole nasal spray due to reported side effects.  - Prescribed Flonase and Azelastine nasal sprays for allergy management.  - Referred the patient to Dr. Aceves, allergist in [place], for comprehensive allergy evaluation and potential allergy shot treatment.  - Considered prescribing Medrol Dosepak (steroid) to address potential allergic reaction, but the patient declined due to sleep concerns.    CATARACT:  - Noted that the patient's recent eye exam revealed a small cataract.  - The optometrist determined no immediate action was needed for the cataract.    SCALP IRRITATION:  - Evaluated scalp irritation, potentially due to product sensitivity.  - Advised on scalp care and potential allergic reactions to hair products.  - Instructed the patient to wash hair with mild baby soap.  - Recommend avoiding application of products to scalp for 1-2 weeks to allow irritation to subside.  - Suggested using olive oil if necessary for scalp care.  - Instructed the patient to contact the office if scalp irritation persists after 1-2 weeks without product use.    MEDICATIONS/SUPPLEMENTS:  - Considered prescribing Lyrica twice daily to address tingling sensation.            Irene Christie MD  _________________________________________________________________________      Patient ID: Anel Schmitt is a 59 y.o. female.    History of Present Illness    CHIEF COMPLAINT:  Patient presents today for follow-up on multiple health concerns.    ALLERGIES AND ENT:  She reports bilateral eye discomfort and ears feeling constantly stopped up. She has tried Flonase and  "Azelastine but is uncertain which treatments were most effective. She has scheduled an appointment with an allergist on the 28th for further evaluation.    EYE HEALTH:  Recent eye exam revealed early cataracts, with no intervention recommended at this time.    SCALP CONCERNS:  She reports a burning sensation on her scalp following application of a botanical oil that was left in place. She denies itching and has been using olive oil for symptom relief.    MIGRAINE MANAGEMENT:  She continues rizatriptan sublingual tablets for migraines and takes magnesium supplementation nightly as prescribed.    WEIGHT MANAGEMENT MEDICATIONS:  She currently takes two doses of Mounjaro ("red dose"). Her last Wegovy prescription was for 1.7 mg, which she was taking every two weeks.          Past medical histories reviewed, including past medical, surgical, family and social histories.      Current Outpatient Medications on File Prior to Visit   Medication Sig Dispense Refill    amLODIPine (NORVASC) 2.5 MG tablet Take 1 tablet (2.5 mg total) by mouth once daily. 90 tablet 3    atorvastatin (LIPITOR) 40 MG tablet Take 1 tablet (40 mg total) by mouth once daily. 90 tablet 3    azelastine (ASTELIN) 137 mcg (0.1 %) nasal spray 1 spray (137 mcg total) by Nasal route 2 (two) times daily. 30 mL 3    clotrimazole-betamethasone 1-0.05% (LOTRISONE) cream Apply topically 2 (two) times daily. 45 g 0    diclofenac sodium (VOLTAREN) 1 % Gel Apply 2 g topically 2 (two) times daily as needed (for arthritis pain). 200 g 2    estradioL (ESTRACE) 0.01 % (0.1 mg/gram) vaginal cream APPLY 3 GRAMS  VAGINALLY TWICE A WEEK *THANK YOU* 42.5 g 11    fluorometholone 0.1% (FML) 0.1 % DrpS Place into both eyes.      levalbuterol (XOPENEX) 1.25 mg/3 mL nebulizer solution Take 3 mLs (1.25 mg total) by nebulization every 6 (six) hours as needed for Wheezing. Rescue 3 mL 11    levocetirizine (XYZAL) 5 MG tablet Take 1 tablet (5 mg total) by mouth every evening. 90 " tablet 3    linaCLOtide (LINZESS) 145 mcg Cap capsule Take 1 capsule (145 mcg total) by mouth before breakfast. 90 capsule 1    nystatin (MYCOSTATIN) powder Apply topically 2 (two) times daily. 60 g 11    oxybutynin (DITROPAN-XL) 5 MG TR24 Take 1 tablet (5 mg total) by mouth once daily. 90 tablet 3    pantoprazole (PROTONIX) 40 MG tablet Take 1 tablet (40 mg total) by mouth once daily. 90 tablet 3    pregabalin (LYRICA) 50 MG capsule Take 1 capsule (50 mg total) by mouth 2 (two) times daily. 90 capsule 11    rizatriptan (MAXALT-MLT) 10 MG disintegrating tablet Take 1 tablet (10 mg total) by mouth as needed for Migraine. May repeat in 2 hours if needed 10 tablet 11    simethicone (MYLICON) 125 mg Cap capsule Take 1 capsule (125 mg total) by mouth 4 (four) times daily as needed for Flatulence. 90 capsule 2    sumatriptan (IMITREX) 50 MG tablet Take 1 tablet (50 mg total) by mouth as needed for Migraine. 10 tablet 0    tobramycin-dexAMETHasone 0.3-0.1% (TOBRADEX) 0.3-0.1 % DrpS Place into both eyes.      triamcinolone acetonide 0.1% (KENALOG) 0.1 % cream Apply topically 2 (two) times daily. 28.4 g 1    zolpidem (AMBIEN) 5 MG Tab Take 1 tablet (5 mg total) by mouth nightly as needed. 30 tablet 2     No current facility-administered medications on file prior to visit.       Review of Systems   12 point review of systems negative except for listed in HPI.     Objective:    Nursing note and vitals reviewed.  Vitals:    01/16/25 1217   BP: 135/84   Pulse: 84   Temp: 98 °F (36.7 °C)     Body mass index is 32.64 kg/m².     Physical Exam   Constitutional: oriented to person, place, and time. well-developed and well-nourished. No distress.   HENT: WNL  Head: Normocephalic and atraumatic.   Eyes: EOM are normal.   Neck: Normal range of motion. Neck supple.   Cardiovascular: Normal rate  Pulmonary/Chest: Effort normal. No respiratory distress.   GI: soft, non distended, no ttp, no rebound/guarding  Musculoskeletal: Normal range  of motion. no edema.   Neurological: CN II-XII intact  Skin: warm and dry.   Psychiatric: normal mood and affect. behavior is normal.   Physical Exam    Vitals: Normal blood pressure.               We Offer Telehealth & Same Day Appointments!   Book your Telehealth appointment with me through my nurse or   Clinic appointments on [employer]!  [phone]     To Schedule appointments online, go to [employer]: [email or web address]       Visit today included increased complexity associated with the care of the episodic problem addressed and managing the longitudinal care of the patient due to the serious and/or complex managed problem(s) as per problem list.     This note was generated with the assistance of ambient listening technology. Verbal consent was obtained by the patient and accompanying visitor(s) for the recording of patient appointment to facilitate this note. I attest to having reviewed and edited the generated note for accuracy, though some syntax or spelling errors may persist. Please contact the author of this note for any clarification.

## 2025-01-17 DIAGNOSIS — I10 ESSENTIAL HYPERTENSION: ICD-10-CM

## 2025-01-17 RX ORDER — HYDROCHLOROTHIAZIDE 12.5 MG/1
12.5 TABLET ORAL
Qty: 90 TABLET | Refills: 3 | Status: SHIPPED | OUTPATIENT
Start: 2025-01-17

## 2025-01-17 NOTE — TELEPHONE ENCOUNTER
Refill Decision Note   Anel Schmitt  is requesting a refill authorization.  Brief Assessment and Rationale for Refill:  Approve     Medication Therapy Plan:         Comments:     Note composed:3:24 PM 01/17/2025

## 2025-01-17 NOTE — TELEPHONE ENCOUNTER
No care due was identified.  [facility] Embedded Care Due Messages. Reference number: [id number].   1/17/2025 1:50:24 PM CST

## 2025-01-24 ENCOUNTER — PATIENT OUTREACH (OUTPATIENT)
Dept: ADMINISTRATIVE | Facility: OTHER | Age: 60
End: 2025-01-24
Payer: COMMERCIAL

## 2025-01-28 ENCOUNTER — OFFICE VISIT (OUTPATIENT)
Dept: OTOLARYNGOLOGY | Facility: CLINIC | Age: 60
End: 2025-01-28
Payer: COMMERCIAL

## 2025-01-28 ENCOUNTER — TELEPHONE (OUTPATIENT)
Dept: OTOLARYNGOLOGY | Facility: CLINIC | Age: 60
End: 2025-01-28

## 2025-01-28 ENCOUNTER — HOSPITAL ENCOUNTER (OUTPATIENT)
Dept: RADIOLOGY | Facility: HOSPITAL | Age: 60
Discharge: HOME | End: 2025-01-28
Attending: NURSE PRACTITIONER
Payer: COMMERCIAL

## 2025-01-28 ENCOUNTER — CLINICAL SUPPORT (OUTPATIENT)
Dept: AUDIOLOGY | Facility: CLINIC | Age: 60
End: 2025-01-28
Payer: COMMERCIAL

## 2025-01-28 VITALS — BODY MASS INDEX: 33.5 KG/M2 | WEIGHT: 226.88 LBS

## 2025-01-28 DIAGNOSIS — H92.03 REFERRED OTALGIA, BILATERAL: ICD-10-CM

## 2025-01-28 DIAGNOSIS — R51.9 SINUS HEADACHE: Primary | ICD-10-CM

## 2025-01-28 DIAGNOSIS — R51.9 SINUS HEADACHE: ICD-10-CM

## 2025-01-28 DIAGNOSIS — H93.8X9 EAR FULLNESS, UNSPECIFIED LATERALITY: ICD-10-CM

## 2025-01-28 DIAGNOSIS — H93.8X3 SENSATION OF PRESSURE IN EAR, BILATERAL: Primary | ICD-10-CM

## 2025-01-28 DIAGNOSIS — J34.89 SINUS PAIN: ICD-10-CM

## 2025-01-28 DIAGNOSIS — J34.89 SINUS PRESSURE: ICD-10-CM

## 2025-01-28 DIAGNOSIS — J31.0 CHRONIC RHINITIS: ICD-10-CM

## 2025-01-28 DIAGNOSIS — H93.8X3 SENSATION OF PRESSURE IN EAR, BILATERAL: ICD-10-CM

## 2025-01-28 PROCEDURE — 1159F MED LIST DOCD IN RCRD: CPT | Mod: CPTII,S$GLB,, | Performed by: NURSE PRACTITIONER

## 2025-01-28 PROCEDURE — 70220 X-RAY EXAM OF SINUSES: CPT | Mod: 26,,, | Performed by: RADIOLOGY

## 2025-01-28 PROCEDURE — 99999 PR PBB SHADOW E&M-EST. PATIENT-LVL V: CPT | Mod: PBBFAC,,, | Performed by: NURSE PRACTITIONER

## 2025-01-28 PROCEDURE — 99214 OFFICE O/P EST MOD 30 MIN: CPT | Mod: S$GLB,,, | Performed by: NURSE PRACTITIONER

## 2025-01-28 PROCEDURE — 99999 PR PBB SHADOW E&M-EST. PATIENT-LVL I: CPT | Mod: PBBFAC,,,

## 2025-01-28 PROCEDURE — 70220 X-RAY EXAM OF SINUSES: CPT | Mod: TC,FY,PO

## 2025-01-28 PROCEDURE — 92567 TYMPANOMETRY: CPT | Mod: S$GLB,,,

## 2025-01-28 PROCEDURE — 3008F BODY MASS INDEX DOCD: CPT | Mod: CPTII,S$GLB,, | Performed by: NURSE PRACTITIONER

## 2025-01-28 NOTE — TELEPHONE ENCOUNTER
----- Message from Chuyita He NP sent at 1/28/2025 10:12 AM CST -----  Sinuses are open and clear.  Please reach out to your neurologist regarding facial pain as there is no sinusitis.

## 2025-01-28 NOTE — PATIENT INSTRUCTIONS
"ENT SINUS REGIMEN:    Chronic nasal congestion is usually either: (1) anatomical/structural or (2) inflammatory in nature.     (1) If anatomical/structural, an ENT surgeon can discuss whether nasal surgery is indicated. Sometimes it is a matter of correcting a deviated septum or making the nasal turbinates smaller. Sometimes it is a matter of removing nasal polyps or clearing out the sinuses.     (2) If it is inflammatory, a steroid nasal spray and daily nasal saline rinsing is recommended. Consideration of allergy testing with the possibility of allergy shots may be warranted. A daily antihistamine is also recommended. Over-the-counter nasal decongestants are NOT recommended for more than 2-3 days. Also daily nasal saline rinsing is advised to decrease the allergic burden in the nose.     There is a wide variety of non-surgical nasal options, including but not limited to: nasal steroids twice daily, Ponaris nasal emollient twice daily, humidifier/diffuser, "Mute" nasal cuffs, "MedCline" wedge pillow, BreatheRight strips, etc. and if after two months of all of the above, patient is still unsatisfied with nasal breathing, then consider seeing ENT surgeon to discuss possible surgical treatment options.     Ponaris Nasal Emollient is used for the relief of: nasal congestion due to colds, nasal irritation, allergy exacerbations, nasal crusting. Specifically prepared iodized organic oils of pine, eucalyptus, peppermint, cajeput, and cottonseed. To order Ponaris: ask your pharmacist to order it for you or we carry it in our pharmacy downstairs on the first floor.      DIFFERENT TYPES OF "ENT-APPROVED" NASAL SPRAYS:  Flonase / fluticasone / Nasacort / Rhinocort (steroid spray) is best for stuffy, pressure, fullness. Available over-the-counter without a prescription.    Astepro / azelastine (antihistamine spray) is best for itchy, drippy, sneezy. Available over-the-counter without a prescription.     Use as directed, " spraying 1-2 times in each nostril each day. It may take 2-3 days to 2-3 weeks to begin seeing improvement. This medication needs to be taken consistently to see results. Overall, this is a well-tolerated medication with low side effects. The benefit of nasal steroids as opposed to oral steroids is that the nasal steroid spray works primarily in the nose. Common side effects can include: headache, nasal dryness, minor nose bleed.  Rare side effects may include:  septal perforation, elevation in eye pressure, dry eyes, change in smell, allergic reaction.  Notify your provider if you have any concerns or experience these symptoms.     Nasal spray instructions:  Blow nose first gently to clean. Keep chin level with the floor (do not tilt head forward or back). Using the opposite hand (example: right hand for left nostril, left hand for right nostril) insert nasal spray taking caution to direct it AWAY from the middle wall inside the nose (septum) to avoid irritating nasal septum which could cause nosebleed.  Do not tilt spray up but rather flat and out along the roof of your mouth to spray. Angle the tip of the spray out slightly toward the direction of the ears; then spray. Do not take quick vigorous sniff but rather slow gentle inhalation while waiting for medication to absorb into nasal passages. Then administer second spray in same way.     Nasal saline rinse kit (use Neti pot or [employer] sinus rinse kit) -- Rinse your sinuses once to twice daily to reduce the allergen burden in your nose. Use sterile water (boiled tap water which has cooled) or distilled bottled water. Add 1/4 teaspoon sea salt and a pinch of baking soda or a mixture packet from the maker of your sinus rinse kit.  Rinse through both sides of nose to cleanse sinus and nasal passages, bending forward with head tilted down. Keep your mouth open, without holding your breath. Squeeze bottle gently until solution starts draining from the opposite nasal  "passage. After bottle is empty, blow nose very gently, without pinching nose completely, to avoid pressure on eardrums.  There are useful YouTube videos that show demonstration of how to do these properly.     If after 2-3 months of regular, twice daily use of Flonase + Astepro, you remain unable to breathe effectively through your nose, you should return to see one of our ENT surgeons to discuss other options such as an in-office procedure called VivAer versus traditional surgery done under anesthesia to improve your nasal passages.           The "gold standard" for determining the presence or absence of middle ear fluid is tympanometry.  You have normal, Type "A" tympanograms, which means no fluid behind your ear drums.  Since there is no middle ear fluid, there is no infection. When there is fluid behind your ear drum, your hearing will be significantly impaired on that side, because sound waves do not pass through fluid the way they pass through air.  If your hearing is unimpaired, fluid behind the ear drum is not likely.     Other possible causes for continued ear pain can include but are not limited to:   dental pathology or TMJ (jaw joint arthritis) -- see your dentist  cervical spine arthritis (discuss possible imaging/MRI with PCP)  myofascial pain syndrome/headaches or neuralgias (see your neurologist)  GERD (anti-acid reflux medications twice daily and see your GI)  head/neck neoplasms (CT neck w/contrast)  "

## 2025-01-28 NOTE — PROGRESS NOTES
Anel Schmitt was seen 01/28/2025 for tympanometry per order from Chuyita He NP, ENT due to complaint of aural fullness AU. Pt was alone during today's visit. Results indicate Type As for the right ear and Type As for the left ear.    Results will be reviewed by ENT following this encounter. All complaints were addressed during this visit to the patient's satisfaction. Plan of care was discussed in detail with the patient, who agreed with the plan as above. Testing completed by Brett Georges, L-AUD, CCC-A and supervised by Evette Raman MS, RYAN, CCC-A.

## 2025-01-28 NOTE — PROGRESS NOTES
Subjective     Patient ID: Anel Schmitt is a 59 y.o. female.    Chief Complaint: Nasal Congestion, Ear Fullness, and Sinus Problem    HPI  Patient saw me 3 months ago for sinus pressure/pain/headaches. Imaging negative for sinusitis. She was encouraged to use daily saline rinsing and nasal sprays, and reach out to her PCP regarding headaches. She is in PT for cervicalgia and myofascial pain. Tried gabapentin, then Lyrica. Also evaluated by neurology for cervicogenic headaches and migraines. Recently given Patanol for allergic conjunctivitis, start xyzal, continue Singulair, and referred to allergist (not yet scheduled).   Patient denies significant allergic stigmata:  No itchy, red, watery nose; no excessive sneezing or stuffiness. Patient denies s/s of acute bacterial sinusitis:  No mucopus from nose or throat, no facial swelling/pain, no dental pain, no diminished olfaction/taste, no headaches around the eyes, no sore throat or productive cough.     Review of Systems   Constitutional: Negative.  Negative for fever.   HENT:  Positive for voice change. Negative for nasal congestion, dental problem, facial swelling, postnasal drip, rhinorrhea, sinus pressure/congestion, sneezing, sore throat and trouble swallowing.    Eyes: Negative.    Respiratory: Negative.  Negative for cough, choking and shortness of breath.    Cardiovascular: Negative.    Gastrointestinal: Negative.    Musculoskeletal: Negative.    Integumentary:  Negative.   Neurological:  Positive for headaches.   Hematological: Negative.    Psychiatric/Behavioral: Negative.            Objective     Physical Exam  Vitals and nursing note reviewed.   Constitutional:       General: She is not in acute distress.     Appearance: She is well-developed. She is not ill-appearing or diaphoretic.   HENT:      Head: Normocephalic and atraumatic.      Right Ear: Hearing, tympanic membrane, ear canal and external ear normal. No middle ear effusion. Tympanic membrane  "is not erythematous. Tympanic membrane has normal mobility.      Left Ear: Hearing, tympanic membrane, ear canal and external ear normal.  No middle ear effusion. Tympanic membrane is not erythematous. Tympanic membrane has normal mobility.      Ears:      Comments: Type "A" tympanograms AU consistent with well-pneumatized mesotympanums and absence of middle ear effusions     Nose: Nasal deformity (spur right) present. No mucosal edema, congestion or rhinorrhea.      Right Nostril: No occlusion.      Left Nostril: No occlusion.      Right Sinus: Maxillary sinus tenderness (4/10) present. No frontal sinus tenderness.      Left Sinus: Maxillary sinus tenderness (4/10) present. No frontal sinus tenderness.      Mouth/Throat:      Mouth: Mucous membranes are not pale, not dry and not cyanotic. No oral lesions.      Dentition: Has dentures.      Tongue: No lesions.      Palate: No lesions.      Pharynx: Uvula midline. No oropharyngeal exudate or posterior oropharyngeal erythema.      Tonsils: 2+ on the right. 2+ on the left.   Eyes:      General: Lids are normal. No scleral icterus.        Right eye: No discharge.         Left eye: No discharge.   Neck:      Thyroid: No thyroid mass or thyromegaly.      Trachea: Trachea normal. No tracheal deviation.   Cardiovascular:      Rate and Rhythm: Normal rate.   Pulmonary:      Effort: Pulmonary effort is normal. No respiratory distress.      Breath sounds: Normal air entry.   Musculoskeletal:         General: Normal range of motion.      Cervical back: Normal range of motion and neck supple.   Lymphadenopathy:      Head:      Right side of head: No submental, submandibular, tonsillar, preauricular or posterior auricular adenopathy.      Left side of head: No submental, submandibular, tonsillar, preauricular or posterior auricular adenopathy.      Cervical: No cervical adenopathy.      Right cervical: No superficial or posterior cervical adenopathy.     Left cervical: No " superficial or posterior cervical adenopathy.   Skin:     General: Skin is warm and dry.      Coloration: Skin is not pale.      Findings: No lesion or rash.   Neurological:      Mental Status: She is alert and oriented to person, place, and time.      Motor: Motor function is intact.      Coordination: Coordination is intact.      Gait: Gait normal.   Psychiatric:         Attention and Perception: Attention normal.         Mood and Affect: Mood normal.         Speech: Speech normal.         Behavior: Behavior normal. Behavior is cooperative.            Assessment and Plan     1. Sinus headache  -     X-Ray Sinuses Min 3 Views; Future; Expected date: 01/28/2025    2. Chronic rhinitis  -     Allergen, Cocklebur; Future; Expected date: 01/28/2025  -     Allergen, Elm Cedar; Future; Expected date: 01/28/2025  -     Allergen, Meadow Grass ([place] Blue); Future; Expected date: 01/28/2025  -     Allergen, Mucor Racemosus; Future; Expected date: 01/28/2025  -     Allergen Pecan Tree IgE; Future; Expected date: 01/28/2025  -     Allergen, White Deon; Future; Expected date: 01/28/2025  -     Allergen Alternaria Alternata IgE; Future; Expected date: 01/28/2025  -     Allergen-Lamoure; Future; Expected date: 01/28/2025  -     Allergen Common Pigweed IgE; Future; Expected date: 01/28/2025  -     Allergen-Silver Birch; Future; Expected date: 01/28/2025  -     Allergen Aspergillus Fumagatus IgE; Future; Expected date: 01/28/2025  -     Allergen Bermuda Grass IgE; Future; Expected date: 01/28/2025  -     Cat epithelium IgE; Future; Expected date: 01/28/2025  -     Cladosporium IgE; Future; Expected date: 01/28/2025  -     [place], bald IgE; Future; Expected date: 01/28/2025  -     Allergen D Farinae (Dust Mite) IgE; Future; Expected date: 01/28/2025  -     Allergen D Pteronyssinus (Dust Mite) IgE; Future; Expected date: 01/28/2025  -     Allergen Dog Dander IgE; Future; Expected date: 01/28/2025  -     Feather Panel #2; Future;  Expected date: 01/28/2025  -     Allergen Rodri Grass IgE; Future; Expected date: 01/28/2025  -     Pinto elder, rough IgE; Future; Expected date: 01/28/2025  -     Mugwort IgE; Future; Expected date: 01/28/2025  -     Nettle IgE; Future; Expected date: 01/28/2025  -     Allergen Oak IgE; Future; Expected date: 01/28/2025  -     Allergen Penicillium IgE; Future; Expected date: 01/28/2025  -     Allergen English Plantain IgE; Future; Expected date: 01/28/2025  -     RAST Allergen Maple ([place]); Future; Expected date: 01/28/2025  -     RAST Allergen Bushnell; Future; Expected date: 01/28/2025  -     RAST Allergen for Eastern [place]; Future; Expected date: 01/28/2025  -     RAST Allergen, Lamb's Quarters; Future; Expected date: 01/28/2025  -     RAST Allergen, Sheep Tyhee(Yellow Dock); Future; Expected date: 01/28/2025  -     Ragweed, short, common IgE; Future; Expected date: 01/28/2025  -     Kaden IgE; Future; Expected date: 01/28/2025  -     Allergen Bahia Grass IgE; Future; Expected date: 01/28/2025  -     Chaetomium globosum IgE; Future; Expected date: 01/28/2025  -     Allergen Curvularia Lunata IgE; Future; Expected date: 01/28/2025  -     Setomalanomma rostrata IgE; Future; Expected date: 01/28/2025  -     Phoma betae IgE; Future; Expected date: 01/28/2025  -     Allergen-[place]wood; Future; Expected date: 01/28/2025  -     Allergen-Maple [place]; Future; Expected date: 01/28/2025  -     Allergen White Pine IgE; Future; Expected date: 01/28/2025  -     Pollen, walnut IgE; Future; Expected date: 01/28/2025  -     [place], black IgE; Future; Expected date: 01/28/2025  -     Ragweed, Western IgE; Future; Expected date: 01/28/2025  -     Thistle, Russian IgE; Future; Expected date: 01/28/2025  -     Allergen Horse Dander IgE; Future; Expected date: 01/28/2025  -     IGE; Future; Expected date: 01/28/2025    3. Sinus pain    4. Sinus pressure    5. Referred otalgia, bilateral    6. Sensation of  "pressure in ear, bilateral      X-rays today to rule out sinusitis etiology for headaches. If imaging is negative for sinusitis etiology today, patient is encouraged to return to neurology to discuss headaches.   Reassured negative aural exam with type "A" tymps. Otalgia is therefore likely secondary to musculoskeletal etiology. (Imaging today will show mastoids as well.)  Patient states she was unable to tolerate Pablo Med sinus rinse kit as it caused intranasal burning. Recommend Simply Saline in canister, lean forward over sink and spray both sides twice daily. Discussed Ponaris nasal emollient QHS for nocturnal nasal congestion that alternates sides (side-dependent).   RAST and IgE blood testing to determine whether issues are allergy-related. If multiple positives are noted, patient is encouraged to follow through with referral to allergist per Dr. Christie.   Offered NPL scope exam today to look at vocal cords. Patient has past h/o VC nodules, underwent ST in [place]. Patient declined laryngoscope evaluation today.   Patient encouraged to return to clinic if symptoms worsen/persist and as needed for further ENT symptoms or concerns.          No follow-ups on file.        "

## 2025-01-31 ENCOUNTER — TELEPHONE (OUTPATIENT)
Dept: ADMINISTRATIVE | Facility: OTHER | Age: 60
End: 2025-01-31
Payer: COMMERCIAL

## 2025-01-31 ENCOUNTER — TELEPHONE (OUTPATIENT)
Dept: PHARMACY | Facility: CLINIC | Age: 60
End: 2025-01-31
Payer: COMMERCIAL

## 2025-01-31 ENCOUNTER — PATIENT MESSAGE (OUTPATIENT)
Dept: PHARMACY | Facility: CLINIC | Age: 60
End: 2025-01-31
Payer: COMMERCIAL

## 2025-01-31 ENCOUNTER — PATIENT OUTREACH (OUTPATIENT)
Dept: ADMINISTRATIVE | Facility: OTHER | Age: 60
End: 2025-01-31
Payer: COMMERCIAL

## 2025-01-31 NOTE — TELEPHONE ENCOUNTER
Good morning,     Ms Schmitt needs assistance paying for her medications.    hydroCHLOROthiazide (HYDRODIURIL) 12.5 MG Tab  olopatadine (PATANOL) 0.1 % ophthalmic solution  rizatriptan (MAXALT-MLT) 10 MG disintegrating tablet  atorvastatin (LIPITOR) 40 MG tablet  pregabalin (LYRICA) 50 MG capsule  zolpidem (AMBIEN) 5 MG Tab  sumatriptan (IMITREX) 50 MG tablet  amLODIPine (NORVASC) 2.5 MG tablet  estradioL (ESTRACE) 0.01 % (0.1 mg/gram) vaginal cream  pantoprazole (PROTONIX) 40 MG tablet  diclofenac sodium (VOLTAREN) 1 % Gel  levalbuterol (XOPENEX) 1.25 mg/3 mL nebulizer solution  azelastine (ASTELIN) 137 mcg (0.1 %) nasal spray  levocetirizine (XYZAL) 5 MG tablet  fluorometholone 0.1% (FML) 0.1 % DrpS  tobramycin-dexAMETHasone 0.3-0.1% (TOBRADEX) 0.3-0.1 % DrpS    Thank you,    Iqra Lo  [facility] Care Coordinator  [phone]

## 2025-01-31 NOTE — TELEPHONE ENCOUNTER
We have reviewed Ms. Schmitt current medication list and/or insurance status. Unfortunately, The Pharmacy Patient Assistance Team is unable to assist at this time due to the following reasons      There are no Manufacture or Co-pay Savings Programs available for requested medication.                 Aminta Zamudio  Pharmacy Patient Assistance Team

## 2025-01-31 NOTE — TELEPHONE ENCOUNTER
We have reached out to Ms. Schmitt via letter and portal message  to inform her of the evolso application process for Linzess. A follow-up phone call will be made in 5 business days.    Aminta Zamudio  Pharmacy Patient Assistance Team    Dorsal Nasal Flap Text: Given the location of the defect, inherent tension at the surgical site, and the proximity to free margins a dorsal nasal flap was deemed most appropriate for wound reconstruction. The risks, benefits, and possible outcomes of this procedure were discussed along with the risks, benefits, and possible outcomes of other options for wound repair (including but not limited to: complex closure, other flaps, grafts, and second intention). The patient verbalized understanding and consent to the outlined procedure. The patient verbalized understanding that intraoperative conditions may necessitate a change in the outlined procedure resulting in modification of the original surgical plan. This decision may be made at the discretion of the surgeon, and is due to factors subject to change or that are difficult to predict preoperatively. We also discussed that should the patient heal with a scar they find noticeable/unfavorable additional surgical procedures and/or referral to additional specialists may be required. The patient understands the healing process takes place over many months and that the \"final\" appearance of the scar line often takes a year or longer to achieve.\\nUsing a sterile surgical marker, an appropriate dorsal nasal flap was drawn incorporating the defect and placing the expected incisions within the relaxed skin tension lines where possible. The surgical site and surrounding skin were prepped and draped in sterile fashion.  The area thus outlined was incised in the appropriate surgical plane with a #15 scalpel blade. The resulting defect was undermined widely and bilaterally in the appropriate surgical plane taking care to preserve local arteries, veins, nerves, and other structures of anatomic importance. This created a flap capable of rotating across the defect to close the wound under minimal tension. Hemostasis was obtained and then the flap was sutured together in layered fashion.

## 2025-01-31 NOTE — PATIENT INSTRUCTIONS
Food [place]    [employer]  [address] [place] [address]    [employer]  [address] [place] [address]    [facility]  [id number] [place] [id number]    Commodities  [phone] Option 1    Utility Assistance    [place] [employer]  [phone]    [facility]  [phone]    [facility]   [phone]    Help Pay for Housing    [facility]  [phone]    [facility]  [phone]    Transportation  No resources    Medications     211    Try your local churches

## 2025-01-31 NOTE — PROGRESS NOTES
CHW - Initial Contact    This Community Health Worker completed the Social Determinant of Health questionnaire with patient via telephone today.    Pt identified barriers of most importance are: Mortgage, utility assistance, transportation, food, paying for medication   Referrals to community agencies completed with patient/caregiver consent outside of [facility] include: [facility]  Referrals were put through [facility] - no:   Support and Services: Completed SDOH questionnaire, referral to [facility], mailed resources  Other information discussed the patient needs / wants help with: None   Follow up required: Yes  Follow-up Outreach - Due: 2/7/2025        LPN spoke to patient/caregiver as per [facility] referral for: utilities help    Does the patient consent to completing the assessment/enrollment: Yes  Does the patient consent for LPN to speak to a caregiver? No    Health Insurance Coverage:     Does the patient have adequate health insurance coverage? Yes  Education provided: No    PCP Follow-Up Appointments:    Does the patient have a primary care provider? yes - Dr. Irene Christie  Date of last PCP appointment? 1/6/25  Next PCP appointment:  2/4/25  Was patient provided with education surrounding PCP services/creating a medical home? yes - Educated on the benefits of having a PCP.  Educated on the use of urgent care clinic for non emergent issues when PCP unavailable.  Patient verbalized understanding.       Specialist Appointments:     Does the patient have a pending specialist referral? no  Does the patient have an upcoming specialist appointment? yes - Dermatology 2/25/25, Neurology 3/20/25  Is the patient pregnant? No  Does the patient have a mental health provider? yes - PCP She denies SI/HI.  Instructed if she develops SI/HI to present to the ED for evaluation, educated on 988.  Patient verbalized understanding.        Home Medications:     Reviewed  "medication list with patient? Yes  Is the patient able to afford their medications? No  Offered to refer to [facility], patient accepted.  Patient states she is on Jardiance.  Not on MAR.    Care Gaps:     Does the patient have any care gaps that are due? Yes    Care Gaps     Overdue          Never done TETANUS VACCINE (Every 10 Years)     Recent lab results:  Blood Sugar: does not check, has no glucometer   Provided education: Yes  Blood Pressure: 134/76 Patient states she has no BP cuff and she goes "to town" to check her BP.   Provided education: Yes  Instructed to continue following a diabetic low salt diet and taking medications as prescribed.  Patient verbalized understanding.        Social Determinants of Health (SDOH)    Patient's identified areas of need:    Mortgage, utility assistance, transportation, food, paying for medication   Education/Resources provided:    Yes      Home Health/DME:    Current Home Health: No  Patient has all healthcare equipment/supplies indicated: no  Current DME: none    Additional Documentation:   Identity verified.  Patient states she needs mortgage, utility assistance, food and transportation resources and help paying for medications.  Patient states she does not qualify for SNAP benefits.  Offered to provide resources for mortgage, utility assistance, food and transportation, patient accepted.  Patient verbalized understanding to all information and instructions.      Follow up:   Patient agrees to scheduled follow up call. Yes      "

## 2025-01-31 NOTE — LETTER
January 31, 2025    Anel Schmitt  [address] [address]             Dear Ms. Schmitt,      My name is Aminta Robb.  I am reaching out on behalf of [facility]s [facility] Patient Assistance Team after receiving a referral from your Provider inquiring about assistance with your medication.  We have reviewed your current medication list and/or insurance status. Unfortunately, The Pharmacy Patient Assistance Team is unable to assist at this time due to the following reasons      There are no Manufacture or Co-pay Savings Programs available for requested medication.               Sincerely,   Aminta HOLLOWAY @[phone]  Pharmacy Patient Assistance  [address] [place] [address]  [address] [place] [address]  Fax: [phone]  Email: [email or web address]

## 2025-02-03 ENCOUNTER — TELEPHONE (OUTPATIENT)
Dept: OTOLARYNGOLOGY | Facility: CLINIC | Age: 60
End: 2025-02-03
Payer: COMMERCIAL

## 2025-02-03 NOTE — TELEPHONE ENCOUNTER
----- Message from Chuyita He NP sent at 2/2/2025 11:34 AM CST -----  Allergy testing was negative. No need to see allergist at this time. I recommend returning to your neurology next for pressure/pain-headaches.

## 2025-02-04 ENCOUNTER — TELEPHONE (OUTPATIENT)
Dept: FAMILY MEDICINE | Facility: CLINIC | Age: 60
End: 2025-02-04

## 2025-02-04 ENCOUNTER — OFFICE VISIT (OUTPATIENT)
Dept: FAMILY MEDICINE | Facility: CLINIC | Age: 60
End: 2025-02-04
Payer: COMMERCIAL

## 2025-02-04 VITALS
DIASTOLIC BLOOD PRESSURE: 81 MMHG | RESPIRATION RATE: 18 BRPM | SYSTOLIC BLOOD PRESSURE: 136 MMHG | WEIGHT: 225.63 LBS | HEART RATE: 74 BPM | HEIGHT: 69 IN | TEMPERATURE: 98 F | OXYGEN SATURATION: 100 % | BODY MASS INDEX: 33.42 KG/M2

## 2025-02-04 DIAGNOSIS — E66.811 CLASS 1 OBESITY DUE TO EXCESS CALORIES WITH SERIOUS COMORBIDITY AND BODY MASS INDEX (BMI) OF 33.0 TO 33.9 IN ADULT: ICD-10-CM

## 2025-02-04 DIAGNOSIS — E66.09 CLASS 1 OBESITY DUE TO EXCESS CALORIES WITH SERIOUS COMORBIDITY AND BODY MASS INDEX (BMI) OF 33.0 TO 33.9 IN ADULT: ICD-10-CM

## 2025-02-04 DIAGNOSIS — F51.04 PSYCHOPHYSIOLOGICAL INSOMNIA: ICD-10-CM

## 2025-02-04 DIAGNOSIS — M79.672 LEFT FOOT PAIN: Primary | ICD-10-CM

## 2025-02-04 DIAGNOSIS — L84 CORN OR CALLUS: ICD-10-CM

## 2025-02-04 DIAGNOSIS — R73.03 PREDIABETES: ICD-10-CM

## 2025-02-04 DIAGNOSIS — E88.810 METABOLIC SYNDROME: ICD-10-CM

## 2025-02-04 PROCEDURE — 3079F DIAST BP 80-89 MM HG: CPT | Mod: CPTII,S$GLB,, | Performed by: STUDENT IN AN ORGANIZED HEALTH CARE EDUCATION/TRAINING PROGRAM

## 2025-02-04 PROCEDURE — G2211 COMPLEX E/M VISIT ADD ON: HCPCS | Mod: S$GLB,,, | Performed by: STUDENT IN AN ORGANIZED HEALTH CARE EDUCATION/TRAINING PROGRAM

## 2025-02-04 PROCEDURE — 1159F MED LIST DOCD IN RCRD: CPT | Mod: CPTII,S$GLB,, | Performed by: STUDENT IN AN ORGANIZED HEALTH CARE EDUCATION/TRAINING PROGRAM

## 2025-02-04 PROCEDURE — 3075F SYST BP GE 130 - 139MM HG: CPT | Mod: CPTII,S$GLB,, | Performed by: STUDENT IN AN ORGANIZED HEALTH CARE EDUCATION/TRAINING PROGRAM

## 2025-02-04 PROCEDURE — 99214 OFFICE O/P EST MOD 30 MIN: CPT | Mod: S$GLB,,, | Performed by: STUDENT IN AN ORGANIZED HEALTH CARE EDUCATION/TRAINING PROGRAM

## 2025-02-04 PROCEDURE — 3008F BODY MASS INDEX DOCD: CPT | Mod: CPTII,S$GLB,, | Performed by: STUDENT IN AN ORGANIZED HEALTH CARE EDUCATION/TRAINING PROGRAM

## 2025-02-04 PROCEDURE — 1160F RVW MEDS BY RX/DR IN RCRD: CPT | Mod: CPTII,S$GLB,, | Performed by: STUDENT IN AN ORGANIZED HEALTH CARE EDUCATION/TRAINING PROGRAM

## 2025-02-04 PROCEDURE — 99999 PR PBB SHADOW E&M-EST. PATIENT-LVL V: CPT | Mod: PBBFAC,,, | Performed by: STUDENT IN AN ORGANIZED HEALTH CARE EDUCATION/TRAINING PROGRAM

## 2025-02-04 RX ORDER — LIDOCAINE AND PRILOCAINE 25; 25 MG/G; MG/G
CREAM TOPICAL
Qty: 5 G | Refills: 11 | Status: SHIPPED | OUTPATIENT
Start: 2025-02-04

## 2025-02-04 RX ORDER — LIDOCAINE AND PRILOCAINE 25; 25 MG/G; MG/G
CREAM TOPICAL
Qty: 5 G | Refills: 11 | Status: SHIPPED | OUTPATIENT
Start: 2025-02-04 | End: 2025-02-04

## 2025-02-04 RX ORDER — ORAL SEMAGLUTIDE 3 MG/1
3 TABLET ORAL DAILY
Qty: 30 TABLET | Refills: 0 | Status: SHIPPED | OUTPATIENT
Start: 2025-02-04 | End: 2025-02-10 | Stop reason: SDUPTHER

## 2025-02-04 RX ORDER — TRAZODONE HYDROCHLORIDE 50 MG/1
50 TABLET ORAL NIGHTLY
Qty: 90 TABLET | Refills: 3 | Status: SHIPPED | OUTPATIENT
Start: 2025-02-04 | End: 2025-02-04

## 2025-02-04 RX ORDER — ORAL SEMAGLUTIDE 3 MG/1
3 TABLET ORAL DAILY
Qty: 30 TABLET | Refills: 0 | Status: SHIPPED | OUTPATIENT
Start: 2025-02-04 | End: 2025-02-04

## 2025-02-04 RX ORDER — TRAZODONE HYDROCHLORIDE 50 MG/1
50 TABLET ORAL NIGHTLY
Qty: 90 TABLET | Refills: 3 | Status: SHIPPED | OUTPATIENT
Start: 2025-02-04 | End: 2026-02-04

## 2025-02-04 NOTE — PROGRESS NOTES
1. Left foot pain  -     Discontinue: LIDOcaine-prilocaine (EMLA) cream; Apply topically as needed.  Dispense: 5 g; Refill: 11  -     LIDOcaine-prilocaine (EMLA) cream; Apply topically as needed.  Dispense: 5 g; Refill: 11    2. Class 1 obesity due to excess calories with serious comorbidity and body mass index (BMI) of 33.0 to 33.9 in adult  Overview:  Wt Readings from Last 3 Encounters:   02/04/25 0909 102.3 kg (225 lb 9.6 oz)   01/28/25 0759 102.9 kg (226 lb 13.7 oz)   01/16/25 1217 100.2 kg (221 lb)   On compounded glp1      Patient denies family history and personal history of thyroid cancer, pancreatic cancer or pancreatitis.       General weight loss/lifestyle modification strategies discussed: limit sugary drinks, exercise 3-5x per week  Informal exercise measures discussed, e.g. taking stairs instead of elevator.     hx of VSG      Orders:  -     Discontinue: semaglutide (RYBELSUS) 3 mg tablet; Take 1 tablet (3 mg total) by mouth once daily.  Dispense: 30 tablet; Refill: 0  -     semaglutide (RYBELSUS) 3 mg tablet; Take 1 tablet (3 mg total) by mouth once daily.  Dispense: 30 tablet; Refill: 0    3. Metabolic syndrome  Overview:  Hx of HTN, HLD and prediabetes     Orders:  -     Discontinue: semaglutide (RYBELSUS) 3 mg tablet; Take 1 tablet (3 mg total) by mouth once daily.  Dispense: 30 tablet; Refill: 0  -     semaglutide (RYBELSUS) 3 mg tablet; Take 1 tablet (3 mg total) by mouth once daily.  Dispense: 30 tablet; Refill: 0    4. Prediabetes  Overview:  Lab Results   Component Value Date    HGBA1C 5.3 10/09/2024     -current meds:   Diabetes Medications               semaglutide (RYBELSUS) 3 mg tablet Take 1 tablet (3 mg total) by mouth once daily.            -discussed the importance of limiting sugary drinks (sodas, juices, sweet teas) and participating in regular daily exercise 30 min 3-5 days weekly.   3-6 m follow up            Orders:  -     Discontinue: semaglutide (RYBELSUS) 3 mg tablet; Take 1  tablet (3 mg total) by mouth once daily.  Dispense: 30 tablet; Refill: 0  -     semaglutide (RYBELSUS) 3 mg tablet; Take 1 tablet (3 mg total) by mouth once daily.  Dispense: 30 tablet; Refill: 0    5. Psychophysiological insomnia  Overview:  Chronic hx  -has tried multiple OTC medication with minimal benefit  -discussed importance of good sleep hygiene practices    Has tried:  - ambien with minimal relief   - previously on trazodone, unsure why it was discontinued, would like to restart     Orders:  -     Discontinue: traZODone (DESYREL) 50 MG tablet; Take 1 tablet (50 mg total) by mouth every evening.  Dispense: 90 tablet; Refill: 3  -     Discontinue: traZODone (DESYREL) 50 MG tablet; Take 1 tablet (50 mg total) by mouth every evening.  Dispense: 90 tablet; Refill: 3  -     traZODone (DESYREL) 50 MG tablet; Take 1 tablet (50 mg total) by mouth every evening.  Dispense: 90 tablet; Refill: 3    6. [place] or callus  -     Ambulatory referral/consult to Podiatry; Future; Expected date: 02/11/2025         CORN/CALLUS ON LEFT BABY TOE:  - Considered 2nd opinion for recurring corn/callus on left 5th toe after previous bone removal surgery.  - Referred the patient to a podiatrist in [place] for this purpose.  - Discussed potential causes of corn/callus recurrence, such as friction from shoes.  - Prescribed Emla cream for topical application as needed for pain relief.  - Observed patient's shoes and suggested friction as a possible cause.  - Reviewed previous surgical intervention and its long-term effectiveness.    WEIGHT MANAGEMENT:  - Recommend exercise as part of weight management.  - Patient reports 25-pound weight gain since starting glp1 compounded  - Patient to resume regular walking routine for exercise.    MARITAL ISSUES:  - Patient reports marital issues due to suspected infidelity.      TETANUS VACCINATION:  - Tetanus shot administered during visit.    FOLLOW UP:  - Follow up in 6 weeks to assess medication  effectiveness.  - Contact the office if experiencing any issues with new medications before next appointment.            Irene Christie MD  _________________________________________________________________________      Patient ID: Anel Schmitt is a 59 y.o. female.    History of Present Illness    CHIEF COMPLAINT:  Patient presents today for follow up of left toe pain.    PODIATRIC HISTORY:  She has a history of bilateral 5th toe surgery with bone removal 10-15 years ago. She experiences a recurring corn/callus on her left baby toe that returns within days of removal by her podiatrist, causing significant discomfort.    DIABETES:  She is currently receiving compounded glp1 injections which have resulted in side effects including lightheadedness, increased appetite, and a 25-pound weight gain. She reports previous success with a different diabetes medication that resulted in weight loss but cannot access it due to insurance limitations. She has a family history significant for maternal death from diabetes complications.    PHYSICAL ACTIVITY:  She previously maintained daily morning walks but has discontinued this routine.    MENTAL HEALTH:  She reports anxiety related to marital issues and is currently in early divorce proceedings. She has had previous adverse reactions to anxiety medications. She was prescribed Trazodone in 2019 but does not recall the reason for discontinuation.          Past medical histories reviewed, including past medical, surgical, family and social histories.      Current Outpatient Medications on File Prior to Visit   Medication Sig Dispense Refill    amLODIPine (NORVASC) 2.5 MG tablet Take 1 tablet (2.5 mg total) by mouth once daily. 90 tablet 3    atorvastatin (LIPITOR) 40 MG tablet Take 1 tablet (40 mg total) by mouth once daily. 90 tablet 3    azelastine (ASTELIN) 137 mcg (0.1 %) nasal spray 1 spray (137 mcg total) by Nasal route 2 (two) times daily. 30 mL 3     clotrimazole-betamethasone 1-0.05% (LOTRISONE) cream Apply topically 2 (two) times daily. 45 g 0    diclofenac sodium (VOLTAREN) 1 % Gel Apply 2 g topically 2 (two) times daily as needed (for arthritis pain). 200 g 2    estradioL (ESTRACE) 0.01 % (0.1 mg/gram) vaginal cream APPLY 3 GRAMS  VAGINALLY TWICE A WEEK *THANK YOU* 42.5 g 11    fluorometholone 0.1% (FML) 0.1 % DrpS Place into both eyes.      hydroCHLOROthiazide (HYDRODIURIL) 12.5 MG Tab TAKE 1 TABLET DAILY *THANK YOU* 90 tablet 3    levalbuterol (XOPENEX) 1.25 mg/3 mL nebulizer solution Take 3 mLs (1.25 mg total) by nebulization every 6 (six) hours as needed for Wheezing. Rescue 3 mL 11    levocetirizine (XYZAL) 5 MG tablet Take 1 tablet (5 mg total) by mouth every evening. 90 tablet 3    linaCLOtide (LINZESS) 145 mcg Cap capsule Take 1 capsule (145 mcg total) by mouth before breakfast. 90 capsule 1    nystatin (MYCOSTATIN) powder Apply topically 2 (two) times daily. 60 g 11    olopatadine (PATANOL) 0.1 % ophthalmic solution Place 1 drop into both eyes 2 (two) times daily. 5 mL 1    pantoprazole (PROTONIX) 40 MG tablet Take 1 tablet (40 mg total) by mouth once daily. 90 tablet 3    pregabalin (LYRICA) 50 MG capsule Take 1 capsule (50 mg total) by mouth 2 (two) times daily. 90 capsule 11    simethicone (MYLICON) 125 mg Cap capsule Take 1 capsule (125 mg total) by mouth 4 (four) times daily as needed for Flatulence. 90 capsule 2    sumatriptan (IMITREX) 50 MG tablet Take 1 tablet (50 mg total) by mouth as needed for Migraine. 10 tablet 0    tobramycin-dexAMETHasone 0.3-0.1% (TOBRADEX) 0.3-0.1 % DrpS Place into both eyes.      triamcinolone acetonide 0.1% (KENALOG) 0.1 % cream Apply topically 2 (two) times daily. 28.4 g 1    [DISCONTINUED] zolpidem (AMBIEN) 5 MG Tab Take 1 tablet (5 mg total) by mouth nightly as needed. 30 tablet 2    oxybutynin (DITROPAN-XL) 5 MG TR24 Take 1 tablet (5 mg total) by mouth once daily. 90 tablet 3    rizatriptan (MAXALT-MLT) 10 MG  disintegrating tablet Take 1 tablet (10 mg total) by mouth as needed for Migraine. May repeat in 2 hours if needed 10 tablet 11     No current facility-administered medications on file prior to visit.       Review of Systems   12 point review of systems negative except for listed in HPI.     Objective:    Nursing note and vitals reviewed.  Vitals:    02/04/25 0909   BP: 136/81   Pulse: 74   Resp: 18   Temp: 97.8 °F (36.6 °C)     Body mass index is 33.32 kg/m².     Physical Exam   Constitutional: oriented to person, place, and time. well-developed and well-nourished. No distress.   HENT: WNL  Head: Normocephalic and atraumatic.   Eyes: EOM are normal.   Neck: Normal range of motion. Neck supple.   Cardiovascular: Normal rate  Pulmonary/Chest: Effort normal. No respiratory distress.   GI: soft, non distended, no ttp, no rebound/guarding  Musculoskeletal: Normal range of motion. no edema.   L 5th toe: callus to lateral toe  Neurological: CN II-XII intact  Skin: warm and dry.   Psychiatric: normal mood and affect. behavior is normal.             We Offer Telehealth & Same Day Appointments!   Book your Telehealth appointment with me through my nurse or   Clinic appointments on [employer]!  [phone]     To Schedule appointments online, go to [employer]: [email or web address]       Visit today included increased complexity associated with the care of the episodic problem addressed and managing the longitudinal care of the patient due to the serious and/or complex managed problem(s) as per problem list.     This note was generated with the assistance of ambient listening technology. Verbal consent was obtained by the patient and accompanying visitor(s) for the recording of patient appointment to facilitate this note. I attest to having reviewed and edited the generated note for accuracy, though some syntax or spelling errors may persist. Please contact the author of this note for any  clarification.

## 2025-02-04 NOTE — TELEPHONE ENCOUNTER
Please assist with PA   semaglutide (RYBELSUS) 3 mg tablet     Diagnoses  Class 1 obesity due to excess calories with serious comorbidity and body mass index (BMI) of 33.0 to 33.9 in adult [E66.811, E66.09, Z68.33]  Metabolic syndrome [E88.810]  Prediabetes [R73.03]

## 2025-02-04 NOTE — TELEPHONE ENCOUNTER
----- Message from Med Assistant Mclean sent at 2/4/2025  2:40 PM CST -----  Contact: Anel  Type:  Needing Return Call    Who Called: Aenl  Needs Call Back For: Regarding code for medication  Would the patient rather a call back or a response via [email or web address]?  Call  Best Call Back Number: [phone]  Additional Information:

## 2025-02-06 ENCOUNTER — TELEPHONE (OUTPATIENT)
Dept: FAMILY MEDICINE | Facility: CLINIC | Age: 60
End: 2025-02-06
Payer: COMMERCIAL

## 2025-02-06 NOTE — TELEPHONE ENCOUNTER
----- Message from Betito sent at 2/6/2025 12:15 PM CST -----  Contact: [phone]  Type:  RX Refill Request    Who Called: KIRBY STOCKTON [[id number]]  Refill or New Rx:refill  RX Name and Strength:semaglutide (RYBELSUS) 3 mg tablet  How is the patient currently taking it? (ex. 1XDay):  Is this a 30 day or 90 day RX:  Preferred Pharmacy with phone number:  Local or Mail Order:local  Ordering Provider:  Would the patient rather a call back or a response via [facility]? Call back  Best Call Back Number:[phone]  Additional Information: [id number] ... Need prior auth

## 2025-02-07 ENCOUNTER — PATIENT OUTREACH (OUTPATIENT)
Dept: ADMINISTRATIVE | Facility: OTHER | Age: 60
End: 2025-02-07
Payer: COMMERCIAL

## 2025-02-07 NOTE — PATIENT INSTRUCTIONS
[facility]  [phone]  [facility].org (There is a list of apartment complexes to contact for availability)    [place] [facility]  [phone]    [place] [facility]  [phone]    [facility]  [phone]    [facility]  [phone]    [facility] [place]   [address] [place] [address]  [phone]    [facility] [place]  [address]  [place] [address]  [phone]    [employer]  [address]  [place] [address]  [phone]    [facility]  [address] [facility]  [place] [address]  [phone]    [address] [place] [address]  [phone]    [address]   [place] [address]  [phone]   normal...

## 2025-02-07 NOTE — PROGRESS NOTES
[facility] - Follow Up    This LPN completed a follow up visit with patient via telephone today.  Pt/Caregiver reported: Identity verified.  BP, did not check.  Patient denies SI/HI.  She states she tried all the resources, but also states she has not gotten the resources mailed to her.  Patient requests assistance to find a place to live.  Offered to research resources, patient accepted.  Patient verbalized understanding.  LPN provided: mailed housing resources  Follow up required: Yes  Follow-up Outreach - Due: 2/28/2025

## 2025-02-10 DIAGNOSIS — E66.811 CLASS 1 OBESITY DUE TO EXCESS CALORIES WITH SERIOUS COMORBIDITY AND BODY MASS INDEX (BMI) OF 33.0 TO 33.9 IN ADULT: ICD-10-CM

## 2025-02-10 DIAGNOSIS — E66.09 CLASS 1 OBESITY DUE TO EXCESS CALORIES WITH SERIOUS COMORBIDITY AND BODY MASS INDEX (BMI) OF 33.0 TO 33.9 IN ADULT: ICD-10-CM

## 2025-02-10 DIAGNOSIS — E88.810 METABOLIC SYNDROME: ICD-10-CM

## 2025-02-10 DIAGNOSIS — R73.03 PREDIABETES: ICD-10-CM

## 2025-02-10 RX ORDER — ORAL SEMAGLUTIDE 3 MG/1
3 TABLET ORAL DAILY
Qty: 30 TABLET | Refills: 0 | Status: SHIPPED | OUTPATIENT
Start: 2025-02-10

## 2025-02-10 NOTE — TELEPHONE ENCOUNTER
----- Message from Bertin sent at 2/10/2025  1:13 PM CST -----  .Type: Patient Call Back        Who called:   patient      What is the request in detail:  called in concerning medication . Patient said that her insurance wont cover it and she wanted to know the next steps. Please call back     Can the clinic reply by [facility]?           Would the patient rather a call back or a response via [facility]?      call   Best call back number:  [phone]

## 2025-02-10 NOTE — TELEPHONE ENCOUNTER
Care Due:                  Date            Visit Type   Department     Provider  --------------------------------------------------------------------------------                                EP -                              PRIMARY      [facility]  Last Visit: 02-      CARE ([facility])   MEDICINE       Irene Christie  Next Visit: None Scheduled  None         None Found                                                            Last  Test          Frequency    Reason                     Performed    Due Date  --------------------------------------------------------------------------------    HBA1C.......  6 months...  semaglutide..............  10-   04-    [facility] Embedded Care Due Messages. Reference number: [id number].   2/10/2025 1:52:50 PM CST

## 2025-02-10 NOTE — TELEPHONE ENCOUNTER
Spoke with pt, stated that rx need to be sent to her mail order pharmacy due to cheaper cost. Rx pended.

## 2025-02-11 ENCOUNTER — HOSPITAL ENCOUNTER (OUTPATIENT)
Dept: RADIOLOGY | Facility: HOSPITAL | Age: 60
Discharge: HOME | End: 2025-02-11
Attending: PODIATRIST
Payer: COMMERCIAL

## 2025-02-11 ENCOUNTER — OFFICE VISIT (OUTPATIENT)
Dept: PODIATRY | Facility: CLINIC | Age: 60
End: 2025-02-11
Payer: COMMERCIAL

## 2025-02-11 VITALS — WEIGHT: 225.5 LBS | HEIGHT: 69 IN | BODY MASS INDEX: 33.4 KG/M2

## 2025-02-11 DIAGNOSIS — L84 CORN OR CALLUS: ICD-10-CM

## 2025-02-11 DIAGNOSIS — M20.5X2 ADDUCTOVARUS ROTATION OF TOE, ACQUIRED, LEFT: Primary | ICD-10-CM

## 2025-02-11 DIAGNOSIS — M20.5X2 ADDUCTOVARUS ROTATION OF TOE, ACQUIRED, LEFT: ICD-10-CM

## 2025-02-11 PROCEDURE — 73630 X-RAY EXAM OF FOOT: CPT | Mod: TC,PO,LT

## 2025-02-11 PROCEDURE — 1160F RVW MEDS BY RX/DR IN RCRD: CPT | Mod: CPTII,S$GLB,, | Performed by: PODIATRIST

## 2025-02-11 PROCEDURE — 99999 PR PBB SHADOW E&M-EST. PATIENT-LVL IV: CPT | Mod: PBBFAC,,, | Performed by: PODIATRIST

## 2025-02-11 PROCEDURE — 73630 X-RAY EXAM OF FOOT: CPT | Mod: 26,LT,, | Performed by: RADIOLOGY

## 2025-02-11 PROCEDURE — 1159F MED LIST DOCD IN RCRD: CPT | Mod: CPTII,S$GLB,, | Performed by: PODIATRIST

## 2025-02-11 PROCEDURE — 99213 OFFICE O/P EST LOW 20 MIN: CPT | Mod: S$GLB,,, | Performed by: PODIATRIST

## 2025-02-11 PROCEDURE — 3008F BODY MASS INDEX DOCD: CPT | Mod: CPTII,S$GLB,, | Performed by: PODIATRIST

## 2025-02-11 NOTE — PROGRESS NOTES
Subjective:      Patient ID: Anel Schmitt is a 60 y.o. female.    Chief Complaint: Emma Tam is a 60 y.o. female who presents to the podiatry clinic  with complaint of  left foot pain with the fifth toe associated with corn to the toe and pressure. Onset of the symptoms was several years ago. Precipitating event: none known. Current symptoms include: ability to bear weight, but with some pain. Aggravating factors:  shoes . Symptoms have gradually worsened. Patient has had prior foot problems. She has had the corn trimmed down but it comes right back, she had surgery on the toe a few years ago but it did not help    Review of Systems   Constitutional: Negative for chills and fever.   Cardiovascular:  Negative for claudication and leg swelling.   Respiratory:  Negative for shortness of breath.    Skin:  Negative for itching, nail changes and rash.   Musculoskeletal:  Negative for muscle cramps, muscle weakness and myalgias.   Gastrointestinal:  Negative for nausea and vomiting.   Neurological:  Negative for focal weakness, loss of balance, numbness and paresthesias.           Objective:      Physical Exam  Constitutional:       General: She is not in acute distress.     Appearance: She is well-developed. She is not diaphoretic.   Cardiovascular:      Pulses:           Dorsalis pedis pulses are 2+ on the right side and 2+ on the left side.        Posterior tibial pulses are 2+ on the right side and 2+ on the left side.      Comments: < 3 sec capillary refill time to toes 1-5 bilateral. Toes and feet are warm to touch proximally with normal distal cooling b/l. There is some hair growth on the feet and toes b/l. There is no edema b/l. No spider veins or varicosities present b/l.     Musculoskeletal:      Comments: Equinus noted b/l ankles with < 10 deg DF noted. MMT 5/5 in DF/PF/Inv/Ev resistance with no reproduction of pain in any direction. Passive range of motion of ankle and pedal joints is painless  b/l.    Left fifth toe non reducible adductovarus rotation with PIPJ contracture and dorsal PIPJ hyperkeratotic lesion with pain to palpation   Skin:     General: Skin is warm and dry.      Coloration: Skin is not pale.      Findings: No abrasion, bruising, burn, ecchymosis, erythema, laceration, lesion, petechiae or rash.      Nails: There is no clubbing.      Comments: Skin temperature, texture and turgor within normal limits.   Neurological:      Mental Status: She is alert and oriented to person, place, and time.      Sensory: No sensory deficit.      Motor: No tremor, atrophy or abnormal muscle tone.      Comments: Negative tinel sign bilateral.   Psychiatric:         Behavior: Behavior normal.               Assessment:       Encounter Diagnoses   Name Primary?    Corn or callus     Adductovarus rotation of toe, acquired, left Yes         Plan:       Anel was seen today for callouses.    Diagnoses and all orders for this visit:    Adductovarus rotation of toe, acquired, left  -     X-Ray Foot Complete Left; Future    [place] or callus  -     Ambulatory referral/consult to Podiatry  -     X-Ray Foot Complete Left; Future      I counseled the patient on her conditions, their implications and medical management.    Discussed offloading with wider shoes and toe sleeves, avoid tight fitting shoes. Recommended altra tennis shoes.    Discussed doing another hammertoe procedure with derotational arthroplasty, she would like this but not until this summer once she is done with some schooling    Return PRN when ready to set up procedure will get x-rays today    Sandeep Treadwell DPM

## 2025-02-13 NOTE — TELEPHONE ENCOUNTER
Anel Olivas Oskar has been enrolled in the Rybelsus Copay Card .  Patient received her co-pay card via  email .     Card Billing Information Is as follows:    [id number]  ID: [id number]  GROUP: [id number]  PCN: [id number]     The patient will pay as little as $10 for A 1-,2-or 3months supply unless the patient has a insurance plan with a high deductible. If she has a high deductible she will have to meet the deductible first.        Sincerely  Aminta Zamudio  Pharmacy Patient Assistance Team

## 2025-02-17 ENCOUNTER — OFFICE VISIT (OUTPATIENT)
Dept: FAMILY MEDICINE | Facility: CLINIC | Age: 60
End: 2025-02-17
Payer: COMMERCIAL

## 2025-02-17 VITALS
TEMPERATURE: 98 F | DIASTOLIC BLOOD PRESSURE: 82 MMHG | HEIGHT: 71 IN | BODY MASS INDEX: 31.46 KG/M2 | RESPIRATION RATE: 18 BRPM | WEIGHT: 224.69 LBS | OXYGEN SATURATION: 98 % | HEART RATE: 68 BPM | SYSTOLIC BLOOD PRESSURE: 120 MMHG

## 2025-02-17 DIAGNOSIS — H66.003 NON-RECURRENT ACUTE SUPPURATIVE OTITIS MEDIA OF BOTH EARS WITHOUT SPONTANEOUS RUPTURE OF TYMPANIC MEMBRANES: ICD-10-CM

## 2025-02-17 DIAGNOSIS — J01.00 ACUTE NON-RECURRENT MAXILLARY SINUSITIS: Primary | ICD-10-CM

## 2025-02-17 DIAGNOSIS — B37.9 YEAST INFECTION: ICD-10-CM

## 2025-02-17 RX ORDER — CEFDINIR 300 MG/1
300 CAPSULE ORAL 2 TIMES DAILY
Qty: 20 CAPSULE | Refills: 0 | Status: SHIPPED | OUTPATIENT
Start: 2025-02-17 | End: 2025-02-27

## 2025-02-17 RX ORDER — FLUCONAZOLE 150 MG/1
150 TABLET ORAL DAILY
Qty: 1 TABLET | Refills: 0 | Status: SHIPPED | OUTPATIENT
Start: 2025-02-17 | End: 2025-02-17

## 2025-02-17 RX ORDER — GUAIFENESIN AND DEXTROMETHORPHAN HYDROBROMIDE 1200; 60 MG/1; MG/1
1 TABLET, EXTENDED RELEASE ORAL 2 TIMES DAILY
Qty: 20 TABLET | Refills: 0 | Status: SHIPPED | OUTPATIENT
Start: 2025-02-17 | End: 2025-02-17

## 2025-02-17 RX ORDER — CEFDINIR 300 MG/1
300 CAPSULE ORAL 2 TIMES DAILY
Qty: 20 CAPSULE | Refills: 0 | Status: SHIPPED | OUTPATIENT
Start: 2025-02-17 | End: 2025-02-17

## 2025-02-17 RX ORDER — PROMETHAZINE HYDROCHLORIDE AND DEXTROMETHORPHAN HYDROBROMIDE 6.25; 15 MG/5ML; MG/5ML
5 SYRUP ORAL NIGHTLY PRN
Qty: 118 ML | Refills: 0 | Status: SHIPPED | OUTPATIENT
Start: 2025-02-17

## 2025-02-17 RX ORDER — PROMETHAZINE HYDROCHLORIDE AND DEXTROMETHORPHAN HYDROBROMIDE 6.25; 15 MG/5ML; MG/5ML
5 SYRUP ORAL NIGHTLY PRN
Qty: 118 ML | Refills: 0 | Status: SHIPPED | OUTPATIENT
Start: 2025-02-17 | End: 2025-02-17

## 2025-02-17 RX ORDER — GUAIFENESIN AND DEXTROMETHORPHAN HYDROBROMIDE 1200; 60 MG/1; MG/1
1 TABLET, EXTENDED RELEASE ORAL 2 TIMES DAILY
Qty: 20 TABLET | Refills: 0 | Status: SHIPPED | OUTPATIENT
Start: 2025-02-17 | End: 2025-02-27

## 2025-02-17 RX ORDER — FLUCONAZOLE 150 MG/1
150 TABLET ORAL DAILY
Qty: 1 TABLET | Refills: 0 | Status: SHIPPED | OUTPATIENT
Start: 2025-02-17 | End: 2025-02-18

## 2025-02-17 NOTE — PROGRESS NOTES
PLAN:    Assessment & Plan    IMPRESSION:  - Assessed patient with cough, runny nose, and sore throat; suspected viral infection with possible sinusitis and ear involvement  - Diagnosed maxillary sinusitis based on symptoms and exam  - Will prescribe antibiotics due to signs of bacterial infection in sinuses and ears  - Chose Omnicef (cephalosporin) due to patient's listed penicillin allergy, despite patient's report of only yeast infection as a side effect  - Considered patient's history of allergies and inability to use Flonase due to headaches    MAXILLARY SINUSITIS:  - Examined the patient and found evidence of maxillary sinusitis.  - Assessed the condition as maxillary sinusitis.  - Prescribed Omnicef (cephalosporin) 2 times daily for 10 days to treat sinusitis.  - Recommend continuing steroid treatment (Medrol pack).  - Patient reports coughing, rhinorrhea, and postnasal drip.    OTITIS MEDIA:  - Examined ears and found evidence of infection in both ears.  - Assessed the condition as otitis media related to sinusitis.  - Prescribed Omnicef (cephalosporin) 2 times daily for 10 days to treat bilateral ear infection.  - Noted that the patient's daughter has a history of ear issues.    ALLERGIES:  - Patient reports allergies and sneezing.  - Reviewed the patient's allergy history and medication intolerances.  - Noted that the patient is taking a Medrol pack for allergies.    SORE THROAT:  - Patient reports a sore throat.  - Examined the throat and found it erythematous.  - Attributed sore throat to postnasal drip.  - Recommend continuing steroid treatment to help with drainage.  - Explained that postnasal drip is likely causing the sore throat.    COUGH:  - Patient reports coughing for 3 days.  - Inquired about the nature of cough; the patient denies productive cough.  - Assessed cough as a main symptom.  - Prescribed promethazine DM for nighttime cough.    MEDICATIONS/SUPPLEMENTS:  - Prescribed Mucinex 2 times  daily to thin mucus.  - Patient to continue taking Medrol pack as prescribed.  - Started Diflucan (as needed) for potential yeast infection from antibiotic use.    OTHER INSTRUCTIONS:  - Discussed various viruses currently circulating, including RSV, rhinoviruses, and adenoviruses.  - Educated about potential red stool discoloration from Omnicef, emphasizing it is a normal side effect.    FOLLOW UP:  - Contact the office if needed.        Problem List Items Addressed This Visit       Sinusitis - Primary    Relevant Medications    cefdinir (OMNICEF) 300 MG capsule    dextromethorphan-guaiFENesin (MUCINEX DM) 60-1,200 mg per 12 hr tablet    promethazine-dextromethorphan (PROMETHAZINE-DM) 6.25-15 mg/5 mL Syrp     Other Visit Diagnoses         Yeast infection        Relevant Medications    fluconazole (DIFLUCAN) 150 MG Tab      Non-recurrent acute suppurative otitis media of both ears without spontaneous rupture of tympanic membranes              Future Appointments       Date Provider Specialty Appt Notes    2/25/2025 Sunshine Vega MD Dermatology Dx: Hyperpigmentation [L81.9]    3/20/2025 Katrina Hobbs NP Neurology 3 month f/u    3/26/2025 Jodee Aceves MD Allergy Seasonal allergic rhinitis due to pollen [J30.1]           Medication Management for assessment above:   Medication List with Changes/Refills   New Medications    CEFDINIR (OMNICEF) 300 MG CAPSULE    Take 1 capsule (300 mg total) by mouth 2 (two) times daily. for 10 days    DEXTROMETHORPHAN-GUAIFENESIN (MUCINEX DM) 60-1,200 MG PER 12 HR TABLET    Take 1 tablet by mouth 2 (two) times a day. for 10 days    FLUCONAZOLE (DIFLUCAN) 150 MG TAB    Take 1 tablet (150 mg total) by mouth once daily. for 1 day    PROMETHAZINE-DEXTROMETHORPHAN (PROMETHAZINE-DM) 6.25-15 MG/5 ML SYRP    Take 5 mLs by mouth nightly as needed (cough).   Current Medications    AMLODIPINE (NORVASC) 2.5 MG TABLET    Take 1 tablet (2.5 mg total) by mouth once daily.    ATORVASTATIN (LIPITOR)  40 MG TABLET    Take 1 tablet (40 mg total) by mouth once daily.    AZELASTINE (ASTELIN) 137 MCG (0.1 %) NASAL SPRAY    1 spray (137 mcg total) by Nasal route 2 (two) times daily.    CLOTRIMAZOLE-BETAMETHASONE 1-0.05% (LOTRISONE) CREAM    Apply topically 2 (two) times daily.    DICLOFENAC SODIUM (VOLTAREN) 1 % GEL    Apply 2 g topically 2 (two) times daily as needed (for arthritis pain).    ESTRADIOL (ESTRACE) 0.01 % (0.1 MG/GRAM) VAGINAL CREAM    APPLY 3 GRAMS  VAGINALLY TWICE A WEEK *THANK YOU*    FLUOROMETHOLONE 0.1% (FML) 0.1 % DRPS    Place into both eyes.    HYDROCHLOROTHIAZIDE (HYDRODIURIL) 12.5 MG TAB    TAKE 1 TABLET DAILY *THANK YOU*    LEVALBUTEROL (XOPENEX) 1.25 MG/3 ML NEBULIZER SOLUTION    Take 3 mLs (1.25 mg total) by nebulization every 6 (six) hours as needed for Wheezing. Rescue    LEVOCETIRIZINE (XYZAL) 5 MG TABLET    Take 1 tablet (5 mg total) by mouth every evening.    LIDOCAINE-PRILOCAINE (EMLA) CREAM    Apply topically as needed.    LINACLOTIDE (LINZESS) 145 MCG CAP CAPSULE    Take 1 capsule (145 mcg total) by mouth before breakfast.    NYSTATIN (MYCOSTATIN) POWDER    Apply topically 2 (two) times daily.    OLOPATADINE (PATANOL) 0.1 % OPHTHALMIC SOLUTION    Place 1 drop into both eyes 2 (two) times daily.    OXYBUTYNIN (DITROPAN-XL) 5 MG TR24    Take 1 tablet (5 mg total) by mouth once daily.    PANTOPRAZOLE (PROTONIX) 40 MG TABLET    Take 1 tablet (40 mg total) by mouth once daily.    PREGABALIN (LYRICA) 50 MG CAPSULE    Take 1 capsule (50 mg total) by mouth 2 (two) times daily.    RIZATRIPTAN (MAXALT-MLT) 10 MG DISINTEGRATING TABLET    Take 1 tablet (10 mg total) by mouth as needed for Migraine. May repeat in 2 hours if needed    SEMAGLUTIDE (RYBELSUS) 3 MG TABLET    Take 1 tablet (3 mg total) by mouth once daily.    SIMETHICONE (MYLICON) 125 MG CAP CAPSULE    Take 1 capsule (125 mg total) by mouth 4 (four) times daily as needed for Flatulence.    SUMATRIPTAN (IMITREX) 50 MG TABLET    Take 1  "tablet (50 mg total) by mouth as needed for Migraine.    TOBRAMYCIN-DEXAMETHASONE 0.3-0.1% (TOBRADEX) 0.3-0.1 % DRPS    Place into both eyes.    TRAZODONE (DESYREL) 50 MG TABLET    Take 1 tablet (50 mg total) by mouth every evening.    TRIAMCINOLONE ACETONIDE 0.1% (KENALOG) 0.1 % CREAM    Apply topically 2 (two) times daily.       Stella Knutson, DO, RDN  ==========================================================================  Subjective:   Patient ID: Anel Schmitt is a 60 y.o. female.  has a past medical history of GERD (gastroesophageal reflux disease) and Hypertension.   Chief Complaint: Cough (And sneezing for 3 days)      History of Present Illness    CHIEF COMPLAINT:  Patient presents today for cough and sore throat.    CURRENT SYMPTOMS:  She reports cough and sore throat for 3 days with runny nose. She denies fever, productive cough, GI symptoms, or fatigue.    ALLERGIES AND MEDICATION REACTIONS:  She experiences allergy symptoms including sneezing. She cannot take Flonase due to headaches as a side effect. She is listed as allergic to penicillin but clarifies it only causes yeast infections rather than a true allergic reaction. Tessalon pearls were ineffective for cough management.    CURRENT MEDICATIONS:  She started Medrol Dosepak yesterday.    COVID STATUS:  She denies having COVID-19.          Problem List Items Addressed This Visit       Sinusitis - Primary    Overview   Chronic hx, reports she has seen ENT: advised azelastine nasal spray with minimal relief  - cont prn nasal spray, H2 blocker  - routine follow up ENT          Other Visit Diagnoses         Yeast infection          Non-recurrent acute suppurative otitis media of both ears without spontaneous rupture of tympanic membranes                 Review of patient's allergies indicates:   Allergen Reactions    Codeine     Duloxetine Other (See Comments)     Pt stated cymbalta made her feel "crazy" in head. Pt stated she is unable to " describe it any other way.    Flonase [fluticasone] Other (See Comments)     Headache     Penicillins      Current Outpatient Medications   Medication Instructions    amLODIPine (NORVASC) 2.5 mg, Oral, Daily    atorvastatin (LIPITOR) 40 mg, Oral, Daily    azelastine (ASTELIN) 137 mcg, Nasal, 2 times daily    cefdinir (OMNICEF) 300 mg, Oral, 2 times daily    clotrimazole-betamethasone 1-0.05% (LOTRISONE) cream Topical (Top), 2 times daily    dextromethorphan-guaiFENesin (MUCINEX DM) 60-1,200 mg per 12 hr tablet 1 tablet, Oral, 2 times daily    diclofenac sodium (VOLTAREN) 2 g, Topical (Top), 2 times daily PRN    estradioL (ESTRACE) 0.01 % (0.1 mg/gram) vaginal cream APPLY 3 GRAMS  VAGINALLY TWICE A WEEK *THANK YOU*    fluconazole (DIFLUCAN) 150 mg, Oral, Daily    fluorometholone 0.1% (FML) 0.1 % DrpS Place into both eyes.    hydroCHLOROthiazide 12.5 mg    levalbuterol (XOPENEX) 1.25 mg, Nebulization, Every 6 hours PRN, Rescue    levocetirizine (XYZAL) 5 mg, Oral, Nightly    LIDOcaine-prilocaine (EMLA) cream Topical (Top), As needed (PRN)    linaCLOtide (LINZESS) 145 mcg, Oral, Before breakfast    nystatin (MYCOSTATIN) powder Topical (Top), 2 times daily    olopatadine (PATANOL) 0.1 % ophthalmic solution 1 drop, Both Eyes, 2 times daily    oxybutynin (DITROPAN-XL) 5 mg, Oral, Daily    pantoprazole (PROTONIX) 40 mg, Oral, Daily    pregabalin (LYRICA) 50 mg, Oral, 2 times daily    promethazine-dextromethorphan (PROMETHAZINE-DM) 6.25-15 mg/5 mL Syrp 5 mLs, Oral, Nightly PRN    rizatriptan (MAXALT-MLT) 10 mg, Oral, As needed (PRN), May repeat in 2 hours if needed    RYBELSUS 3 mg, Oral, Daily    simethicone (MYLICON) 125 mg, Oral, 4 times daily PRN    sumatriptan (IMITREX) 50 mg, Oral, As needed (PRN)    tobramycin-dexAMETHasone 0.3-0.1% (TOBRADEX) 0.3-0.1 % DrpS Place into both eyes.    traZODone (DESYREL) 50 mg, Oral, Nightly    triamcinolone acetonide 0.1% (KENALOG) 0.1 % cream Topical (Top), 2 times daily      I have  "reviewed the PMH, social history, FamilyHx, surgical history, allergies and medications documented / confirmed by the patient at the time of this visit.    Objective:   /82   Pulse 68   Temp 97.5 °F (36.4 °C)   Resp 18   Ht 5' 11" (1.803 m)   Wt 101.9 kg (224 lb 11.2 oz)   SpO2 98%   BMI 31.34 kg/m²   Physical Exam    General: No acute distress. Well-developed. Well-nourished.  Eyes: EOMI. Sclerae anicteric.  HENT: Normocephalic. Atraumatic. Nares patent. Moist oral mucosa. Erythematous throat. Tenderness over maxillary sinuses.  Ears: Suppurative effusion bilateral.   Cardiovascular: Regular rate. Regular rhythm. No murmurs. No rubs. No gallops. Normal S1, S2.  Respiratory: Normal respiratory effort. Clear to auscultation bilaterally. No rales. No rhonchi. No wheezing.  Abdomen: Soft. Non-tender. Non-distended. Normoactive bowel sounds.  Musculoskeletal: No  obvious deformity.  Extremities: No lower extremity edema.  Neurological: Alert & oriented x3. No slurred speech. Normal gait.  Psychiatric: Normal mood. Normal affect. Good insight. Good judgment.  Skin: Warm. Dry. No rash.          Assessment:     1. Acute non-recurrent maxillary sinusitis    2. Yeast infection    3. Non-recurrent acute suppurative otitis media of both ears without spontaneous rupture of tympanic membranes      MDM:   Acute condition with systemic symptoms requiring unique testing, interpretation of testing, medication management and review of previous medical records.     Visit today included increased complexity associated with the care of the episodic problem see above assessment addressed and managing the longitudinal care of the patient due to the serious and/or complex managed problem(s) see above.    I have reviewed and summarized old records.  I have performed thorough medication reconciliation today and discussed risk and benefits of medications.  I have reviewed labs and discussed with patient.  All questions were " answered.    I have signed for the following orders AND/OR meds.  No orders of the defined types were placed in this encounter.    Medications Ordered This Encounter   Medications    cefdinir (OMNICEF) 300 MG capsule     Sig: Take 1 capsule (300 mg total) by mouth 2 (two) times daily. for 10 days     Dispense:  20 capsule     Refill:  0    dextromethorphan-guaiFENesin (MUCINEX DM) 60-1,200 mg per 12 hr tablet     Sig: Take 1 tablet by mouth 2 (two) times a day. for 10 days     Dispense:  20 tablet     Refill:  0    fluconazole (DIFLUCAN) 150 MG Tab     Sig: Take 1 tablet (150 mg total) by mouth once daily. for 1 day     Dispense:  1 tablet     Refill:  0    promethazine-dextromethorphan (PROMETHAZINE-DM) 6.25-15 mg/5 mL Syrp     Sig: Take 5 mLs by mouth nightly as needed (cough).     Dispense:  118 mL     Refill:  0        Follow up if symptoms worsen or fail to improve.  Future Appointments       Date Provider Specialty Appt Notes    2/25/2025 Sunshine Vega MD Dermatology Dx: Hyperpigmentation [L81.9]    3/20/2025 Katrina Hobbs NP Neurology 3 month f/u    3/26/2025 Jodee Aceves MD Allergy Seasonal allergic rhinitis due to pollen [J30.1]            If no improvement in symptoms or symptoms worsen, advised to call/follow-up at clinic or go to ER. Patient voiced understanding and all questions/concerns were addressed.     DISCLAIMER: This note was compiled by using a speech recognition dictation system and therefore please be aware that typographical / speech recognition errors can and do occur.  Please contact me if you see any errors specifically.     This note was generated with the assistance of ambient listening technology. Verbal consent was obtained by the patient and accompanying visitor(s) for the recording of patient appointment to facilitate this note. I attest to having reviewed and edited the generated note for accuracy, though some syntax or spelling errors may persist. Please contact the author of  this note for any clarification.      Stella Knutson DO, RDN    Office: [phone] [address] [place] [address]  FAX: [phone]

## 2025-02-19 ENCOUNTER — TELEPHONE (OUTPATIENT)
Dept: FAMILY MEDICINE | Facility: CLINIC | Age: 60
End: 2025-02-19
Payer: COMMERCIAL

## 2025-02-19 DIAGNOSIS — E88.810 METABOLIC SYNDROME: ICD-10-CM

## 2025-02-19 RX ORDER — SEMAGLUTIDE 1.7 MG/.75ML
1.7 INJECTION, SOLUTION SUBCUTANEOUS WEEKLY
Qty: 10 ML | Refills: 6 | Status: SHIPPED | OUTPATIENT
Start: 2025-02-19

## 2025-02-19 NOTE — TELEPHONE ENCOUNTER
Refill Routing Note   Medication(s) are not appropriate for processing by [facility] for the following reason(s):        No active prescription written by provider    [facility] action(s):  Defer               Appointments  past 12m or future 3m with PCP    Date Provider   Last Visit   2/4/2025 Irene Christie MD   Next Visit   Visit date not found Irene Christie MD   ED visits in past 90 days: 0        Note composed:1:31 PM 02/19/2025

## 2025-02-19 NOTE — TELEPHONE ENCOUNTER
----- Message from Og sent at 2/19/2025 10:51 AM CST -----  Contact: KIRBY STOCKTON [[id number]]  ..Type:  Patient Requesting CallWho Called:KIRBY STOCKTON [[id number]]Does the patient know what this is regarding?:prior authorization for semaglutide (RYBELSUS) 3 mg tabletWould the patient rather a call back or a response via [facility]? [facility] Call Back Number:[phone] (home) Additional Information: need medication change to the cheaper one.[facility]  need prior authorization Yoanna, called patient and told her they can't fill her medication without the prior authorization $1700.00 for Rybelsus to get refilled.

## 2025-02-19 NOTE — TELEPHONE ENCOUNTER
No care due was identified.  [facility] Embedded Care Due Messages. Reference number: [id number].   2/19/2025 11:13:11 AM CST

## 2025-02-20 ENCOUNTER — TELEPHONE (OUTPATIENT)
Dept: FAMILY MEDICINE | Facility: CLINIC | Age: 60
End: 2025-02-20
Payer: COMMERCIAL

## 2025-02-20 NOTE — TELEPHONE ENCOUNTER
----- Message from Deb sent at 2025 10:42 AM CST -----  Tried doing pa for rybelsus again and got the following message:    There is a problem finding your patient. Please confirm the patient name, , gender, and zip code was correctly entered, and click Check Eligibility to try again.So I added the middle initial and got this message:   No eligibility was found. Please reconfirm the member's health plan coverage before re-submitting.I sent a previous message that pt will need to update her information.  I called the pharmacy and they have the same insurance information we do.  Something is needing an update so this will process

## 2025-02-20 NOTE — TELEPHONE ENCOUNTER
----- Message from Deb sent at 2025 11:02 AM CST -----  Tried to switch name and got the same mesageNo eligibility was found. Please reconfirm the member's health plan coverage before re-submittingCan you try under Anel Olivas. I think this is what we had to do in the past [facility]  25 10:46 AMNote----- Message from Deb sent at 2025 10:42 AM CST -----Tried doing pa for rybelsus again and got the following message:    There is a problem finding your patient. Please confirm the patient name, , gender, and zip code was correctly entered, and click Check Eligibility to try again.So I added the middle initial and got this message:   No eligibility was found. Please reconfirm the member's health plan coverage before re-submitting.I sent a previous message that pt will need to update her information.  I called the pharmacy and they have the same insurance information we do.  Something is needing an update so this will process

## 2025-02-24 ENCOUNTER — TELEPHONE (OUTPATIENT)
Dept: FAMILY MEDICINE | Facility: CLINIC | Age: 60
End: 2025-02-24
Payer: COMMERCIAL

## 2025-02-24 DIAGNOSIS — B00.1 FEVER BLISTER: Primary | ICD-10-CM

## 2025-02-24 RX ORDER — VALACYCLOVIR HYDROCHLORIDE 1 G/1
2000 TABLET, FILM COATED ORAL EVERY 12 HOURS
Qty: 4 TABLET | Refills: 0 | Status: SHIPPED | OUTPATIENT
Start: 2025-02-24 | End: 2026-02-24

## 2025-02-24 NOTE — TELEPHONE ENCOUNTER
Medications Ordered This Encounter   Medications    valACYclovir (VALTREX) 1000 MG tablet     Sig: Take 2 tablets (2,000 mg total) by mouth every 12 (twelve) hours. For one day     Dispense:  4 tablet     Refill:  0

## 2025-02-24 NOTE — TELEPHONE ENCOUNTER
I spoke with the patient about this. Pt requesting medication be sent in for fever blister. Please advise

## 2025-02-24 NOTE — TELEPHONE ENCOUNTER
----- Message from Jolanta sent at 2/24/2025  9:00 AM CST -----  Contact: Anel  Type:  Needs Medical AdviceWho Called: AnnieSymptoms (please be specific): Cold sore under noseHow long has patient had these symptoms:  Approximately 2-3 days [facility] [address] [place] [address]: [phone] the patient rather a call back or a response via [facility]? [facility] Call Back Number: [phone] Additional Information: Patient reports experiencing a cold sore under nose and request to receive medication. Thank you,GH

## 2025-02-28 ENCOUNTER — PATIENT OUTREACH (OUTPATIENT)
Dept: ADMINISTRATIVE | Facility: OTHER | Age: 60
End: 2025-02-28
Payer: COMMERCIAL

## 2025-02-28 DIAGNOSIS — F32.A ANXIETY AND DEPRESSION: Primary | ICD-10-CM

## 2025-02-28 DIAGNOSIS — F41.9 ANXIETY AND DEPRESSION: Primary | ICD-10-CM

## 2025-02-28 NOTE — PROGRESS NOTES
CHW - Follow Up    This LPN completed a follow up visit with patient via telephone today.  Pt/Caregiver reported: Identity verified.  Patient states she received the resources mailed to her, but she was not able to get any assistance.  Informed patient there are no other resources available at this time.  Patient states she needs a counselor.  Offered to send a message to PCP, patient accepted. Patient verbalized understanding to all information and instructions.  LPN provided: Sent In Basket message to PCP requesting a referral to a counselor.  Sent Secure Chat to PCP office informing them that copy of patient insurance card was scanned 1/16/25 and the name on the card is Anel Geronimo to assist with getting a PA on [facility].  Follow up required: Yes  Follow-up Outreach - Due: 3/14/2025

## 2025-03-10 RX ORDER — MUPIROCIN 20 MG/G
OINTMENT TOPICAL 2 TIMES DAILY
Qty: 30 G | Refills: 1 | Status: SHIPPED | OUTPATIENT
Start: 2025-03-10 | End: 2025-03-10 | Stop reason: SDUPTHER

## 2025-03-10 RX ORDER — MUPIROCIN 20 MG/G
OINTMENT TOPICAL 2 TIMES DAILY
Qty: 30 G | Refills: 1 | Status: SHIPPED | OUTPATIENT
Start: 2025-03-10 | End: 2025-03-20

## 2025-03-10 NOTE — TELEPHONE ENCOUNTER
No orders of the defined types were placed in this encounter.      Medications Ordered This Encounter   Medications    mupirocin (BACTROBAN) 2 % ointment     Sig: Apply topically 2 (two) times daily. for 10 days     Dispense:  30 g     Refill:  1

## 2025-03-10 NOTE — TELEPHONE ENCOUNTER
----- Message from Chevynychang sent at 3/10/2025  1:41 PM CDT -----  Contact: [phone]  Type:  Needs Medical AdviceWho Called: KIRBY STOCKTON [[id number]]Symptoms (please be specific): need to talk to the nurse about getting something called in , she said she been wiping  her back side , and from the tissue if has like raw and burning feeling How long has patient had these symptoms:  2 days Pharmacy name and phone #:  Would the patient rather a call back or a response via [facility]? Call [facility] Call Back Number: [phone] Information: n [phone]

## 2025-03-14 ENCOUNTER — TELEPHONE (OUTPATIENT)
Dept: PSYCHIATRY | Facility: CLINIC | Age: 60
End: 2025-03-14
Payer: COMMERCIAL

## 2025-03-14 NOTE — TELEPHONE ENCOUNTER
Called and spoke to pt and explained she is on wait list and we will call when her name is up next on list and get her scheduled with a therapist. Also she verified her address so can mail resource list to her.   She will await the call and list and much appreciated

## 2025-03-14 NOTE — TELEPHONE ENCOUNTER
Called pt to speak to her about her being on the wait list and sending her a resource list.  No answer left message to call office [phone] opt [facility] and I will send my chart portal message of being on wait list and sending attachment of the resource list as well.

## 2025-03-14 NOTE — TELEPHONE ENCOUNTER
----- Message from Mena sent at 3/12/2025 10:33 AM CDT -----  Regarding: RE: referral to Bijal Jackson  Good morning! Our wait here on the [facility] is about the same or longer. Right now we don't have anyone to schedule for individual therapy, only offering group therapy, psychotherapy options and all have an extensive WL. Pt should be able to be offered the option for external therapy resources in [facility].Thanks,Vee  ----- Message -----  From: Heavenly Melendez MA  Sent: 3/7/2025   9:49 AM CDT  To: Mena Mendez  Subject: referral to Bijal Jackson                         Good Morning,This patient has been put on our wait list to get scheduled with Bijal Jackson.She will be contacted to schedule visit as soon as available.It may be from a month to 5 months before we can get her scheduled.Thanks Heavenly [facility] [address]Uezlf: [phone] option 3Fax: [phone]

## 2025-03-17 ENCOUNTER — PATIENT OUTREACH (OUTPATIENT)
Dept: ADMINISTRATIVE | Facility: OTHER | Age: 60
End: 2025-03-17
Payer: COMMERCIAL

## 2025-03-17 NOTE — PROGRESS NOTES
"CHW - Follow Up    This LPN completed a follow up visit with patient via telephone today.  Pt/Caregiver reported: Identity verified.  Patient states she is doing well.  BP "it's hanging in there."  Patient denies SI/HI.  Patient states she is on the waiting list for an appointment with Bijal Jackson LCSW.  Patient denies any new needs.    LPN provided: None  Follow up required: Yes  Follow-up Outreach - Due: 4/7/2025      "

## 2025-03-20 ENCOUNTER — OFFICE VISIT (OUTPATIENT)
Dept: NEUROLOGY | Facility: CLINIC | Age: 60
End: 2025-03-20
Payer: COMMERCIAL

## 2025-03-20 VITALS
BODY MASS INDEX: 31.67 KG/M2 | HEART RATE: 85 BPM | HEIGHT: 71 IN | TEMPERATURE: 97 F | DIASTOLIC BLOOD PRESSURE: 80 MMHG | WEIGHT: 226.19 LBS | SYSTOLIC BLOOD PRESSURE: 132 MMHG | RESPIRATION RATE: 17 BRPM

## 2025-03-20 DIAGNOSIS — G43.909 MIGRAINE WITHOUT STATUS MIGRAINOSUS, NOT INTRACTABLE, UNSPECIFIED MIGRAINE TYPE: Primary | ICD-10-CM

## 2025-03-20 PROCEDURE — 99999 PR PBB SHADOW E&M-EST. PATIENT-LVL V: CPT | Mod: PBBFAC,,, | Performed by: NURSE PRACTITIONER

## 2025-03-20 RX ORDER — RIZATRIPTAN BENZOATE 10 MG/1
TABLET ORAL
Qty: 10 TABLET | Refills: 11 | Status: SHIPPED | OUTPATIENT
Start: 2025-03-20

## 2025-03-20 NOTE — PATIENT INSTRUCTIONS
Please call our clinic at [phone] or send a message on the [facility] portal if there are any changes to the plan described below, for example,if you are not contacted for the requested tests, referral(s) within one week, if you are unable to receive the medications prescribed, or if you feel you need to change the treatment course for any reason.     TESTING: none     REFERRALS: none    PREVENTION (use daily regardless of headache):  -  Continue Magnesium in ONE of the following preparations -               1. Magnesium oxide 800mg nightly (the most common over the counter kind, may causes loose stools)              2. Magnesium citrate 400-500mg nightly (harder to find, but more neutral on the bowels)              3. Magnesium glycinate 400mg nightly (hardest to find, look online, but most bowel-neutral, best absorbed)   - Consider in the future (Topamax per patient request)     AS-NEEDED TREATMENT (use total no more than 10 days per month unless otherwise stated):  - Stop Imitrex - no longer effective  - Continue Maxalt as needed for escalations   - Consider Fioricet in the future or CGPR    OTHER:   - Headache journal
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
## 2025-03-27 ENCOUNTER — TELEPHONE (OUTPATIENT)
Dept: FAMILY MEDICINE | Facility: CLINIC | Age: 60
End: 2025-03-27
Payer: COMMERCIAL

## 2025-03-27 ENCOUNTER — TELEPHONE (OUTPATIENT)
Dept: NEUROLOGY | Facility: CLINIC | Age: 60
End: 2025-03-27
Payer: COMMERCIAL

## 2025-03-27 DIAGNOSIS — G43.909 MIGRAINE WITHOUT STATUS MIGRAINOSUS, NOT INTRACTABLE, UNSPECIFIED MIGRAINE TYPE: Primary | ICD-10-CM

## 2025-03-27 NOTE — TELEPHONE ENCOUNTER
Pt report the Rizatriptan prescribed has not relieved her headaches in the past 5 days.  She was told to call the clinic if the medication did not work to relief the headaches.  Informed pt will send a message to her Provider.  If she need relief tonight it would be ok to take Tylenol as directed for relief. Provider may not return message till tomorrow.

## 2025-03-27 NOTE — TELEPHONE ENCOUNTER
----- Message from Tricia sent at 3/27/2025  8:05 AM CDT -----  Name of Caller:Meg Olivas Schmitt Symptoms:HeadacheBest Call Back Number:..[phone]   Additional Information: Patient was advised to call if the headache medication didn't work. She is requesting a call back because the medication isn't working. x EL

## 2025-03-27 NOTE — TELEPHONE ENCOUNTER
----- Message from Rey sent at 3/27/2025  1:07 PM CDT -----  Contact: Crystal  .Type:  Patient Requesting CallWho Called: AnneDoes the patient know what this is regarding?: pt wants to discuss medication with nurseWould the patient rather a call back or a response via [facility]? callBest Call Back Number: .[phone] (home) Additional Information:

## 2025-03-27 NOTE — TELEPHONE ENCOUNTER
I spoke with the patient about this. Pt requesting a call on her mobile phone regarding her headache. Thank you

## 2025-03-28 ENCOUNTER — PATIENT MESSAGE (OUTPATIENT)
Dept: NEUROLOGY | Facility: CLINIC | Age: 60
End: 2025-03-28
Payer: COMMERCIAL

## 2025-03-28 RX ORDER — ELETRIPTAN HYDROBROMIDE 40 MG/1
TABLET, FILM COATED ORAL
Qty: 10 TABLET | Refills: 11 | Status: SHIPPED | OUTPATIENT
Start: 2025-03-28

## 2025-03-31 ENCOUNTER — TELEPHONE (OUTPATIENT)
Dept: NEUROLOGY | Facility: CLINIC | Age: 60
End: 2025-03-31
Payer: COMMERCIAL

## 2025-03-31 NOTE — TELEPHONE ENCOUNTER
----- Message from Clementina sent at 3/31/2025 11:56 AM CDT -----  Contact: pt [phone]  Type: Needs Medical AdviceWho Called:  Pt Best Call Back Number: [phone] Information: Pt stated she has not heard back from office about about a nex rx for headache meds. Pt not able to log into [facility]. Pls call back and advise

## 2025-04-02 ENCOUNTER — OCHSNER VIRTUAL EMERGENCY DEPARTMENT (OUTPATIENT)
Facility: CLINIC | Age: 60
End: 2025-04-02
Payer: COMMERCIAL

## 2025-04-02 ENCOUNTER — PATIENT OUTREACH (OUTPATIENT)
Facility: OTHER | Age: 60
End: 2025-04-02
Payer: COMMERCIAL

## 2025-04-02 ENCOUNTER — TELEPHONE (OUTPATIENT)
Dept: NEUROLOGY | Facility: CLINIC | Age: 60
End: 2025-04-02
Payer: COMMERCIAL

## 2025-04-02 ENCOUNTER — NURSE TRIAGE (OUTPATIENT)
Dept: ADMINISTRATIVE | Facility: CLINIC | Age: 60
End: 2025-04-02
Payer: COMMERCIAL

## 2025-04-02 DIAGNOSIS — G43.709 CHRONIC MIGRAINE WITHOUT AURA WITHOUT STATUS MIGRAINOSUS, NOT INTRACTABLE: Primary | ICD-10-CM

## 2025-04-02 DIAGNOSIS — T50.905A ADVERSE EFFECT OF DRUG, INITIAL ENCOUNTER: Primary | ICD-10-CM

## 2025-04-02 NOTE — TELEPHONE ENCOUNTER
Anel c/o skin and scalp itching, burning and tingling sensation to bilteral arms, legs and head. Denies obvious rash, SOB, chest pain at this time. Pt reports symptoms have been present for two days and began after taking newly prescribed Relpax. Pt also reports Relpax causes extreme sleepiness and drowsiness. 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 Request Relpax be changed to a different medication. Pt states she [facility] this morning for symptoms, was prescribed a scalp shampoo which she has not used yet. Needs to know what she can do or take for skin itching that does not cause drowsiness. Advised Anel per triage protocol, discuss with provider and call back by office nurse today. Referred to [facility]. Advised Anel per Dr. Jennifer Flaherty, [facility] OCP - Relpax is a triptan which is a class of migraine medication that will need to be changed by patient's provider. If skin itching is from an allergic reaction, Benadryl can help but can cause drowsiness. Patient can try taking pepcid, which does not cause drowsiness, but it may not work either. Same with claritin or zyrtec. Anel v/u. Informed Anel that a high priority message will be sent to NP Katrina Hobbs office now with request to contact pt asap today. Home care and call back instructions provided for the mean time. Anel v/u.   Reason for Disposition   Taking prescription medication that could cause itching (e.g., codeine/morphine/other opiates, aspirin)    Additional Information   Negative: Life-threatening reaction (anaphylaxis) in the past to similar substance (e.g., food, insect bite/sting, chemical, etc.) and < 2 hours since exposure   Negative: Difficulty breathing or wheezing   Negative: Difficulty swallowing or slurred speech and sudden onset   Negative: Sounds like a life-threatening emergency to the triager   Negative: Severe allergic reaction suspected   Negative: Insect bites suspected   Negative: Hives suspected (urticaria,  itchy pink bumps with pale centers that come and go over minutes to hours)   Negative: Widespread rash also present   Negative: Itching in just one area or spot   Negative: Patient sounds very sick or weak to the triager   Negative: MODERATE to SEVERE widespread itching (i.e., interferes with sleep, normal activities or school) and not improved after 24 hours of itching Care Advice   Negative: MODERATE to SEVERE widespread itching (i.e., interferes with sleep, normal activities or school) AND pregnant   Negative: Patient wants to be seen   Negative: MILD widespread itching AND pregnant   Negative: Hand or foot itching AND pregnant    Protocols used: Itching - Widespread-A-OH

## 2025-04-02 NOTE — TELEPHONE ENCOUNTER
Message sent to Provider, Katrina Hobbs NP Spoke with patient, she did not take the Benadryl as suggested because of further causing drowsiness.  She reports she continues to have itching arms, legs and back, with no evidence of redness.  Informed patient I will discuss with provider for advisement and call her back.  Pt verbalized understanding.

## 2025-04-02 NOTE — PLAN OF CARE-OVED
[facility] Plan of Care Note  Referral Source: Nurse On-Call                               Chief Complaint   Patient presents with    Medication Problem     Taking relpax for 2d, with tingling all over, scalp issues, itchiness. Does not want to take anything that will cause drowsiness. Wants to switch migraine med and something for itchiness. No airway issues       Recommendation: Specialist Referral         Specialty: Neurology             Recommendation comment: can't change triptan. can offer fioricet; pepcid, claritin/zyrtec for itchiness. reportedly pt hung up saying she will wait for NP to call her back. no meds rx'd.    Encounter Diagnosis   Name Primary?    Adverse effect of drug, initial encounter Yes

## 2025-04-02 NOTE — PROGRESS NOTES
"Patient spoke with OOC RN on 4/2/2025 with complaint of "c/o skin and scalp itching, burning and tingling sensation to bilteral arms, legs and head. Denies obvious rash, SOB, chest pain at this time. Pt reports symptoms have been present for two days and began after taking newly prescribed Relpax. Pt also reports Relpax causes extreme sleepiness and drowsiness. 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 Request Relpax be changed to a different medication. Pt states she saw a non[facility] dermatologist this morning for symptoms, was prescribed a scalp shampoo which she has not used yet. Needs to know what she can do or take for skin itching that does not cause drowsiness."    OOC RN consulted [facility] provider, Dr. Flaherty, and disposition recommended was specialty/neurology follow up.  ZHAO Flaherty also provided the following recommendations, "can't change triptan. can offer fioricet; pepcid, claritin/zyrtec for itchiness. reportedly pt hung up saying she will wait for NP to call her back. no meds rx'd."  Patient instructed by OOC RN a high priority message would be sent to patient's neurology provider for further assistance.    Will follow up with patient to assess for any additional concerns to be addressed.    "

## 2025-04-03 ENCOUNTER — PATIENT MESSAGE (OUTPATIENT)
Dept: NEUROLOGY | Facility: CLINIC | Age: 60
End: 2025-04-03
Payer: COMMERCIAL

## 2025-04-03 RX ORDER — FROVATRIPTAN SUCCINATE 2.5 MG/1
2.5 TABLET, FILM COATED ORAL 3 TIMES DAILY PRN
Qty: 10 TABLET | Refills: 11 | Status: SHIPPED | OUTPATIENT
Start: 2025-04-03

## 2025-04-04 ENCOUNTER — PATIENT OUTREACH (OUTPATIENT)
Facility: OTHER | Age: 60
End: 2025-04-04
Payer: COMMERCIAL

## 2025-04-04 ENCOUNTER — TELEPHONE (OUTPATIENT)
Dept: NEUROLOGY | Facility: CLINIC | Age: 60
End: 2025-04-04
Payer: COMMERCIAL

## 2025-04-04 NOTE — PROGRESS NOTES
Spoke with patient to assess for any new needs or concerns to be addressed.  Patient reported head still hurting and neurologist had not prescribed any medication.  Patient advised neurologist sent her a message via her portal on yesterday informing previous medication stopped and a new medication ordered.  Patient also advised to follow up with neurologist in the event have side effects.  Patient relieved and stated she had not checked her medication.  Per chart review, patient advised frovatriptan (FROVA) 2.5 MG tablet was called in to [facility], [facility] [address] [place] [address], [place].    Patient appreciative for the update and denied no other concerns at this time.

## 2025-04-04 NOTE — TELEPHONE ENCOUNTER
PA initiated----- Message from Margo Kaufman sent at 4/4/2025  1:02 PM CDT -----  Type: Calling to Clarify an RXName of Caller:KIRBY STOCKTON [[id number]]Pharmacy Name:[facility] [place] - [place] [address] Phone: [phone]: [phone] Name:frovatriptan (FROVA) 2.5 MG tabletWhat do they need to clarify?: prior authorization Best Call Back Number:[phone] Additional Information: patient called in to speak with someone in the office regarding a prior authorization for the medication that was called in on 4/3. Patient states she would like a callback as soon as possible.   No

## 2025-04-08 ENCOUNTER — PATIENT OUTREACH (OUTPATIENT)
Dept: ADMINISTRATIVE | Facility: OTHER | Age: 60
End: 2025-04-08
Payer: COMMERCIAL

## 2025-04-16 ENCOUNTER — PATIENT OUTREACH (OUTPATIENT)
Dept: ADMINISTRATIVE | Facility: OTHER | Age: 60
End: 2025-04-16
Payer: COMMERCIAL

## 2025-04-17 ENCOUNTER — TELEPHONE (OUTPATIENT)
Dept: NEUROLOGY | Facility: CLINIC | Age: 60
End: 2025-04-17
Payer: COMMERCIAL

## 2025-04-17 NOTE — TELEPHONE ENCOUNTER
----- Message from Danis sent at 4/17/2025  2:43 PM CDT -----  Contact: [facility]  Type: Needs Medical AdviceWho Called:  [facility] Call Back Number:  Additional Information: Calling regarding PA on frovatriptan (FROVA) 2.5 MG tablet States PA approved REF [id number]

## 2025-04-23 ENCOUNTER — PATIENT OUTREACH (OUTPATIENT)
Dept: ADMINISTRATIVE | Facility: OTHER | Age: 60
End: 2025-04-23
Payer: COMMERCIAL

## 2025-04-23 ENCOUNTER — TELEPHONE (OUTPATIENT)
Dept: PHARMACY | Facility: CLINIC | Age: 60
End: 2025-04-23
Payer: COMMERCIAL

## 2025-04-23 NOTE — PROGRESS NOTES
CHW - Follow Up    This LPN completed a follow up visit with patient via telephone today.  Pt/Caregiver reported: Identity verified.  Patient denies SI/HI.  She states she is still on the waiting list for a counselor.  Instructed if she needs someone to speak with to call [phone].  Patient states she did not check her BP today.  Instructed to continue following a low salt diet and taking medications as prescribed. Patient states she cannot afford Frova.  Offered to refer to [facility] Patient Assistance Team, patient accepted.  Patient verbalized understanding to all information and instructions.  LPN provided: referral to [facility] Patient Assistance [facility]  Follow up required: Yes  Follow-up Outreach - Due: 5/14/2025

## 2025-04-23 NOTE — TELEPHONE ENCOUNTER
Currently we are unable to assist with PAP enrollment for the following reason(s):      There are currently no manufacture or co-pay savings programs available for requested medication based on the prescreened information listed in the chart  and The Pharmacy Patient Assistance Team does not assist with prior authorizations. Please send a new prescription to [facility] to initiate the prior authorization and follow up with the Prior Authorization Team            Aminta HOLLOWAY @[phone]  Pharmacy Patient Assistance Team

## 2025-04-25 ENCOUNTER — TELEPHONE (OUTPATIENT)
Dept: PODIATRY | Facility: CLINIC | Age: 60
End: 2025-04-25
Payer: COMMERCIAL

## 2025-04-25 NOTE — TELEPHONE ENCOUNTER
----- Message from Enrique sent at 4/25/2025 11:32 AM CDT -----  Type:  Needs Medical AdviceWho Called: KIRBY STOCKTON [[id number]]Symptoms (please be specific):  How long has patient had these symptoms:  Pharmacy name and phone #:  Would the patient rather a call back or a response via [facility]? Best Call Back Number:  [phone] Information: Patient called in regards to  schedule appt. Patient states she will like the surgery to be the last week of May

## 2025-04-30 ENCOUNTER — TELEPHONE (OUTPATIENT)
Dept: FAMILY MEDICINE | Facility: CLINIC | Age: 60
End: 2025-04-30
Payer: COMMERCIAL

## 2025-04-30 NOTE — TELEPHONE ENCOUNTER
----- Message from Shamika sent at 4/30/2025  2:39 PM CDT -----  Contact: self  .Type:  RX Refill RequestWho Called: .Anel Olivas WardRefill or New Rx: newRX Name and Strength:-How is the patient currently taking it? (ex. 1XDay):Is this a 30 day or 90 day RX:Preferred Pharmacy with phone number:.[facility] - [place], [place] - [address] [place] [address]Fvulk: [phone] Fax: [phone] or Mail Order:localOrdering Provider:royalWould the patient rather a call back or a response via [facility]? Call [facility] Call Back Number:.[phone] Information: Pt states she would like a call back from this office in regard to getting something called in for a scratch throat.

## 2025-05-01 ENCOUNTER — OFFICE VISIT (OUTPATIENT)
Dept: FAMILY MEDICINE | Facility: CLINIC | Age: 60
End: 2025-05-01
Payer: COMMERCIAL

## 2025-05-01 DIAGNOSIS — J32.9 RECURRENT SINUSITIS: Primary | ICD-10-CM

## 2025-05-01 RX ORDER — CEFDINIR 300 MG/1
300 CAPSULE ORAL 2 TIMES DAILY
Qty: 20 CAPSULE | Refills: 0 | Status: SHIPPED | OUTPATIENT
Start: 2025-05-01 | End: 2025-05-11

## 2025-05-01 NOTE — PROGRESS NOTES
"Subjective     Patient ID: Anel Schmitt is a 60 y.o. female.    Chief Complaint: No chief complaint on file.  The patient location is: [place]  The chief complaint leading to consultation is: Sinusitis    Visit type: audiovisual    Face to Face time with patient: 20 min   minutes of total time spent on the encounter, which includes face to face time and non-face to face time preparing to see the patient (eg, review of tests), Obtaining and/or reviewing separately obtained history, Documenting clinical information in the electronic or other health record, Independently interpreting results (not separately reported) and communicating results to the patient/family/caregiver, or Care coordination (not separately reported).         Each patient to whom he or she provides medical services by telemedicine is:  (1) informed of the relationship between the physician and patient and the respective role of any other health care provider with respect to management of the patient; and (2) notified that he or she may decline to receive medical services by telemedicine and may withdraw from such care at any time.    Notes:     Sinus Problem  This is a new problem. The current episode started in the past 7 days (Last treated for sinus infection in February; states had one of the abx left and took it last night). The problem has been waxing and waning since onset. There has been no fever. The pain is mild. Associated symptoms include congestion, sinus pressure and a sore throat (states "scratchy"). Pertinent negatives include no chills, coughing, diaphoresis, ear pain, headaches, hoarse voice, neck pain, shortness of breath, sneezing or swollen glands. Treatments tried: astelin, took one omnicef that she had left on last night; xyzal. The treatment provided mild (ENT advised due to recurrent sinusitis; pt declined) relief.     Past Medical History:   Diagnosis Date    GERD (gastroesophageal reflux disease)     Headache     " "Hypertension      Social History[1]  Past Surgical History:   Procedure Laterality Date    gastric sleeve      HYSTERECTOMY      OOPHORECTOMY         Review of Systems   Constitutional: Negative.  Negative for activity change, chills, diaphoresis and unexpected weight change.   HENT:  Positive for nasal congestion, rhinorrhea, sinus pressure/congestion and sore throat (states "scratchy"). Negative for ear pain, hoarse voice, sneezing and trouble swallowing.    Eyes: Negative.  Negative for discharge and visual disturbance.   Respiratory: Negative.  Negative for cough, chest tightness, shortness of breath and wheezing.    Cardiovascular: Negative.  Negative for chest pain and palpitations.   Gastrointestinal: Negative.  Negative for blood in stool, constipation, diarrhea and vomiting.   Endocrine: Negative.  Negative for polydipsia and polyuria.   Genitourinary: Negative.  Negative for difficulty urinating, dysuria, hematuria and menstrual problem.   Musculoskeletal: Negative.  Negative for arthralgias, joint swelling and neck pain.   Integumentary:  Negative.   Allergic/Immunologic: Negative.    Neurological: Negative.  Negative for weakness and headaches.   Psychiatric/Behavioral: Negative.  Negative for confusion and dysphoric mood.           Objective     Physical Exam  Constitutional:       Appearance: Normal appearance.   Neurological:      Mental Status: She is alert.            Assessment and Plan     1. Recurrent sinusitis  -     cefdinir (OMNICEF) 300 MG capsule; Take 1 capsule (300 mg total) by mouth 2 (two) times daily. for 10 days  Dispense: 20 capsule; Refill: 0  Hydrate well  Rest  Astelin as prescribed  Warm salt water gargles PRN  Report to ER immediately if symptoms worsen or persist               No follow-ups on file.         [1]   Social History  Socioeconomic History    Marital status: Single   Tobacco Use    Smoking status: Never    Smokeless tobacco: Never   Substance and Sexual Activity    " Alcohol use: No    Drug use: No    Sexual activity: Not Currently   Social History Narrative    ** Merged History Encounter **          Social Drivers of Health     Financial Resource Strain: High Risk (5/1/2025)    Overall Financial Resource Strain (CARDIA)     Difficulty of Paying Living Expenses: Very hard   Food Insecurity: Food Insecurity Present (5/1/2025)    Hunger Vital Sign     Worried About Running Out of Food in the Last Year: Often true     Ran Out of Food in the Last Year: Often true   Transportation Needs: Unmet Transportation Needs (5/1/2025)    PRAPARE - Transportation     Lack of Transportation (Medical): Yes     Lack of Transportation (Non-Medical): Yes   Physical Activity: Insufficiently Active (5/1/2025)    Exercise Vital Sign     Days of Exercise per Week: 2 days     Minutes of Exercise per Session: 20 min   Stress: No Stress Concern Present (5/1/2025)    [employer] - Occupational Stress Questionnaire     Feeling of Stress : Not at all   Recent Concern: Stress - Stress Concern Present (1/31/2025)    [employer] - Occupational Stress Questionnaire     Feeling of Stress : Very much   Housing Stability: High Risk (5/1/2025)    Housing Stability Vital Sign     Unable to Pay for Housing in the Last Year: Yes     Number of Times Moved in the Last Year: 0     Homeless in the Last Year: No

## 2025-05-05 DIAGNOSIS — E88.810 METABOLIC SYNDROME: ICD-10-CM

## 2025-05-05 NOTE — TELEPHONE ENCOUNTER
----- Message from InELERTS sent at 5/5/2025 11:56 AM CDT -----  .Type:  RX Refill RequestWho Called: patientRefill or New Rx:refillRX Name and Strength:semaglutide, weight loss, (WEGOVY) 1.7 mg/0.75 mL PnIjHow is the patient currently taking it? (ex. 1XDay):Is this a 30 day or 90 day RX:Preferred Pharmacy with phone number:.[facility] [address] [address] [facility] [facility] [place] [address] Telephone [phone] or Mail Order:localOrdering Provider:Irene Christie MDWould the patient rather a call back or a response via [facility]? callBe Call Back Number:.[phone] Information:

## 2025-05-05 NOTE — TELEPHONE ENCOUNTER
Care Due:                  Date            Visit Type   Department     Provider  --------------------------------------------------------------------------------                                EP -                              PRIMARY      [facility] FAMILY  Last Visit: 02-      CARE ([facility])   MEDICINE       Stella Knutson  Next Visit: None Scheduled  None         None Found                                                            Last  Test          Frequency    Reason                     Performed    Due Date  --------------------------------------------------------------------------------    HBA1C.......  6 months...  semaglutide,.............  10-   04-    [facility] Embedded Care Due Messages. Reference number: [id number].   5/05/2025 12:56:25 PM CDT

## 2025-05-06 RX ORDER — SEMAGLUTIDE 1.7 MG/.75ML
1.7 INJECTION, SOLUTION SUBCUTANEOUS WEEKLY
Qty: 10 ML | Refills: 6 | Status: SHIPPED | OUTPATIENT
Start: 2025-05-06 | End: 2025-05-08 | Stop reason: SDUPTHER

## 2025-05-06 NOTE — TELEPHONE ENCOUNTER
Refill Routing Note   Medication(s) are not appropriate for processing by [facility] for the following reason(s):        Required labs outdated    [facility] action(s):  Defer     Requires labs : Yes             Appointments  past 12m or future 3m with PCP    Date Provider   Last Visit   2/4/2025 Irene Christie MD   Next Visit   Visit date not found Irene Christie MD   ED visits in past 90 days: 0        Note composed:9:56 PM 05/05/2025

## 2025-05-08 ENCOUNTER — TELEPHONE (OUTPATIENT)
Dept: FAMILY MEDICINE | Facility: CLINIC | Age: 60
End: 2025-05-08
Payer: COMMERCIAL

## 2025-05-08 DIAGNOSIS — E88.810 METABOLIC SYNDROME: ICD-10-CM

## 2025-05-08 DIAGNOSIS — E66.09 CLASS 1 OBESITY DUE TO EXCESS CALORIES WITH SERIOUS COMORBIDITY AND BODY MASS INDEX (BMI) OF 33.0 TO 33.9 IN ADULT: Primary | ICD-10-CM

## 2025-05-08 DIAGNOSIS — E66.811 CLASS 1 OBESITY DUE TO EXCESS CALORIES WITH SERIOUS COMORBIDITY AND BODY MASS INDEX (BMI) OF 33.0 TO 33.9 IN ADULT: Primary | ICD-10-CM

## 2025-05-08 RX ORDER — SEMAGLUTIDE 1.7 MG/.75ML
1.7 INJECTION, SOLUTION SUBCUTANEOUS WEEKLY
Qty: 10 ML | Refills: 6 | Status: SHIPPED | OUTPATIENT
Start: 2025-05-08 | End: 2025-05-08

## 2025-05-08 RX ORDER — SEMAGLUTIDE 2.68 MG/ML
2 INJECTION, SOLUTION SUBCUTANEOUS
Qty: 3 ML | Refills: 11 | Status: SHIPPED | OUTPATIENT
Start: 2025-05-08 | End: 2026-05-08

## 2025-05-08 NOTE — TELEPHONE ENCOUNTER
No care due was identified.  Health [facility] Embedded Care Due Messages. Reference number: [id number].   5/08/2025 11:50:49 AM CDT

## 2025-05-08 NOTE — TELEPHONE ENCOUNTER
Pt is asking this medication be compounded. Please advise     Additional Information: medication was received by pharmacy but it needs to specify compound on the prescription

## 2025-05-08 NOTE — TELEPHONE ENCOUNTER
Spoke with patient about her insulin not be sent to the pharmacy. Script was sent to the pharmacy on patient file on 05/06/25 with 6 refills. Patient stated will call pharmacy.

## 2025-05-08 NOTE — TELEPHONE ENCOUNTER
05/08/2025 11:49 AM   Spoke with pt. She agreed to try [facility] Direct. Message sent to PCP to have her send the prescription.

## 2025-05-08 NOTE — TELEPHONE ENCOUNTER
No orders of the defined types were placed in this encounter.      Medications Ordered This Encounter   Medications    semaglutide (OZEMPIC) 2 mg/dose (8 mg/3 mL) PnIj     Sig: Inject 2 mg into the skin every 7 days.     Dispense:  3 mL     Refill:  11

## 2025-05-08 NOTE — TELEPHONE ENCOUNTER
Copied from CRM #[id number]. Topic: Medications - Medication Question  >> May 8, 2025 11:12 AM Leticia wrote:  Type:  RX Refill Request    Who Called: pt  Refill or New Rx:refill  RX Name and Strength:semaglutide, weight loss, (WEGOVY) 1.7 mg/0.75 mL PnIj 10 mL 6 5/6/2025 - No  Sig - Route: Inject 1.7 mg into the skin once a week. - Subcutaneous  Sent to pharmacy as: semaglutide, weight loss, (WEGOVY) 1.7 mg/0.75 mL PnIj  Class: Normal  Notes to Pharmacy: WANTS TO GO BACK TO SEMAGLUTIDE AND INCREASE HER DOSE (SHE WAS TAKING 1.7MG IN NOVEMBER SO 2MG DOSE WOULD BE INCREASE IF APPROPRIATE)  Order: [id number]  Date/Time Signed: 5/6/2025 10:31      E-Prescribing Status: Receipt confirmed by pharmacy (5/6/2025 10:41 AM CDT)  No prior authorization was found for this prescription.  Found prior authorization for another prescription for the same medication: Closed - Other  Preferred Pharmacy with phone number:  [facility] in [place]  Local or Mail Order:local  Ordering Provider:[place]  Would the patient rather a call back or a response via [facility]? call  Best Call Back Number: [phone]  Additional Information: medication was received by pharmacy but it needs to specify compound on the prescription

## 2025-05-08 NOTE — TELEPHONE ENCOUNTER
----- Message from Mari sent at 5/8/2025 10:56 AM CDT -----  Contact: Mobile  [phone]  Comments: Patient would like to know what is the status of her order for her insulin? Patient said that she put in an order two days ago.

## 2025-05-15 ENCOUNTER — PATIENT OUTREACH (OUTPATIENT)
Dept: ADMINISTRATIVE | Facility: OTHER | Age: 60
End: 2025-05-15
Payer: COMMERCIAL

## 2025-05-16 ENCOUNTER — TELEPHONE (OUTPATIENT)
Dept: FAMILY MEDICINE | Facility: CLINIC | Age: 60
End: 2025-05-16
Payer: COMMERCIAL

## 2025-05-16 ENCOUNTER — PATIENT MESSAGE (OUTPATIENT)
Dept: FAMILY MEDICINE | Facility: CLINIC | Age: 60
End: 2025-05-16

## 2025-05-16 ENCOUNTER — OFFICE VISIT (OUTPATIENT)
Dept: FAMILY MEDICINE | Facility: CLINIC | Age: 60
End: 2025-05-16
Payer: COMMERCIAL

## 2025-05-16 DIAGNOSIS — E66.09 CLASS 1 OBESITY DUE TO EXCESS CALORIES WITH SERIOUS COMORBIDITY AND BODY MASS INDEX (BMI) OF 33.0 TO 33.9 IN ADULT: ICD-10-CM

## 2025-05-16 DIAGNOSIS — E66.811 CLASS 1 OBESITY DUE TO EXCESS CALORIES WITH SERIOUS COMORBIDITY AND BODY MASS INDEX (BMI) OF 33.0 TO 33.9 IN ADULT: ICD-10-CM

## 2025-05-16 DIAGNOSIS — T78.40XS ALLERGIC REACTION, SEQUELA: Primary | ICD-10-CM

## 2025-05-16 PROBLEM — T78.40XA ALLERGIC REACTION: Status: ACTIVE | Noted: 2025-05-16

## 2025-05-16 NOTE — PROGRESS NOTES
Audio Only Telehealth Visit     The patient location is: [place]  The chief complaint leading to consultation is: allergic reaction  Visit type: Virtual visit with audio only (telephone)  Total time spent in medical discussion with patient: 14 minutes  Total time spent on date of the encounter:15 minutes       The reason for the audio only service rather than synchronous audio and video virtual visit was related to technical difficulties or patient preference/necessity.       Each patient to whom I provide medical services by telemedicine is:  (1) informed of the relationship between the physician and patient and the respective role of any other health care provider with respect to management of the patient; and (2) notified that they may decline to receive medical services by telemedicine and may withdraw from such care at any time. Patient verbally consented to receive this service via voice-only telephone call.  1. Allergic reaction, sequela  Overview:  Likely 2/2 hair dye vs new soap  Seen by derm: zyrtec and steroid oil  Denies mucocutaneous involvement, no distress  Prn rtc       2. Class 1 obesity due to excess calories with serious comorbidity and body mass index (BMI) of 33.0 to 33.9 in adult  Overview:  Wt Readings from Last 3 Encounters:   03/20/25 0853 102.6 kg (226 lb 3.1 oz)   02/17/25 0938 101.9 kg (224 lb 11.2 oz)   02/11/25 0940 102.3 kg (225 lb 8.5 oz)   Previously on glp1 compound, info given for [facility] direct self pay option      Patient denies family history and personal history of thyroid cancer, pancreatic cancer or pancreatitis.       General weight loss/lifestyle modification strategies discussed: limit sugary drinks, exercise 3-5x per week  Informal exercise measures discussed, e.g. taking stairs instead of elevator.     hx of VSG             History of Present Illness    CHIEF COMPLAINT:  Patient presents today for allergic reaction    ALLERGIC REACTION AND SKIN SENSITIVITY:  She reports  generalized pruritus without associated rash that migrates to different areas including scalp, arms, legs, and thighs following use of new black hair color 2 days ago and new scented soap 1 week ago. She denies oral involvement, sores, or bumps. She has very sensitive skin and avoids cologne and lotions due to known skin sensitivity. She was evaluated by dermatology who prescribed Zyrtec, medicated shampoo, and prescription steroid oil for scalp application.    MEDICATION HISTORY:  She previously took Mounjaro from a compound pharmacy but discontinued due to insurance coverage issues.       FOLLOW-UP AND ADMINISTRATIVE:  Prn rtc  - Discussed Mounjaro medication, noting insurance coverage issues for compounded version.  - Patient advised to contact the office through [employer] messaging system if needed.  -  assistance offered for [employer] setup if patient continues to have difficulties.                          This service was not originating from a related E/M service provided within the previous 7 days nor will  to an E/M service or procedure within the next 24 hours or my soonest available appointment.  Prevailing standard of care was able to be met in this audio-only visit.

## 2025-05-16 NOTE — TELEPHONE ENCOUNTER
----- Message from Shamika sent at 5/16/2025  9:53 AM CDT -----  Contact: self  .Type:  RX Refill RequestWho Called: .Anel Olivas WardRefill or New Rx:RX Name and Strength: How is the patient currently taking it? (ex. 1XDay):Is this a 30 day or 90 day RX:Preferred Pharmacy with phone number:.[facility] - [place], [place] - [address] [facility] [address]Fnshp: [phone] Fax: [phone] or Mail Order:localOrdering Provider:royalWould the patient rather a call back or a response via [facility]? Call [facility] Call Back Number:.[phone] Information: pt states she thinks she is having an allergic reaction she is itching very badly, wants something called in for her.

## 2025-05-16 NOTE — TELEPHONE ENCOUNTER
Spoke with patient about have an allergic reaction to some laundry and hand soap since yesterday. Patient stated is not having no fever, swelling, N/V. Patient stated will the provider be able to send something in for her.

## 2025-05-20 ENCOUNTER — TELEPHONE (OUTPATIENT)
Dept: PODIATRY | Facility: CLINIC | Age: 60
End: 2025-05-20
Payer: COMMERCIAL

## 2025-05-22 ENCOUNTER — PATIENT OUTREACH (OUTPATIENT)
Dept: ADMINISTRATIVE | Facility: OTHER | Age: 60
End: 2025-05-22
Payer: COMMERCIAL

## 2025-05-27 ENCOUNTER — OFFICE VISIT (OUTPATIENT)
Dept: PODIATRY | Facility: CLINIC | Age: 60
End: 2025-05-27
Payer: COMMERCIAL

## 2025-05-27 VITALS — BODY MASS INDEX: 31.67 KG/M2 | WEIGHT: 226.19 LBS | HEIGHT: 71 IN

## 2025-05-27 DIAGNOSIS — M20.5X2 ADDUCTOVARUS ROTATION OF TOE, ACQUIRED, LEFT: Primary | ICD-10-CM

## 2025-05-27 DIAGNOSIS — L84 CORN OR CALLUS: ICD-10-CM

## 2025-05-27 DIAGNOSIS — M20.42 HAMMER TOE OF LEFT FOOT: ICD-10-CM

## 2025-05-27 PROCEDURE — 99999 PR PBB SHADOW E&M-EST. PATIENT-LVL III: CPT | Mod: PBBFAC,,, | Performed by: PODIATRIST

## 2025-05-27 PROCEDURE — 99213 OFFICE O/P EST LOW 20 MIN: CPT | Mod: S$GLB,,, | Performed by: PODIATRIST

## 2025-05-27 PROCEDURE — 1159F MED LIST DOCD IN RCRD: CPT | Mod: CPTII,S$GLB,, | Performed by: PODIATRIST

## 2025-05-27 PROCEDURE — 1160F RVW MEDS BY RX/DR IN RCRD: CPT | Mod: CPTII,S$GLB,, | Performed by: PODIATRIST

## 2025-05-27 PROCEDURE — 3008F BODY MASS INDEX DOCD: CPT | Mod: CPTII,S$GLB,, | Performed by: PODIATRIST

## 2025-05-27 RX ORDER — AMOXICILLIN 500 MG/1
500 CAPSULE ORAL 3 TIMES DAILY
COMMUNITY
Start: 2025-05-19

## 2025-05-27 RX ORDER — KETOCONAZOLE 20 MG/ML
SHAMPOO, SUSPENSION TOPICAL
COMMUNITY
Start: 2025-05-16

## 2025-05-27 NOTE — PROGRESS NOTES
Subjective:      Patient ID: Anel Schmitt is a 60 y.o. female.    Chief Complaint: Toe Pain    Anel is a 60 y.o. female who presents to the podiatry clinic  with complaint of  left foot pain with the fifth toe associated with corn to the toe and pressure. Onset of the symptoms was several years ago. Precipitating event: none known. Current symptoms include: ability to bear weight, but with some pain. Aggravating factors: shoes. Symptoms have gradually worsened. Patient has had prior foot problems. She has had the corn trimmed down but it comes right back, she had surgery on the toe a few years ago but it did not help    5/27/25: patient returns for follow up left foot fifth toe pain, she is here to set up surgery to repair the hammertoe as discussed last appointment    Review of Systems   Constitutional: Negative for chills and fever.   Cardiovascular:  Negative for claudication and leg swelling.   Respiratory:  Negative for shortness of breath.    Skin:  Negative for itching, nail changes and rash.   Musculoskeletal:  Negative for muscle cramps, muscle weakness and myalgias.   Gastrointestinal:  Negative for nausea and vomiting.   Neurological:  Negative for focal weakness, loss of balance, numbness and paresthesias.           Objective:      Physical Exam  Constitutional:       General: She is not in acute distress.     Appearance: She is well-developed. She is not diaphoretic.   Cardiovascular:      Pulses:           Dorsalis pedis pulses are 2+ on the right side and 2+ on the left side.        Posterior tibial pulses are 2+ on the right side and 2+ on the left side.      Comments: < 3 sec capillary refill time to toes 1-5 bilateral. Toes and feet are warm to touch proximally with normal distal cooling b/l. There is some hair growth on the feet and toes b/l. There is no edema b/l. No spider veins or varicosities present b/l.     Musculoskeletal:      Comments: Equinus noted b/l ankles with < 10 deg DF  noted. MMT 5/5 in DF/PF/Inv/Ev resistance with no reproduction of pain in any direction. Passive range of motion of ankle and pedal joints is painless b/l.    Left fifth toe non reducible adductovarus rotation with PIPJ contracture and dorsal PIPJ hyperkeratotic lesion with pain to palpation   Skin:     General: Skin is warm and dry.      Coloration: Skin is not pale.      Findings: No abrasion, bruising, burn, ecchymosis, erythema, laceration, lesion, petechiae or rash.      Nails: There is no clubbing.      Comments: Skin temperature, texture and turgor within normal limits.   Neurological:      Mental Status: She is alert and oriented to person, place, and time.      Sensory: No sensory deficit.      Motor: No tremor, atrophy or abnormal muscle tone.      Comments: Negative tinel sign bilateral.   Psychiatric:         Behavior: Behavior normal.               Assessment:       Encounter Diagnoses   Name Primary?    Adductovarus rotation of toe, acquired, left Yes    Hammer toe of left foot     Corn or callus          Plan:       Anel was seen today for toe pain.    Diagnoses and all orders for this visit:    Adductovarus rotation of toe, acquired, left  -     Case Request Operating Room: CORRECTION, HAMMER TOE  -     LIDOcaine 4 % Gel; Apply topically up to 4 times daily as needed to painful toe    Hammer toe of left foot  -     Case Request Operating Room: CORRECTION, HAMMER TOE  -     LIDOcaine 4 % Gel; Apply topically up to 4 times daily as needed to painful toe    Corn or callus  -     Case Request Operating Room: CORRECTION, HAMMER TOE  -     LIDOcaine 4 % Gel; Apply topically up to 4 times daily as needed to painful toe      I counseled the patient on her conditions, their implications and medical management.    Discussed offloading with wider shoes and toe sleeves, avoid tight fitting shoes. Recommended altra tennis shoes. She relates she has tried this and would like to progress to surgical  correction    Case set for 7/3/25    Referred to PCP for medical optimization and risk stratification    Upon reviewing the risks/benefits,  the planned procedure, the surgical goal, and the postoperative course for the Lt. Foot 5th toe derotational arthroplasty, the written consent was signed, timed, and dated by the patient with a witness present.        Sandeep Treadwell DPM

## 2025-05-30 ENCOUNTER — TELEPHONE (OUTPATIENT)
Dept: FAMILY MEDICINE | Facility: CLINIC | Age: 60
End: 2025-05-30
Payer: COMMERCIAL

## 2025-05-30 DIAGNOSIS — K59.00 CONSTIPATION, UNSPECIFIED CONSTIPATION TYPE: ICD-10-CM

## 2025-05-30 NOTE — TELEPHONE ENCOUNTER
Refill Routing Note   Medication(s) are not appropriate for processing by [facility] for the following reason(s):        Outside of protocol    [facility] action(s):  Route             Appointments  past 12m or future 3m with PCP    Date Provider   Last Visit   5/16/2025 Irene Christie MD   Next Visit   Visit date not found Irene Christie MD   ED visits in past 90 days: 0        Note composed:8:50 AM 05/30/2025

## 2025-05-30 NOTE — TELEPHONE ENCOUNTER
Copied from CRM #[id number]. Topic: Appointments - Appointment Scheduling  >> May 30, 2025  1:12 PM Sophie wrote:    Caller is requesting a sooner appointment.  Caller declined first available appointment listed below.  Caller will not accept being placed on the waitlist and is requesting a message be sent to doctor.   Name of Caller:Anel   When is the first available appointment? June18   Symptoms:clearance for surgery   Would the patient rather a call back or a response via [facility]? Call back   Best Call Back Number:[phone]   Additional Information: The patient would like to be scheduled on a Friday.

## 2025-05-30 NOTE — TELEPHONE ENCOUNTER
----- Message from Sophie sent at 5/30/2025  8:47 AM CDT -----  Contact: self [phone]  Type:  RX Refill RequestWho Called: AnnieRefill or New Rx:refillRX Name and Strength:linaCLOtide (LINZESS) 145 mcg Cap capsuleHow is the patient currently taking it? (ex. 1XDay):Is this a 30 day or 90 day RX:Preferred Pharmacy with phone number:[facility] - [place] - [address] [facility] [address]: [phone] Fax: [phone] Local or Mail Order:localOrdering Provider:RoyalWould the patient rather a call back or a response via [facility]? [facility] Call Back Number:[phone] Information: Patient would like a refill of the Linzess.

## 2025-05-30 NOTE — TELEPHONE ENCOUNTER
----- Message from Roxana Hunter DNP sent at 5/30/2025 11:53 AM CDT -----  Contact: self [phone]    ----- Message -----  From: Sophie Liriano  Sent: 5/30/2025   9:04 AM CDT  To: Roxana Hunter DNP    Type:  Sooner Apoointment RequestCaller is requesting a sooner appointment.  Caller declined first available appointment listed below.  Caller will not accept being placed on the waitlist and is requesting a message be sent to doctor.Name of Caller:Kin is the first available appointment? [id number]:clearance for surgeryWould the patient rather a call back or a response via [facility]? [facility] Call Back Number:[phone] Information: The patient would like to be scheduled on a Friday.

## 2025-05-30 NOTE — TELEPHONE ENCOUNTER
No care due was identified.  [facility] Embedded Care Due Messages. Reference number: [id number].   5/30/2025 8:49:43 AM CDT

## 2025-05-30 NOTE — TELEPHONE ENCOUNTER
Copied from CRM #[id number]. Topic: Medications - Medication Refill  >> May 30, 2025  8:44 AM Sophie wrote:  Type:  RX Refill Request    Who Called: Anel  Refill or New Rx:refill  RX Name and Strength:linaCLOtide (LINZESS) 145 mcg Cap capsule  How is the patient currently taking it? (ex. 1XDay):  Is this a 30 day or 90 day RX:  Preferred Pharmacy with phone number:  [facility] [address] [place] [address]  Phone: [phone] Fax: [phone]     Local or Mail Order:local  Ordering Provider:[place]  Would the patient rather a call back or a response via [facility]? Call back  Best Call Back Number:[phone]  Additional Information: Patient would like a refill of the Linzess.

## 2025-06-02 ENCOUNTER — TELEPHONE (OUTPATIENT)
Dept: PODIATRY | Facility: CLINIC | Age: 60
End: 2025-06-02
Payer: COMMERCIAL

## 2025-06-04 ENCOUNTER — OFFICE VISIT (OUTPATIENT)
Dept: FAMILY MEDICINE | Facility: CLINIC | Age: 60
End: 2025-06-04
Payer: COMMERCIAL

## 2025-06-04 ENCOUNTER — RESULTS FOLLOW-UP (OUTPATIENT)
Dept: FAMILY MEDICINE | Facility: CLINIC | Age: 60
End: 2025-06-04

## 2025-06-04 ENCOUNTER — HOSPITAL ENCOUNTER (OUTPATIENT)
Dept: RADIOLOGY | Facility: HOSPITAL | Age: 60
Discharge: HOME | End: 2025-06-04
Attending: NURSE PRACTITIONER
Payer: COMMERCIAL

## 2025-06-04 ENCOUNTER — TELEPHONE (OUTPATIENT)
Dept: FAMILY MEDICINE | Facility: CLINIC | Age: 60
End: 2025-06-04
Payer: COMMERCIAL

## 2025-06-04 VITALS
OXYGEN SATURATION: 100 % | HEART RATE: 61 BPM | DIASTOLIC BLOOD PRESSURE: 82 MMHG | BODY MASS INDEX: 31.06 KG/M2 | TEMPERATURE: 97 F | WEIGHT: 221.88 LBS | SYSTOLIC BLOOD PRESSURE: 126 MMHG | HEIGHT: 71 IN

## 2025-06-04 DIAGNOSIS — Z01.818 PRE-OP TESTING: ICD-10-CM

## 2025-06-04 DIAGNOSIS — Z76.0 MEDICATION REFILL: ICD-10-CM

## 2025-06-04 DIAGNOSIS — Z01.818 PRE-OP TESTING: Primary | ICD-10-CM

## 2025-06-04 LAB
BILIRUB UR QL STRIP.AUTO: NEGATIVE
CLARITY UR: CLEAR
COLOR UR AUTO: YELLOW
GLUCOSE UR QL STRIP: NEGATIVE
HGB UR QL STRIP: NEGATIVE
HOLD SPECIMEN: NORMAL
KETONES UR QL STRIP: NEGATIVE
LEUKOCYTE ESTERASE UR QL STRIP: NEGATIVE
NITRITE UR QL STRIP: NEGATIVE
PH UR STRIP: 7 [PH]
PROT UR QL STRIP: NEGATIVE
SP GR UR STRIP: 1.01

## 2025-06-04 PROCEDURE — 71046 X-RAY EXAM CHEST 2 VIEWS: CPT | Mod: TC,PO

## 2025-06-04 PROCEDURE — 3074F SYST BP LT 130 MM HG: CPT | Mod: CPTII,S$GLB,, | Performed by: NURSE PRACTITIONER

## 2025-06-04 PROCEDURE — 3008F BODY MASS INDEX DOCD: CPT | Mod: CPTII,S$GLB,, | Performed by: NURSE PRACTITIONER

## 2025-06-04 PROCEDURE — 71046 X-RAY EXAM CHEST 2 VIEWS: CPT | Mod: 26,,, | Performed by: RADIOLOGY

## 2025-06-04 PROCEDURE — 99999 PR PBB SHADOW E&M-EST. PATIENT-LVL IV: CPT | Mod: PBBFAC,,, | Performed by: NURSE PRACTITIONER

## 2025-06-04 PROCEDURE — 93005 ELECTROCARDIOGRAM TRACING: CPT | Mod: S$GLB,,, | Performed by: NURSE PRACTITIONER

## 2025-06-04 PROCEDURE — 99213 OFFICE O/P EST LOW 20 MIN: CPT | Mod: S$GLB,,, | Performed by: NURSE PRACTITIONER

## 2025-06-04 PROCEDURE — 81002 URINALYSIS NONAUTO W/O SCOPE: CPT | Mod: PO | Performed by: NURSE PRACTITIONER

## 2025-06-04 PROCEDURE — 3079F DIAST BP 80-89 MM HG: CPT | Mod: CPTII,S$GLB,, | Performed by: NURSE PRACTITIONER

## 2025-06-04 PROCEDURE — 93010 ELECTROCARDIOGRAM REPORT: CPT | Mod: S$GLB,,, | Performed by: INTERNAL MEDICINE

## 2025-06-04 PROCEDURE — 1159F MED LIST DOCD IN RCRD: CPT | Mod: CPTII,S$GLB,, | Performed by: NURSE PRACTITIONER

## 2025-06-04 PROCEDURE — 1160F RVW MEDS BY RX/DR IN RCRD: CPT | Mod: CPTII,S$GLB,, | Performed by: NURSE PRACTITIONER

## 2025-06-04 NOTE — H&P (VIEW-ONLY)
Subjective     Patient ID: Anel Schmitt is a 60 y.o. female.    Chief Complaint: Pre-op Exam (Surgery 7/3/25( toe ))  Pt in today for pre-op exam for foot surgery. Pt denies any previous anesthesia complications. BP WNL. Pre-op labs, EKG, radiology studies ordered. Pt also requesting linzess refill. Pt has no other complaints today.  Past Medical History:   Diagnosis Date    GERD (gastroesophageal reflux disease)     Headache     Hypertension      Social History[1]  Past Surgical History:   Procedure Laterality Date    gastric sleeve      HYSTERECTOMY      OOPHORECTOMY         HPI  Review of Systems   Constitutional: Negative.    HENT: Negative.     Eyes: Negative.    Respiratory: Negative.     Cardiovascular: Negative.    Gastrointestinal: Negative.    Endocrine: Negative.    Genitourinary: Negative.    Musculoskeletal: Negative.    Integumentary:  Negative.   Allergic/Immunologic: Negative.    Neurological: Negative.    Psychiatric/Behavioral: Negative.            Objective     Physical Exam  Vitals and nursing note reviewed.   Constitutional:       Appearance: Normal appearance.   HENT:      Head: Normocephalic.      Right Ear: Tympanic membrane, ear canal and external ear normal.      Left Ear: Tympanic membrane, ear canal and external ear normal.      Nose: Nose normal.      Mouth/Throat:      Mouth: Mucous membranes are moist.      Pharynx: Oropharynx is clear.   Eyes:      Conjunctiva/sclera: Conjunctivae normal.      Pupils: Pupils are equal, round, and reactive to light.   Cardiovascular:      Rate and Rhythm: Normal rate and regular rhythm.      Pulses: Normal pulses.      Heart sounds: Normal heart sounds.   Pulmonary:      Effort: Pulmonary effort is normal.      Breath sounds: Normal breath sounds.   Abdominal:      General: Bowel sounds are normal.      Palpations: Abdomen is soft.   Musculoskeletal:         General: Normal range of motion.      Cervical back: Normal range of motion and neck  supple.   Skin:     General: Skin is warm.      Capillary Refill: Capillary refill takes 2 to 3 seconds.   Neurological:      Mental Status: She is alert and oriented to person, place, and time.   Psychiatric:         Mood and Affect: Mood normal.         Behavior: Behavior normal.         Thought Content: Thought content normal.         Judgment: Judgment normal.            Assessment and Plan     1. Pre-op testing  -     CBC Without Differential; Future; Expected date: 06/04/2025  -     Comprehensive Metabolic Panel; Future; Expected date: 06/04/2025  -     Protime-INR; Future; Expected date: 06/04/2025  -     IN OFFICE EKG 12-LEAD (to Muse)  -     Urinalysis, Reflex to Urine Culture  -     X-Ray Chest PA And Lateral; Future; Expected date: 06/04/2025  -     GREY [place] URINE HOLD    2. Medication refill  -     linaCLOtide (LINZESS) 145 mcg Cap capsule; Take 1 capsule (145 mcg total) by mouth before breakfast.  Dispense: 90 capsule; Refill: 1    Hydrate well  Rest  Report to ER immediately if symptoms worsen or persist             No follow-ups on file.         [1]   Social History  Socioeconomic History    Marital status: Single   Tobacco Use    Smoking status: Never    Smokeless tobacco: Never   Substance and Sexual Activity    Alcohol use: No    Drug use: No    Sexual activity: Not Currently   Social History Narrative    ** Merged History Encounter **          Social Drivers of Health     Financial Resource Strain: High Risk (5/1/2025)    Overall Financial Resource Strain (CARDIA)     Difficulty of Paying Living Expenses: Very hard   Food Insecurity: Food Insecurity Present (5/1/2025)    Hunger Vital Sign     Worried About Running Out of Food in the Last Year: Often true     Ran Out of Food in the Last Year: Often true   Transportation Needs: Unmet Transportation Needs (5/1/2025)    PRAPARE - Transportation     Lack of Transportation (Medical): Yes     Lack of Transportation (Non-Medical): Yes   Physical Activity:  Insufficiently Active (5/1/2025)    Exercise Vital Sign     Days of Exercise per Week: 2 days     Minutes of Exercise per Session: 20 min   Stress: No Stress Concern Present (5/1/2025)    [employer] [place] - Occupational Stress Questionnaire     Feeling of Stress : Not at all   Recent Concern: Stress - Stress Concern Present (1/31/2025)    [employer] [place] - Occupational Stress Questionnaire     Feeling of Stress : Very much   Housing Stability: High Risk (5/1/2025)    Housing Stability Vital Sign     Unable to Pay for Housing in the Last Year: Yes     Number of Times Moved in the Last Year: 0     Homeless in the Last Year: No

## 2025-06-05 ENCOUNTER — TELEPHONE (OUTPATIENT)
Dept: FAMILY MEDICINE | Facility: CLINIC | Age: 60
End: 2025-06-05
Payer: COMMERCIAL

## 2025-06-05 LAB
OHS QRS DURATION: 76 MS
OHS QTC CALCULATION: 395 MS

## 2025-06-19 ENCOUNTER — TELEPHONE (OUTPATIENT)
Dept: PODIATRY | Facility: CLINIC | Age: 60
End: 2025-06-19
Payer: COMMERCIAL

## 2025-06-19 NOTE — TELEPHONE ENCOUNTER
Spoke with the patient concerning message about SX. Patient was advised that I would not be able to provide information on the SX, but would notify the nurse to give her a call on 06/20/25. Message was left for the nurse and CRM was routed to the nurse for advisement. Patient expressed understanding of the message.

## 2025-06-23 ENCOUNTER — E-VISIT (OUTPATIENT)
Dept: FAMILY MEDICINE | Facility: CLINIC | Age: 60
End: 2025-06-23
Payer: COMMERCIAL

## 2025-06-23 ENCOUNTER — TELEPHONE (OUTPATIENT)
Dept: FAMILY MEDICINE | Facility: CLINIC | Age: 60
End: 2025-06-23
Payer: COMMERCIAL

## 2025-06-23 DIAGNOSIS — B37.9 YEAST INFECTION: Primary | ICD-10-CM

## 2025-06-23 NOTE — TELEPHONE ENCOUNTER
Copied from CRM #[id number]. Topic: Medications - New Medication Request  >> Jun 23, 2025  8:13 AM Oumou wrote:  Type:  Needs Medical Advice    Who Called: Anel Olivas Schmitt  Symptoms (please be specific): yeast infection   How long has patient had these symptoms:  2 days  Pharmacy name and phone #:    [facility] - [place], [place] - [address]  [address]  [place] [address]  Phone: [phone] Fax: [phone]  Would the patient rather a call back or a response via [facility]? Call back  Best Call Back Number: [phone]  Additional Information: pt needing a medication to be called in for her yeast infection

## 2025-06-25 ENCOUNTER — PATIENT MESSAGE (OUTPATIENT)
Dept: FAMILY MEDICINE | Facility: CLINIC | Age: 60
End: 2025-06-25
Payer: COMMERCIAL

## 2025-06-25 ENCOUNTER — TELEPHONE (OUTPATIENT)
Dept: FAMILY MEDICINE | Facility: CLINIC | Age: 60
End: 2025-06-25
Payer: COMMERCIAL

## 2025-06-25 RX ORDER — FLUCONAZOLE 150 MG/1
TABLET ORAL
Qty: 2 TABLET | Refills: 1 | Status: SHIPPED | OUTPATIENT
Start: 2025-06-25

## 2025-06-25 NOTE — TELEPHONE ENCOUNTER
Patient stated that she is having some vaginal discomfort and itching.    She was recently prescribed antibiotics by her dentist. She also used Dr. Gasca's bath salt that had caused more irritation.    She is requesting a medication to be called in. An appointment was offered but the patient stated that she has work.    Please advise.

## 2025-06-25 NOTE — PROGRESS NOTES
Patient ID: Anel Schmitt is a 60 y.o. female.    Chief Complaint: Urinary Tract Infection (Entered automatically based on patient selection in [facility].)    The patient initiated a request through [facility] on 6/23/2025 for evaluation and management with a chief complaint of Urinary Tract Infection (Entered automatically based on patient selection in [facility].)     I evaluated the questionnaire submission on 06/25/2025  .    [facility] Peq Evisit Uti Questionnaire    6/25/2025  4:07 PM CDT - Filed by Patient   Do you agree to participate in an E-Visit? Yes   If you have any of the following symptoms, please go to the nearest emergency room or call 911: I acknowledge   Choose the state of your primary residence [place]   What is the main issue you would like addressed today? Yeast Infection   Do you have any of the following symptoms? ( Discomfort or pain passing urine, Passing urine more frequently, Cloudy urine, Discharge in urine, Other, None) Other   What urinary symptoms are you experiencing? Yeast infection   When did your concern begin? 6/25/2025   What medications or treatments have you used to help your symptoms? (Antibiotics, Cranberry products, Drinking more fluids, Painkillers, Other, None) None   What does your urine look like? (Clear, Cloudy, Dark yellow, Light yellow, Pink or red (bloody), Foamy, Not sure) Not sure   Have you had any of the following symptoms during the past 24 hours?   Urgency (a sudden and uncontrollable urge to urinate) (None, Mild, Moderate, Severe) Moderate   Frequency (going to the toilet very often) (None, Mild, Moderate, Severe) Moderate   Burning pain when urinating (None, Mild, Moderate, Severe) Moderate   Incomplete bladder emptying (still feel like you need to urinate again after urination) (None, Mild, Moderate, Severe) None   Pain in the lower belly when youre not urinating. (None, Mild, Moderate, Severe) None   Please rate how much discomfort these symptoms have caused in  the last 24 hours. (None, Mild, Moderate, Severe) None   Have you had any of the following symptoms during the past 24 hours?   Blood seen in the urine (None, Mild, Moderate, Severe) None   Pain in the lower back on one or both sides (flank pain) (None, Mild, Moderate, Severe) None   Abnormal Vaginal Discharge (abnormal amount, color and/or odor) (None, Mild, Moderate, Severe) None   Discharge from the opening you urinate from (urethra) when not urinating. (None, Mild, Moderate, Severe) None   Tell us how the symptoms have interfered with your every day activities/work in the past 24 hours. (None, Mild, Moderate, Severe) None   Do you have a fever? (Less than 100.4°F, 100.4°F or greater, No, Not sure) No   Are you a diabetic? No   If you are female, please tell us if you have any of the following right now (as youre completing the questionnaire): (Menstrual period (menses), PMS (Premenstrual syndrome),Signs of menopause such as hot flashes, Pregnancy, None) None   Do you have a history of kidney stones? No   Provide any information you feel is important to your history not asked above External Burning   Please attach any relevant images or files (if you have performed a home test for UTI, please submit a photo of results)    Are you able to take your vitals? No         1. Yeast infection  -     fluconazole (DIFLUCAN) 150 MG Tab; Take 1 tab by mouth on day 1; if symptoms persist after 72 hours, take 2nd tab  Dispense: 2 tablet; Refill: 1           Encounter Diagnosis   Name Primary?    Yeast infection Yes        No orders of the defined types were placed in this encounter.     Medications Ordered This Encounter   Medications    fluconazole (DIFLUCAN) 150 MG Tab     Sig: Take 1 tab by mouth on day 1; if symptoms persist after 72 hours, take 2nd tab     Dispense:  2 tablet     Refill:  1          Follow up as needed       E-Visit Time Trackin min

## 2025-06-25 NOTE — TELEPHONE ENCOUNTER
Copied from CRM #[id number]. Topic: General Inquiry - Patient Advice  >> Jun 25, 2025  3:24 PM Lillian wrote:  Pt has been calling for 3 days now             URGENT      Type:  Needs Medical Advice    Who Called:  pt  Symptoms (please be specific):  pt has been itching vaginal since on antibiotics   How long has patient had these symptoms:   3 days now  Pharmacy name and phone #:   [phone]  Would the patient rather a call back or a response via [facility]? phone  Best Call Back Number: [phone]   Additional Information:        [facility] - [place], [place] - [address]  [address]  [place] [address]  Phone: [phone] Fax: [phone]

## 2025-06-25 NOTE — TELEPHONE ENCOUNTER
Called patient and notified her of the E-Visit that was sent. She verbalized understanding of the E-Visit.    Her grandson is with her and assisting her with completing the visit.

## 2025-06-26 ENCOUNTER — TELEPHONE (OUTPATIENT)
Dept: SURGERY | Facility: HOSPITAL | Age: 60
End: 2025-06-26
Payer: COMMERCIAL

## 2025-06-26 NOTE — TELEPHONE ENCOUNTER
"Pt is scheduled for left hammer toe correction on Thurs., 7/3/2025. Pt wants to speak with someone from the office regarding "clarification of exactly what Dr. Treadwell is going to do & how long will her recovery be". Thank you.   "

## 2025-06-26 NOTE — TELEPHONE ENCOUNTER
Sandeep Treadwell, VARUN to Me  (Selected Message)        6/26/25  3:48 PM  I tried to call patient but her number in her file keeps saying cannot connect. The recovery takes 3 weeks to be back in a shoes, we would be removing part of her joint in the fifth toe to be able to straighten it out and alleviate pressure to the toe, she will be in a post op shoe with the foot wrapped for 3 weeks. I would be happy to discuss this with her if she has another number to call or if her number starts working      Called and gave that information to her , she would like to Dr Treadwell to call her back . She said she feels little bit better after  I talked to her.

## 2025-06-26 NOTE — TELEPHONE ENCOUNTER
Patient would like to know how long she she will be out? She has to return to work on 8/9/25.   Wants to know what are you going to do during the surgery?

## 2025-07-02 ENCOUNTER — ANESTHESIA EVENT (OUTPATIENT)
Dept: SURGERY | Facility: HOSPITAL | Age: 60
End: 2025-07-02
Payer: COMMERCIAL

## 2025-07-03 ENCOUNTER — ANESTHESIA (OUTPATIENT)
Dept: SURGERY | Facility: HOSPITAL | Age: 60
End: 2025-07-03
Payer: COMMERCIAL

## 2025-07-03 ENCOUNTER — HOSPITAL ENCOUNTER (OUTPATIENT)
Facility: HOSPITAL | Age: 60
Discharge: HOME | End: 2025-07-03
Attending: PODIATRIST | Admitting: PODIATRIST
Payer: COMMERCIAL

## 2025-07-03 VITALS
BODY MASS INDEX: 30.8 KG/M2 | OXYGEN SATURATION: 100 % | TEMPERATURE: 98 F | HEART RATE: 68 BPM | SYSTOLIC BLOOD PRESSURE: 147 MMHG | WEIGHT: 220 LBS | RESPIRATION RATE: 16 BRPM | DIASTOLIC BLOOD PRESSURE: 86 MMHG | HEIGHT: 71 IN

## 2025-07-03 DIAGNOSIS — M20.5X2 ADDUCTOVARUS ROTATION OF TOE, ACQUIRED, LEFT: Primary | ICD-10-CM

## 2025-07-03 DIAGNOSIS — M20.5X2 ACQUIRED ADDUCTOVARUS ROTATION OF TOE OF LEFT FOOT: ICD-10-CM

## 2025-07-03 PROCEDURE — 37000009 HC ANESTHESIA EA ADD 15 MINS: Mod: PO | Performed by: PODIATRIST

## 2025-07-03 PROCEDURE — 63600175 PHARM REV CODE 636 W HCPCS: Mod: PO | Performed by: NURSE ANESTHETIST, CERTIFIED REGISTERED

## 2025-07-03 PROCEDURE — 36000707: Mod: PO | Performed by: PODIATRIST

## 2025-07-03 PROCEDURE — 36000706: Mod: PO | Performed by: PODIATRIST

## 2025-07-03 PROCEDURE — 63600175 PHARM REV CODE 636 W HCPCS: Mod: PO | Performed by: PODIATRIST

## 2025-07-03 PROCEDURE — 27201423 OPTIME MED/SURG SUP & DEVICES STERILE SUPPLY: Mod: PO | Performed by: PODIATRIST

## 2025-07-03 PROCEDURE — 37000008 HC ANESTHESIA 1ST 15 MINUTES: Mod: PO | Performed by: PODIATRIST

## 2025-07-03 PROCEDURE — 71000015 HC POSTOP RECOV 1ST HR: Mod: PO | Performed by: PODIATRIST

## 2025-07-03 PROCEDURE — 28285 REPAIR OF HAMMERTOE: CPT | Mod: T4,,, | Performed by: PODIATRIST

## 2025-07-03 PROCEDURE — 63600175 PHARM REV CODE 636 W HCPCS: Mod: PO | Performed by: ANESTHESIOLOGY

## 2025-07-03 RX ORDER — PROPOFOL 10 MG/ML
VIAL (ML) INTRAVENOUS
Status: DISCONTINUED | OUTPATIENT
Start: 2025-07-03 | End: 2025-07-03

## 2025-07-03 RX ORDER — LIDOCAINE HYDROCHLORIDE 10 MG/ML
INJECTION, SOLUTION EPIDURAL; INFILTRATION; INTRACAUDAL; PERINEURAL
Status: DISCONTINUED | OUTPATIENT
Start: 2025-07-03 | End: 2025-07-03 | Stop reason: HOSPADM

## 2025-07-03 RX ORDER — PROPOFOL 10 MG/ML
VIAL (ML) INTRAVENOUS CONTINUOUS PRN
Status: DISCONTINUED | OUTPATIENT
Start: 2025-07-03 | End: 2025-07-03

## 2025-07-03 RX ORDER — DIPHENHYDRAMINE HYDROCHLORIDE 50 MG/ML
25 INJECTION, SOLUTION INTRAMUSCULAR; INTRAVENOUS EVERY 6 HOURS PRN
Status: DISCONTINUED | OUTPATIENT
Start: 2025-07-03 | End: 2025-07-03 | Stop reason: HOSPADM

## 2025-07-03 RX ORDER — PROCHLORPERAZINE EDISYLATE 5 MG/ML
5 INJECTION INTRAMUSCULAR; INTRAVENOUS EVERY 4 HOURS PRN
Status: DISCONTINUED | OUTPATIENT
Start: 2025-07-03 | End: 2025-07-03 | Stop reason: HOSPADM

## 2025-07-03 RX ORDER — OXYCODONE HYDROCHLORIDE 5 MG/1
5 TABLET ORAL
Status: DISCONTINUED | OUTPATIENT
Start: 2025-07-03 | End: 2025-07-03 | Stop reason: HOSPADM

## 2025-07-03 RX ORDER — MIDAZOLAM HYDROCHLORIDE 1 MG/ML
INJECTION INTRAMUSCULAR; INTRAVENOUS
Status: DISCONTINUED | OUTPATIENT
Start: 2025-07-03 | End: 2025-07-03

## 2025-07-03 RX ORDER — LIDOCAINE HYDROCHLORIDE 20 MG/ML
INJECTION INTRAVENOUS
Status: DISCONTINUED | OUTPATIENT
Start: 2025-07-03 | End: 2025-07-03

## 2025-07-03 RX ORDER — HYDROCODONE BITARTRATE AND ACETAMINOPHEN 5; 325 MG/1; MG/1
1 TABLET ORAL EVERY 4 HOURS PRN
Qty: 20 TABLET | Refills: 0 | Status: SHIPPED | OUTPATIENT
Start: 2025-07-03

## 2025-07-03 RX ORDER — ACETAMINOPHEN 10 MG/ML
INJECTION, SOLUTION INTRAVENOUS
Status: DISCONTINUED | OUTPATIENT
Start: 2025-07-03 | End: 2025-07-03

## 2025-07-03 RX ORDER — ONDANSETRON HYDROCHLORIDE 2 MG/ML
INJECTION, SOLUTION INTRAVENOUS
Status: DISCONTINUED | OUTPATIENT
Start: 2025-07-03 | End: 2025-07-03

## 2025-07-03 RX ORDER — LIDOCAINE HYDROCHLORIDE 10 MG/ML
1 INJECTION, SOLUTION EPIDURAL; INFILTRATION; INTRACAUDAL; PERINEURAL ONCE
Status: DISCONTINUED | OUTPATIENT
Start: 2025-07-03 | End: 2025-07-03 | Stop reason: HOSPADM

## 2025-07-03 RX ORDER — GLUCAGON 1 MG
1 KIT INJECTION
Status: DISCONTINUED | OUTPATIENT
Start: 2025-07-03 | End: 2025-07-03 | Stop reason: HOSPADM

## 2025-07-03 RX ORDER — CLINDAMYCIN PHOSPHATE 600 MG/50ML
600 INJECTION, SOLUTION INTRAVENOUS ONCE
Status: COMPLETED | OUTPATIENT
Start: 2025-07-03 | End: 2025-07-03

## 2025-07-03 RX ORDER — FENTANYL CITRATE 50 UG/ML
INJECTION, SOLUTION INTRAMUSCULAR; INTRAVENOUS
Status: DISCONTINUED | OUTPATIENT
Start: 2025-07-03 | End: 2025-07-03

## 2025-07-03 RX ORDER — SODIUM CHLORIDE, SODIUM LACTATE, POTASSIUM CHLORIDE, CALCIUM CHLORIDE 600; 310; 30; 20 MG/100ML; MG/100ML; MG/100ML; MG/100ML
INJECTION, SOLUTION INTRAVENOUS CONTINUOUS
Status: DISCONTINUED | OUTPATIENT
Start: 2025-07-03 | End: 2025-07-03 | Stop reason: HOSPADM

## 2025-07-03 RX ORDER — CLINDAMYCIN PHOSPHATE 600 MG/50ML
600 INJECTION, SOLUTION INTRAVENOUS ONCE
Status: DISCONTINUED | OUTPATIENT
Start: 2025-07-03 | End: 2025-07-03

## 2025-07-03 RX ORDER — FENTANYL CITRATE 50 UG/ML
25 INJECTION, SOLUTION INTRAMUSCULAR; INTRAVENOUS EVERY 5 MIN PRN
Status: DISCONTINUED | OUTPATIENT
Start: 2025-07-03 | End: 2025-07-03 | Stop reason: HOSPADM

## 2025-07-03 RX ORDER — BUPIVACAINE HYDROCHLORIDE 2.5 MG/ML
INJECTION, SOLUTION EPIDURAL; INFILTRATION; INTRACAUDAL; PERINEURAL
Status: DISCONTINUED | OUTPATIENT
Start: 2025-07-03 | End: 2025-07-03 | Stop reason: HOSPADM

## 2025-07-03 RX ORDER — HYDROMORPHONE HYDROCHLORIDE 2 MG/ML
0.2 INJECTION, SOLUTION INTRAMUSCULAR; INTRAVENOUS; SUBCUTANEOUS EVERY 5 MIN PRN
Status: DISCONTINUED | OUTPATIENT
Start: 2025-07-03 | End: 2025-07-03 | Stop reason: HOSPADM

## 2025-07-03 RX ORDER — SODIUM CHLORIDE 0.9 % (FLUSH) 0.9 %
10 SYRINGE (ML) INJECTION ONCE
Status: DISCONTINUED | OUTPATIENT
Start: 2025-07-03 | End: 2025-07-03 | Stop reason: HOSPADM

## 2025-07-03 RX ADMIN — CLINDAMYCIN IN 5 PERCENT DEXTROSE 600 MG: 12 INJECTION, SOLUTION INTRAVENOUS at 07:07

## 2025-07-03 RX ADMIN — ONDANSETRON 4 MG: 2 INJECTION, SOLUTION INTRAMUSCULAR; INTRAVENOUS at 07:07

## 2025-07-03 RX ADMIN — PROPOFOL 100 MCG/KG/MIN: 10 INJECTION, EMULSION INTRAVENOUS at 07:07

## 2025-07-03 RX ADMIN — MIDAZOLAM HYDROCHLORIDE 2 MG: 2 INJECTION, SOLUTION INTRAMUSCULAR; INTRAVENOUS at 07:07

## 2025-07-03 RX ADMIN — SODIUM CHLORIDE, POTASSIUM CHLORIDE, SODIUM LACTATE AND CALCIUM CHLORIDE: 600; 310; 30; 20 INJECTION, SOLUTION INTRAVENOUS at 07:07

## 2025-07-03 RX ADMIN — PROPOFOL 70 MG: 10 INJECTION, EMULSION INTRAVENOUS at 07:07

## 2025-07-03 RX ADMIN — ACETAMINOPHEN 1000 MG: 10 INJECTION INTRAVENOUS at 08:07

## 2025-07-03 RX ADMIN — LIDOCAINE HYDROCHLORIDE 100 MG: 20 INJECTION INTRAVENOUS at 07:07

## 2025-07-03 RX ADMIN — FENTANYL CITRATE 50 MCG: 50 INJECTION, SOLUTION INTRAMUSCULAR; INTRAVENOUS at 07:07

## 2025-07-03 NOTE — TRANSFER OF CARE
"Anesthesia Transfer of Care Note    Patient: Anel Olivas Schmitt    Procedure(s) Performed: Procedure(s) (LRB):  CORRECTION, HAMMER TOE (Left)    Patient location: PACU    Anesthesia Type: MAC    Transport from OR: Transported from OR on room air with adequate spontaneous ventilation    Post pain: adequate analgesia    Post assessment: no apparent anesthetic complications and tolerated procedure well    Post vital signs: stable    Level of consciousness: awake, alert and oriented    Nausea/Vomiting: no nausea/vomiting    Complications: none    Transfer of care protocol was followed    Last vitals: Visit Vitals  /72   Pulse 83   Temp 36.5 °C (97.7 °F) (Skin)   Resp 14   Ht 5' 11" (1.803 m)   Wt 99.8 kg (220 lb)   SpO2 100%   Breastfeeding No   BMI 30.68 kg/m²     "

## 2025-07-03 NOTE — BRIEF OP NOTE
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    DISPOSITION: Home or Self Care    FINAL DIAGNOSIS:  Adductovarus rotation of toe, acquired, left    FOLLOWUP: In clinic    DISCHARGE INSTRUCTIONS:    Discharge Procedure Orders   Diet general     Keep surgical extremity elevated     Ice to affected area   Order Comments: Ice behind the calf 15 minute intervals as needed for pain and swelling     Call MD for:  temperature >100.4     Call MD for:  persistent nausea and vomiting     Call MD for:  severe uncontrolled pain     Call MD for:  difficulty breathing, headache or visual disturbances     Call MD for:  redness, tenderness, or signs of infection (pain, swelling, redness, odor or green/yellow discharge around incision site)     Call MD for:  hives     Call MD for:  persistent dizziness or light-headedness     Call MD for:  extreme fatigue     Leave dressing on - Keep it clean, dry, and intact until clinic visit     Weight bearing restrictions (specify)   Order Comments: Weight bearing in post op shoe only     Sandeep Treadwell DPM

## 2025-07-03 NOTE — DISCHARGE INSTRUCTIONS
FOOT SURGERY  After surgery:    DO:  Keep leg elevated until first post operative visit.  Keep ice pack on foot for 20 minutes each hour while awake for next 24-48 hours.  Keep dressing clean and dry. Do not change the bandages.  Advance diet as tolerated.   Check circulation frequently in toes by pressing down on toenail. Nail should turn white and then pink WITHIN 3 SECONDS when released.  Wear surgical shoe/boot.   Do not walk without shoe/boot.  Resume home medications.    DO NOT:  Do not remove your dressing.  Do not get dressing wet.  Do not drive until released by your doctor.  Do not take additional tylenol/acetaminophen while taking narcotic pain medication that contains tylenol/acetaminophen.     CALL PHYSICIAN FOR:  Burning, or numbness of the toes not relieved by elevation of the leg.  Pale or cold toes.  Blue colored nail beds.  Redness, swelling, or bleeding.  Fever greater than 101,  Drainage (pus) from the operative site.  Pain unrelieved by pain medication.    FOR EMERGENCIES CONTACT YOUR PHYSICIAN'S OFFICE  [phone]

## 2025-07-03 NOTE — OP NOTE
[facility] - Surgery   Operative Note     Surgery Date: 7/3/2025      Surgeons and Role:     * Sandeep Treadwell DPM - Primary     Assisting Surgeon: Skip GOOD     Pre-op Diagnosis:  Adductovarus rotation of toe, acquired, left [M20.5X2]  Hammer toe of left foot [M20.42]  Corn or callus [L84]     Post-op Diagnosis:  Post-Op Diagnosis Codes:     * Adductovarus rotation of toe, acquired, left [M20.5X2]     * Hammer toe of left foot [M20.42]     * Corn or callus [L84]     Procedure(s) (LRB):  CORRECTION, HAMMER TOE (Left)     Anesthesia: Local MAC    Hemostasis:  Ankle tourniquet set at 250 mmHg for 15 minutes    Estimated Blood Loss: 5 mL    Specimen: None    Culture: None    Complications: None    Condition: Stable    PRE-PROCEDURE:   The patient was brought into the operating room and placed on the operating table in the supine position. Following IV sedation, local anesthesia was obtained utilizing 8 cc's of a 1:1 mixture of 1% Lidocaine plain and 0.5% Marcaine plain in an ankle block. The foot and leg was then scrubbed, prepped, and draped in the usual aseptic manner.     PROCEDURE:   Attention was directed to the dorsal left fifth digit where two converging semielliptical incisions were made in a proximal-lateral to distal-medial direction with about a 0.5 cm x 1.5 cm fusiform shaped section of skin was removed over the DIPJ. Sharp and blunt dissection was then utilized to deepen the wound to the level of the DIPJ. The joint was entered into via a tranverse incision. The capsular tissue was then reflected from the dorsal, medial, lateral, and plantar aspect of the head of the middle phalanx. Utilizing a bone saw, the head of the proximal phalanx was resected and removed from the wound. The bone exostosis was removed from the base of the distal phalanx with the oscillating saw and the remaining bone was rasped smooth with the saw. The incision was then closed with 3-0 vicryl and 3-0 nylon suture derotating  the toe and straightening it.    The incision was dressed with xeroform and covered with a sterile compressive dressing consisting of 4 X 4s and cast padding. An ACE wrap and post-op shoe was then applied. The patient tolerated the procedure and anesthesia well. The patient was transported to recovery with vital signs stable and vascular status intact. She will follow up in clinic in 1 week, dressings to remain c/d/I. Post op instructions were dispensed at discharge.    Sandeep Treadwell DPM

## 2025-07-03 NOTE — ANESTHESIA POSTPROCEDURE EVALUATION
Anesthesia Post Evaluation    Patient: Anel Olivas Schmitt    Procedure(s) Performed: Procedure(s) (LRB):  CORRECTION, HAMMER TOE (Left)    Final Anesthesia Type: MAC      Patient location during evaluation: PACU  Patient participation: Yes- Able to Participate  Level of consciousness: awake and alert and oriented  Post-procedure vital signs: reviewed and stable  Pain management: adequate  Airway patency: patent    PONV status at discharge: No PONV  Anesthetic complications: no      Cardiovascular status: blood pressure returned to baseline  Respiratory status: unassisted, spontaneous ventilation and room air  Hydration status: euvolemic  Follow-up not needed.              Vitals Value Taken Time   /86 07/03/25 08:33   Temp  07/03/25 08:49   Pulse 71 07/03/25 08:33   Resp 16 07/03/25 08:33   SpO2 100 % 07/03/25 08:33         Event Time   Out of Recovery 08:23:53         Pain/Rhett Score: Rhett Score: 8 (7/3/2025  8:41 AM)

## 2025-07-03 NOTE — PROGRESS NOTES
S: Patient presents for left fifth toe derotational arthroplasty as planned, failed conservative treatments with wider shoes and toe spacers and is here to have the toe surgically fixed, no new complaints.  O:  Physical Exam  Constitutional:       General: She is not in acute distress.     Appearance: She is well-developed. She is not diaphoretic.   Cardiovascular:      Pulses:           Dorsalis pedis pulses are 2+ on the right side and 2+ on the left side.        Posterior tibial pulses are 2+ on the right side and 2+ on the left side.      Comments: < 3 sec capillary refill time to toes 1-5 bilateral. Toes and feet are warm to touch proximally with normal distal cooling b/l. There is some hair growth on the feet and toes b/l. There is no edema b/l. No spider veins or varicosities present b/l.      Musculoskeletal:      Comments: Equinus noted b/l ankles with < 10 deg DF noted. MMT 5/5 in DF/PF/Inv/Ev resistance with no reproduction of pain in any direction. Passive range of motion of ankle and pedal joints is painless b/l.     Left fifth toe non reducible adductovarus rotation with PIPJ contracture and dorsal PIPJ hyperkeratotic lesion with pain to palpation   Skin:     General: Skin is warm and dry.      Coloration: Skin is not pale.      Findings: No abrasion, bruising, burn, ecchymosis, erythema, laceration, lesion, petechiae or rash.      Nails: There is no clubbing.      Comments: Skin temperature, texture and turgor within normal limits.   Neurological:      Mental Status: She is alert and oriented to person, place, and time.      Sensory: No sensory deficit.      Motor: No tremor, atrophy or abnormal muscle tone.      Comments: Negative tinel sign bilateral.   Psychiatric:         Behavior: Behavior normal.          Assessment:      Assessment       Encounter Diagnoses   Name Primary?    Adductovarus rotation of toe, acquired, left Yes    Hammer toe of left foot      Corn or callus      P:  The patient has  been examined and the previous podiatry progress note has been reviewed:    I concur with the findings and no changes have occurred since my previous progress note was written.    Surgery risks, benefits and alternative options discussed and understood by patient/family.    Proceed as discussed consent reviewed and proper site confirmed and marked    Sandeep Treadwell DPM

## 2025-07-03 NOTE — ANESTHESIA PREPROCEDURE EVALUATION
07/03/2025  Anel Schmitt is a 60 y.o., female.      Pre-op Assessment    I have reviewed the NPO Status.   I have reviewed the Medications.     Review of Systems  Anesthesia Hx:  No problems with previous Anesthesia                Cardiovascular:     Hypertension           hyperlipidemia   ECG has been reviewed.                      Hypertension         Renal/:  Chronic Renal Disease   Renal cyst     Kidney Function/Disease             Hepatic/GI:     GERD         Gerd          Neurological:    Neuromuscular Disease,  Headaches      Dx of Headaches                         Neuromuscular Disease   Endocrine:   Hypothyroidism       Hypothyroidism          Psych:  Psychiatric History anxiety depression              Physical Exam  General: Well nourished    Airway:  Mallampati: II   Mouth Opening: Normal  TM Distance: Normal  Tongue: Normal  Neck ROM: Normal ROM    Dental:  Intact    Chest/Lungs:  Clear to auscultation, Normal Respiratory Rate    Anesthesia Plan  Type of Anesthesia, risks & benefits discussed:    Anesthesia Type: Gen Natural Airway  Intra-op Monitoring Plan: Standard ASA Monitors  Post Op Pain Control Plan: multimodal analgesia and IV/PO Opioids PRN  Induction:  IV  Informed Consent: Informed consent signed with the Patient and all parties understand the risks and agree with anesthesia plan.  All questions answered. Patient consented to blood products? No  ASA Score: 3    Ready For Surgery From Anesthesia Perspective.   .

## 2025-07-09 ENCOUNTER — OFFICE VISIT (OUTPATIENT)
Dept: PODIATRY | Facility: CLINIC | Age: 60
End: 2025-07-09
Payer: COMMERCIAL

## 2025-07-09 VITALS — HEIGHT: 71 IN | BODY MASS INDEX: 30.8 KG/M2 | WEIGHT: 220 LBS

## 2025-07-09 DIAGNOSIS — Z98.890 POST-OPERATIVE STATE: Primary | ICD-10-CM

## 2025-07-09 PROCEDURE — 99999 PR PBB SHADOW E&M-EST. PATIENT-LVL IV: CPT | Mod: PBBFAC,,, | Performed by: PODIATRIST

## 2025-07-09 NOTE — PROGRESS NOTES
Subjective:      Patient ID: Anel Schmitt is a 60 y.o. female.    Chief Complaint: Post-op Evaluation (Post op eval)    Anel is a 60 y.o. female who presents to the podiatry clinic  with complaint of  left foot pain with the fifth toe associated with corn to the toe and pressure. Onset of the symptoms was several years ago. Precipitating event: none known. Current symptoms include: ability to bear weight, but with some pain. Aggravating factors: shoes. Symptoms have gradually worsened. Patient has had prior foot problems. She has had the corn trimmed down but it comes right back, she had surgery on the toe a few years ago but it did not help    5/27/25: patient returns for follow up left foot fifth toe pain, she is here to set up surgery to repair the hammertoe as discussed last appointment    7/9/25: Patient returns for post op 1 week left fifth toe derotational arthroplasty in darco shoe and football wrap    Review of Systems   Constitutional: Negative for chills and fever.   Cardiovascular:  Negative for claudication and leg swelling.   Respiratory:  Negative for shortness of breath.    Skin:  Negative for itching, nail changes and rash.   Musculoskeletal:  Negative for muscle cramps, muscle weakness and myalgias.   Gastrointestinal:  Negative for nausea and vomiting.   Neurological:  Negative for focal weakness, loss of balance, numbness and paresthesias.           Objective:      Physical Exam  Constitutional:       General: She is not in acute distress.     Appearance: She is well-developed. She is not diaphoretic.   Cardiovascular:      Pulses:           Dorsalis pedis pulses are 2+ on the right side and 2+ on the left side.        Posterior tibial pulses are 2+ on the right side and 2+ on the left side.      Comments: < 3 sec capillary refill time to toes 1-5 bilateral. Toes and feet are warm to touch proximally with normal distal cooling b/l. There is some hair growth on the feet and toes b/l. There  is no edema b/l. No spider veins or varicosities present b/l.     Musculoskeletal:      Comments: Equinus noted b/l ankles with < 10 deg DF noted. MMT 5/5 in DF/PF/Inv/Ev resistance with no reproduction of pain in any direction. Passive range of motion of ankle and pedal joints is painless b/l.    Left fifth toe sits in a straighter position   Skin:     General: Skin is warm and dry.      Coloration: Skin is not pale.      Findings: No abrasion, bruising, burn, ecchymosis, erythema, laceration, lesion, petechiae or rash.      Nails: There is no clubbing.      Comments: Skin temperature, texture and turgor within normal limits.    Left fifth toe incision well coapted with sutures intact no erythema, no dehiscence or drainage   Neurological:      Mental Status: She is alert and oriented to person, place, and time.      Sensory: No sensory deficit.      Motor: No tremor, atrophy or abnormal muscle tone.      Comments: Negative tinel sign bilateral.   Psychiatric:         Behavior: Behavior normal.               Assessment:       Encounter Diagnosis   Name Primary?    Post-operative state Yes           Plan:       Anel was seen today for post-op evaluation.    Diagnoses and all orders for this visit:    Post-operative state        I counseled the patient on her conditions, their implications and medical management.    Dressed again in xeroform and gauze and football wraps and darco shoe for ambulation    Return in 1 week for suture removal        Sandeep Treadwell DPM

## 2025-07-16 ENCOUNTER — OFFICE VISIT (OUTPATIENT)
Dept: PODIATRY | Facility: CLINIC | Age: 60
End: 2025-07-16
Payer: COMMERCIAL

## 2025-07-16 VITALS — BODY MASS INDEX: 30.8 KG/M2 | HEIGHT: 71 IN | WEIGHT: 220 LBS

## 2025-07-16 DIAGNOSIS — Z98.890 POST-OPERATIVE STATE: Primary | ICD-10-CM

## 2025-07-16 PROCEDURE — 99999 PR PBB SHADOW E&M-EST. PATIENT-LVL IV: CPT | Mod: PBBFAC,,, | Performed by: PODIATRIST

## 2025-07-16 NOTE — PROGRESS NOTES
Subjective:      Patient ID: Anel Schmitt is a 60 y.o. female.    Chief Complaint: Post-op Evaluation    Anel is a 60 y.o. female who presents to the podiatry clinic  with complaint of  left foot pain with the fifth toe associated with corn to the toe and pressure. Onset of the symptoms was several years ago. Precipitating event: none known. Current symptoms include: ability to bear weight, but with some pain. Aggravating factors: shoes. Symptoms have gradually worsened. Patient has had prior foot problems. She has had the corn trimmed down but it comes right back, she had surgery on the toe a few years ago but it did not help    5/27/25: patient returns for follow up left foot fifth toe pain, she is here to set up surgery to repair the hammertoe as discussed last appointment    7/9/25: Patient returns for post op 1 week left fifth toe derotational arthroplasty in darco shoe and football wrap    7/16/25: Patient returns 2 weeks post op left fifth toe derotational arthroplasty in darco shoe no new complaints    Review of Systems   Constitutional: Negative for chills and fever.   Cardiovascular:  Negative for claudication and leg swelling.   Respiratory:  Negative for shortness of breath.    Skin:  Negative for itching, nail changes and rash.   Musculoskeletal:  Negative for muscle cramps, muscle weakness and myalgias.   Gastrointestinal:  Negative for nausea and vomiting.   Neurological:  Negative for focal weakness, loss of balance, numbness and paresthesias.           Objective:      Physical Exam  Constitutional:       General: She is not in acute distress.     Appearance: She is well-developed. She is not diaphoretic.   Cardiovascular:      Pulses:           Dorsalis pedis pulses are 2+ on the right side and 2+ on the left side.        Posterior tibial pulses are 2+ on the right side and 2+ on the left side.      Comments: < 3 sec capillary refill time to toes 1-5 bilateral. Toes and feet are warm to touch  proximally with normal distal cooling b/l. There is some hair growth on the feet and toes b/l. There is no edema b/l. No spider veins or varicosities present b/l.     Musculoskeletal:      Comments: Equinus noted b/l ankles with < 10 deg DF noted. MMT 5/5 in DF/PF/Inv/Ev resistance with no reproduction of pain in any direction. Passive range of motion of ankle and pedal joints is painless b/l.    Left fifth toe sits in a straighter position   Skin:     General: Skin is warm and dry.      Coloration: Skin is not pale.      Findings: No abrasion, bruising, burn, ecchymosis, erythema, laceration, lesion, petechiae or rash.      Nails: There is no clubbing.      Comments: Skin temperature, texture and turgor within normal limits.    Left fifth toe incision well coapted with sutures intact no erythema, no dehiscence or drainage   Neurological:      Mental Status: She is alert and oriented to person, place, and time.      Sensory: No sensory deficit.      Motor: No tremor, atrophy or abnormal muscle tone.      Comments: Negative tinel sign bilateral.   Psychiatric:         Behavior: Behavior normal.               Assessment:       Encounter Diagnosis   Name Primary?    Post-operative state Yes             Plan:       Anel was seen today for post-op evaluation.    Diagnoses and all orders for this visit:    Post-operative state          I counseled the patient on her conditions, their implications and medical management.    Sutures removed    Return to normal shoes to toleration        Sandeep Treadwell DPM

## 2025-07-21 ENCOUNTER — TELEPHONE (OUTPATIENT)
Dept: PODIATRY | Facility: CLINIC | Age: 60
End: 2025-07-21
Payer: COMMERCIAL

## 2025-07-22 ENCOUNTER — TELEPHONE (OUTPATIENT)
Dept: FAMILY MEDICINE | Facility: CLINIC | Age: 60
End: 2025-07-22
Payer: COMMERCIAL

## 2025-07-22 DIAGNOSIS — K92.89 GAS BLOAT SYNDROME: ICD-10-CM

## 2025-07-22 RX ORDER — SIMETHICONE 125 MG
125 CAPSULE ORAL 4 TIMES DAILY PRN
Qty: 90 CAPSULE | Refills: 2 | Status: SHIPPED | OUTPATIENT
Start: 2025-07-22

## 2025-07-22 NOTE — TELEPHONE ENCOUNTER
Copied from CRM #[id number]. Topic: Medications - New Medication Request  >> Jul 22, 2025  2:42 PM Kimberly wrote:  Type:  Patient Requesting a call back     Who Called:Anel  What is the call back request regarding?:would like to speak to MD or nurse about being prescribed something for gas   Would the patient rather a call back or a response via [facility]?call  Best Call Back Number:[phone]    Additional Information:

## 2025-07-22 NOTE — TELEPHONE ENCOUNTER
Last time you were in clinic, your blood pressure was HIGH. Please check at home and send updated readings.        Medications Ordered This Encounter   Medications    simethicone (MYLICON) 125 mg Cap capsule     Sig: Take 1 capsule (125 mg total) by mouth 4 (four) times daily as needed for Flatulence.     Dispense:  90 capsule     Refill:  2

## 2025-07-22 NOTE — TELEPHONE ENCOUNTER
Returned patient's call.    She is requesting a medication that can help with gas relief.    She complains of being bothered by this since having her teeth pulled.

## 2025-07-23 ENCOUNTER — OFFICE VISIT (OUTPATIENT)
Dept: PODIATRY | Facility: CLINIC | Age: 60
End: 2025-07-23
Payer: COMMERCIAL

## 2025-07-23 VITALS — WEIGHT: 220 LBS | BODY MASS INDEX: 30.8 KG/M2 | HEIGHT: 71 IN

## 2025-07-23 DIAGNOSIS — R20.2 PARESTHESIA: ICD-10-CM

## 2025-07-23 DIAGNOSIS — Z98.890 POST-OPERATIVE STATE: Primary | ICD-10-CM

## 2025-07-23 DIAGNOSIS — G60.9 IDIOPATHIC PERIPHERAL NEUROPATHY: ICD-10-CM

## 2025-07-23 PROCEDURE — 99999 PR PBB SHADOW E&M-EST. PATIENT-LVL IV: CPT | Mod: PBBFAC,,, | Performed by: PODIATRIST

## 2025-07-23 RX ORDER — FLUOCINOLONE ACETONIDE 0.11 MG/ML
OIL TOPICAL
COMMUNITY
Start: 2025-05-16

## 2025-07-23 RX ORDER — ACETAMINOPHEN AND CODEINE PHOSPHATE 300; 30 MG/1; MG/1
1 TABLET ORAL EVERY 4 HOURS PRN
COMMUNITY
Start: 2025-05-21

## 2025-07-23 RX ORDER — PREGABALIN 50 MG/1
50 CAPSULE ORAL 2 TIMES DAILY
Qty: 180 CAPSULE | Refills: 3 | Status: SHIPPED | OUTPATIENT
Start: 2025-07-23

## 2025-07-23 NOTE — PROGRESS NOTES
Subjective:      Patient ID: Anel Schmitt is a 60 y.o. female.    Chief Complaint: Post-op Evaluation    Anel is a 60 y.o. female who presents to the podiatry clinic  with complaint of  left foot pain with the fifth toe associated with corn to the toe and pressure. Onset of the symptoms was several years ago. Precipitating event: none known. Current symptoms include: ability to bear weight, but with some pain. Aggravating factors: shoes. Symptoms have gradually worsened. Patient has had prior foot problems. She has had the corn trimmed down but it comes right back, she had surgery on the toe a few years ago but it did not help    5/27/25: patient returns for follow up left foot fifth toe pain, she is here to set up surgery to repair the hammertoe as discussed last appointment    7/9/25: Patient returns for post op 1 week left fifth toe derotational arthroplasty in darco shoe and football wrap    7/16/25: Patient returns 2 weeks post op left fifth toe derotational arthroplasty in darco shoe no new complaints    7/23/25: Patient returns for 3 weeks post op left fifth toe derotational arthroplasty has tingling and burning along the lateral foot and anterior leg and the toe is still sensitive to the touch    Review of Systems   Constitutional: Negative for chills and fever.   Cardiovascular:  Negative for claudication and leg swelling.   Respiratory:  Negative for shortness of breath.    Skin:  Negative for itching, nail changes and rash.   Musculoskeletal:  Negative for muscle cramps, muscle weakness and myalgias.   Gastrointestinal:  Negative for nausea and vomiting.   Neurological:  Negative for focal weakness, loss of balance, numbness and paresthesias.           Objective:      Physical Exam  Constitutional:       General: She is not in acute distress.     Appearance: She is well-developed. She is not diaphoretic.   Cardiovascular:      Pulses:           Dorsalis pedis pulses are 2+ on the right side and 2+  on the left side.        Posterior tibial pulses are 2+ on the right side and 2+ on the left side.      Comments: < 3 sec capillary refill time to toes 1-5 bilateral. Toes and feet are warm to touch proximally with normal distal cooling b/l. There is some hair growth on the feet and toes b/l. There is no edema b/l. No spider veins or varicosities present b/l.     Musculoskeletal:      Comments: Equinus noted b/l ankles with < 10 deg DF noted. MMT 5/5 in DF/PF/Inv/Ev resistance with no reproduction of pain in any direction. Passive range of motion of ankle and pedal joints is painless b/l.    Left fifth toe sits in a straighter position   Skin:     General: Skin is warm and dry.      Coloration: Skin is not pale.      Findings: No abrasion, bruising, burn, ecchymosis, erythema, laceration, lesion, petechiae or rash.      Nails: There is no clubbing.      Comments: Skin temperature, texture and turgor within normal limits.    Left fifth toe incision well healed but sensitive to palpation    Paresthesias to palpation along the base of the fifth metatarsal and proximal along the sural nerve   Neurological:      Mental Status: She is alert and oriented to person, place, and time.      Sensory: No sensory deficit.      Motor: No tremor, atrophy or abnormal muscle tone.      Comments: Negative tinel sign bilateral.   Psychiatric:         Behavior: Behavior normal.               Assessment:       Encounter Diagnoses   Name Primary?    Post-operative state Yes    Paresthesia     Idiopathic peripheral neuropathy              Plan:       Anel was seen today for post-op evaluation.    Diagnoses and all orders for this visit:    Post-operative state  -     pregabalin (LYRICA) 50 MG capsule; Take 1 capsule (50 mg total) by mouth 2 (two) times daily.    Paresthesia  -     pregabalin (LYRICA) 50 MG capsule; Take 1 capsule (50 mg total) by mouth 2 (two) times daily.    Idiopathic peripheral neuropathy          I counseled the  patient on her conditions, their implications and medical management.    Incision is healed and should become less sensitive with time, she has neuropathy down the left leg and lateral foot I will start her back on lyrica to see if the pain improves in the leg and foot with medication    Return in 4 weeks for follow up        Sandeep Treadwell DPM

## 2025-07-27 NOTE — TELEPHONE ENCOUNTER
----- Message from Katelyn sent at 12/10/2024 11:38 AM CST -----  Contact: Emanuel with [facility] phone [phone]  Pharmacy is calling to clarify an RX.    RX name:  semaglutide, weight loss, (WEGOVY) 1.7 mg/0.75 mL PnIj    What do they need to clarify:      Comments: Patient wants to change to tirzepatide (Monjaro) compound 5 mg  
Never smoker

## 2025-07-28 ENCOUNTER — OFFICE VISIT (OUTPATIENT)
Dept: FAMILY MEDICINE | Facility: CLINIC | Age: 60
End: 2025-07-28
Payer: COMMERCIAL

## 2025-07-28 VITALS
HEIGHT: 71 IN | RESPIRATION RATE: 14 BRPM | SYSTOLIC BLOOD PRESSURE: 118 MMHG | DIASTOLIC BLOOD PRESSURE: 78 MMHG | BODY MASS INDEX: 30.8 KG/M2 | OXYGEN SATURATION: 98 % | HEART RATE: 84 BPM | WEIGHT: 220 LBS

## 2025-07-28 DIAGNOSIS — M25.512 ACUTE PAIN OF LEFT SHOULDER: Primary | ICD-10-CM

## 2025-07-28 DIAGNOSIS — I10 ESSENTIAL HYPERTENSION: ICD-10-CM

## 2025-07-28 DIAGNOSIS — E78.5 HYPERLIPIDEMIA, UNSPECIFIED HYPERLIPIDEMIA TYPE: ICD-10-CM

## 2025-07-28 DIAGNOSIS — K21.9 GASTROESOPHAGEAL REFLUX DISEASE WITHOUT ESOPHAGITIS: ICD-10-CM

## 2025-07-28 PROCEDURE — 3008F BODY MASS INDEX DOCD: CPT | Mod: CPTII,S$GLB,, | Performed by: NURSE PRACTITIONER

## 2025-07-28 PROCEDURE — 99214 OFFICE O/P EST MOD 30 MIN: CPT | Mod: S$GLB,,, | Performed by: NURSE PRACTITIONER

## 2025-07-28 PROCEDURE — 3078F DIAST BP <80 MM HG: CPT | Mod: CPTII,S$GLB,, | Performed by: NURSE PRACTITIONER

## 2025-07-28 PROCEDURE — 3074F SYST BP LT 130 MM HG: CPT | Mod: CPTII,S$GLB,, | Performed by: NURSE PRACTITIONER

## 2025-07-28 PROCEDURE — 1159F MED LIST DOCD IN RCRD: CPT | Mod: CPTII,S$GLB,, | Performed by: NURSE PRACTITIONER

## 2025-07-28 PROCEDURE — 99999 PR PBB SHADOW E&M-EST. PATIENT-LVL V: CPT | Mod: PBBFAC,,, | Performed by: NURSE PRACTITIONER

## 2025-07-28 PROCEDURE — 1160F RVW MEDS BY RX/DR IN RCRD: CPT | Mod: CPTII,S$GLB,, | Performed by: NURSE PRACTITIONER

## 2025-07-28 PROCEDURE — G2211 COMPLEX E/M VISIT ADD ON: HCPCS | Mod: S$GLB,,, | Performed by: NURSE PRACTITIONER

## 2025-07-28 RX ORDER — METHYLPREDNISOLONE 4 MG/1
TABLET ORAL
Qty: 21 TABLET | Refills: 0 | Status: SHIPPED | OUTPATIENT
Start: 2025-07-28

## 2025-07-28 NOTE — PROGRESS NOTES
"HPI:    History of Present Illness    CHIEF COMPLAINT:  Patient presents today for shoulder pain after recent foot surgery.    MUSCULOSKELETAL:  She reports left shoulder pain that began 3-4 days ago. She describes sometimes sharp pain without any specific injury or trauma. She denies associated symptoms such as lightheadedness, dizziness, chest pain, shortness of breath, palpitations, nausea, or vomiting.    SURGICAL HISTORY:  She underwent foot surgery 3 weeks ago with taking it easy for 1 week. She is gradually returning to full mobility with mild residual foot pain. She had teeth extraction approximately 2 months ago and is awaiting dental prosthetics.    GASTROINTESTINAL:  She has chronic acid reflux managed with Protonix. She reports new pain L shoulder pain, potentially related to reflux and trapped gas.     MEDICAL HISTORY:  She has a history of borderline diabetes, currently not requiring medication.    MEDICATION SENSITIVITIES:  She is unable to take ibuprofen due to GI issues. She experiences headaches with Flonase immediately after administration and reports nasal dryness with Azelastine. No true allergic reactions are reported.       Past Medical History:   Diagnosis Date    GERD (gastroesophageal reflux disease)     Headache     Hypertension      Past Surgical History:   Procedure Laterality Date    CORRECTION OF HAMMER TOE Left 7/3/2025    Procedure: CORRECTION, HAMMER TOE;  Surgeon: Sandeep Treadwell DPM;  Location: [facility];  Service: Podiatry;  Laterality: Left;  fifth toe derotational arthroplasty no hardware needed, will need power    gastric sleeve      HYSTERECTOMY      OOPHORECTOMY       Review of patient's allergies indicates:   Allergen Reactions    Codeine     Duloxetine Other (See Comments)     Pt stated cymbalta made her feel "crazy" in head. Pt stated she is unable to describe it any other way.    Flonase [fluticasone] Other (See Comments)     Headache     Penicillins      Social " "History[1]  Family History   Problem Relation Name Age of Onset    Hypertension Mother      Hypertension Father       Medications Ordered Prior to Encounter[2]    Review of Systems   Constitutional:  Negative for appetite change, chills, diaphoresis, fatigue, fever and unexpected weight change.   HENT:  Negative for congestion, ear pain, postnasal drip, rhinorrhea, sinus pressure, sinus pain, sore throat and trouble swallowing.    Eyes:  Negative for pain, redness and visual disturbance.   Respiratory:  Negative for cough, chest tightness, shortness of breath and wheezing.    Cardiovascular:  Negative for chest pain, palpitations and leg swelling.   Gastrointestinal:  Negative for abdominal distention, abdominal pain, blood in stool, constipation, diarrhea, nausea and vomiting.        Reflux   Endocrine: Negative.    Genitourinary:  Negative for difficulty urinating, dysuria, flank pain, frequency, hematuria and urgency.   Musculoskeletal:  Positive for arthralgias. Negative for myalgias.        L shoulder pain   Skin:  Negative for pallor and rash.   Neurological:  Negative for dizziness, syncope, weakness, light-headedness, numbness and headaches.   Psychiatric/Behavioral:  Negative for dysphoric mood, sleep disturbance and suicidal ideas. The patient is not nervous/anxious.      Vitals:    07/28/25 1115   BP: 118/78   BP Location: Left arm   Patient Position: Sitting   Pulse: 84   Resp: 14   SpO2: 98%   Weight: 99.8 kg (220 lb)   Height: 5' 11" (1.803 m)     Wt Readings from Last 3 Encounters:   07/28/25 1115 99.8 kg (220 lb)   07/23/25 1139 99.8 kg (220 lb 0.3 oz)   07/16/25 1017 99.8 kg (220 lb 0.3 oz)      Physical Exam  Vitals reviewed.   Constitutional:       General: She is awake. She is not in acute distress.     Appearance: Normal appearance. She is well-developed. She is not ill-appearing.   HENT:      Head: Normocephalic and atraumatic.      Nose: Nose normal.      Mouth/Throat:      Mouth: Mucous " membranes are moist.      Pharynx: Oropharynx is clear.   Eyes:      Extraocular Movements: Extraocular movements intact.      Conjunctiva/sclera: Conjunctivae normal.      Pupils: Pupils are equal, round, and reactive to light.   Cardiovascular:      Rate and Rhythm: Normal rate and regular rhythm.      Pulses: Normal pulses.      Heart sounds: Normal heart sounds, S1 normal and S2 normal. No murmur heard.  Pulmonary:      Effort: Pulmonary effort is normal. No respiratory distress.      Breath sounds: Normal breath sounds.   Abdominal:      General: Bowel sounds are normal. There is no distension.      Palpations: Abdomen is soft.      Tenderness: There is no abdominal tenderness.   Musculoskeletal:         General: Tenderness present. Normal range of motion.      Left shoulder: Tenderness present.      Cervical back: Normal range of motion and neck supple.      Right lower leg: No edema.      Left lower leg: No edema.      Comments: +L posterior shoulder tenderness with resistance    Skin:     General: Skin is warm and dry.      Capillary Refill: Capillary refill takes less than 2 seconds.      Findings: No rash.   Neurological:      General: No focal deficit present.      Mental Status: She is alert and oriented to person, place, and time. Mental status is at baseline.      Motor: No weakness.   Psychiatric:         Attention and Perception: Attention normal.         Mood and Affect: Mood and affect normal.         Speech: Speech normal.         Behavior: Behavior normal. Behavior is cooperative.         Thought Content: Thought content normal.     Assessment/Plan:  1. Acute pain of left shoulder  Overview:  -Acute symptoms of L shoulder pain. No trauma.   -No alarm signs or symptoms present.  -Recommend alternating ice/heat. Ice first to reduce inflammation and heat second to reduce muscle spasm.  -Rest, activity as tolerated. Discussed limiting physical activity can lead to deconditioning.   -Start medrol dose  pack   -Recommend PT if still present after steroid dose pack.   -Consider referral to a specialist after 6 weeks with persistent S/S.         Orders:  -     methylPREDNISolone (MEDROL DOSEPACK) 4 mg tablet; follow package directions  Dispense: 21 tablet; Refill: 0    2. Essential hypertension  Overview:  BP Readings from Last 3 Encounters:   07/28/25 118/78   07/03/25 (!) 147/86   06/04/25 126/82     Hypertension Medications              amLODIPine (NORVASC) 2.5 MG tablet Take 1 tablet (2.5 mg total) by mouth once daily.    hydroCHLOROthiazide (HYDRODIURIL) 12.5 MG Tab TAKE 1 TABLET DAILY *THANK YOU*          -Chronic. Currently stable.   -Pt at goal today.   -Recommend home BP monitoring.  -Recommend lifestyle modifications such as smoking cessation, diet, exercise, weight loss, and limiting ETOH consumption.    -Recommend heart healthy diet (DASH/Mediterranean) that's high in fruits/veggies, whole grains, low-fat dairy, and reduced trans/saturated fats. Recommend reduced Na consumption to <2,300mg/day.   -Recommend routine exercise regimen of at least 30 mins of moderate exercise 5 days/week.  -ER precautions were given for symptoms of hypertensive urgency and emergency.       3. Hyperlipidemia, unspecified hyperlipidemia type  Overview:  -chronic condition. Currently stable.    Lab Results   Component Value Date    CHOL 160 10/09/2024    CHOL 149 04/17/2023    CHOL 161 10/20/2022     Lab Results   Component Value Date    HDL 65 10/09/2024    HDL 64 04/17/2023    HDL 65 (H) 10/20/2022     Lab Results   Component Value Date    LDLCALC 82.8 10/09/2024    LDLCALC 73.4 04/17/2023    LDLCALC 78 10/20/2022     Lab Results   Component Value Date    TRIG 61 10/09/2024    TRIG 58 04/17/2023    TRIG 89 10/20/2022       Lab Results   Component Value Date    CHOLHDL 40.6 10/09/2024    CHOLHDL 43.0 04/17/2023    CHOLHDL 2.48 10/20/2022     Changed Lipitor 40mg daily     Assessment & Plan:  Cont POC.   Will recheck Lipid panel  at next visit.       4. Gastroesophageal reflux disease without esophagitis  Overview:  Chronic.   Cont Gas x  Cont Protonix   Avoid red sauces and eating close to bedtime   -denies alarm symptoms, such as dysphagia, weight loss or N/V  -lifestyle modification with avoidance of acidic foods, caffeine, late night eating  Prn rtc       Follow up in about 4 weeks (around 8/25/2025), or if symptoms worsen or fail to improve.  Health Maintenance   Topic Date Due    TETANUS VACCINE  Never done    Mammogram  08/21/2025    Influenza Vaccine (1) 09/01/2025    Hemoglobin A1c (Prediabetes)  10/09/2025    Colorectal Cancer Screening  09/10/2026    Lipid Panel  10/09/2029    RSV Vaccine (Age 60+ and Pregnant patients) (1 - 1-dose 75+ series) 02/10/2041    Hepatitis C Screening  Completed    Shingles Vaccine  Completed    HIV Screening  Completed    COVID-19 Vaccine  Completed    Pneumococcal Vaccines (Age 50+)  Completed     Visit today included increased complexity associated with the care of the episodic problem addressed and managing the longitudinal care of the patient due to the serious and/or complex managed problem(s) as per problem list.     This note was generated with the assistance of ambient listening technology. Verbal consent was obtained by the patient and accompanying visitor(s) for the recording of patient appointment to facilitate this note. I attest to having reviewed and edited the generated note for accuracy, though some syntax or spelling errors may persist. Please contact the author of this note for any clarification.           Lucia Bird, MSN, APRN, FNP-C       [1]   Social History  Tobacco Use    Smoking status: Never    Smokeless tobacco: Never   Substance Use Topics    Alcohol use: No    Drug use: No   [2]   Current Outpatient Medications on File Prior to Visit   Medication Sig Dispense Refill    acetaminophen-codeine 300-30mg (TYLENOL #3) 300-30 mg Tab Take 1 tablet by mouth every 4 (four)  hours as needed.      amLODIPine (NORVASC) 2.5 MG tablet Take 1 tablet (2.5 mg total) by mouth once daily. 90 tablet 3    atorvastatin (LIPITOR) 40 MG tablet Take 1 tablet (40 mg total) by mouth once daily. 90 tablet 3    azelastine (ASTELIN) 137 mcg (0.1 %) nasal spray 1 spray (137 mcg total) by Nasal route 2 (two) times daily. 30 mL 3    clotrimazole-betamethasone 1-0.05% (LOTRISONE) cream Apply topically 2 (two) times daily. 45 g 0    diclofenac sodium (VOLTAREN) 1 % Gel Apply 2 g topically 2 (two) times daily as needed (for arthritis pain). 200 g 2    eletriptan (RELPAX) 40 MG tablet 1 tab PO PRN migraine. May repeat once in 2 hours. Use no more than 10 days per month. 10 tablet 11    estradioL (ESTRACE) 0.01 % (0.1 mg/gram) vaginal cream APPLY 3 GRAMS  VAGINALLY TWICE A WEEK *THANK YOU* 42.5 g 11    fluconazole (DIFLUCAN) 150 MG Tab Take 1 tab by mouth on day 1; if symptoms persist after 72 hours, take 2nd tab 2 tablet 1    fluocinolone and shower cap 0.01 % Oil       fluorometholone 0.1% (FML) 0.1 % DrpS Place into both eyes.      frovatriptan (FROVA) 2.5 MG tablet Take 1 tablet (2.5 mg total) by mouth 3 (three) times daily as needed for Migraine. If needed, can repeat a dose of your medication every 2 hours for 3 doses. Do not exceed more than 3 uses per 24 hour period. Do not mix with other triptans or DHE/ergot products (like Migranal). Do not exceed more than 10 days per month or you can develop medication-overuse headache (MOH). 10 tablet 11    hydroCHLOROthiazide (HYDRODIURIL) 12.5 MG Tab TAKE 1 TABLET DAILY *THANK YOU* 90 tablet 3    HYDROcodone-acetaminophen (NORCO) 5-325 mg per tablet Take 1 tablet by mouth every 4 (four) hours as needed for Pain. 20 tablet 0    ketoconazole (NIZORAL) 2 % shampoo Apply topically.      levalbuterol (XOPENEX) 1.25 mg/3 mL nebulizer solution Take 3 mLs (1.25 mg total) by nebulization every 6 (six) hours as needed for Wheezing. Rescue 3 mL 11    levocetirizine (XYZAL) 5  MG tablet Take 1 tablet (5 mg total) by mouth every evening. 90 tablet 3    LIDOcaine 4 % Gel Apply topically up to 4 times daily as needed to painful toe      LIDOcaine-prilocaine (EMLA) cream Apply topically as needed. 5 g 11    linaCLOtide (LINZESS) 145 mcg Cap capsule Take 1 capsule (145 mcg total) by mouth before breakfast. 90 capsule 1    nystatin (MYCOSTATIN) powder Apply topically 2 (two) times daily. 60 g 11    olopatadine (PATANOL) 0.1 % ophthalmic solution Place 1 drop into both eyes 2 (two) times daily. 5 mL 1    pantoprazole (PROTONIX) 40 MG tablet Take 1 tablet (40 mg total) by mouth once daily. 90 tablet 3    pregabalin (LYRICA) 50 MG capsule Take 1 capsule (50 mg total) by mouth 2 (two) times daily. 180 capsule 3    promethazine-dextromethorphan (PROMETHAZINE-DM) 6.25-15 mg/5 mL Syrp Take 5 mLs by mouth nightly as needed (cough). 118 mL 0    semaglutide (OZEMPIC) 2 mg/dose (8 mg/3 mL) PnIj Inject 2 mg into the skin every 7 days. 3 mL 11    simethicone (MYLICON) 125 mg Cap capsule Take 1 capsule (125 mg total) by mouth 4 (four) times daily as needed for Flatulence. 90 capsule 2    tobramycin-dexAMETHasone 0.3-0.1% (TOBRADEX) 0.3-0.1 % DrpS Place into both eyes.      traZODone (DESYREL) 50 MG tablet Take 1 tablet (50 mg total) by mouth every evening. 90 tablet 3    triamcinolone acetonide 0.1% (KENALOG) 0.1 % cream Apply topically 2 (two) times daily. 28.4 g 1    valACYclovir (VALTREX) 1000 MG tablet Take 2 tablets (2,000 mg total) by mouth every 12 (twelve) hours. For one day 4 tablet 0    oxybutynin (DITROPAN-XL) 5 MG TR24 Take 1 tablet (5 mg total) by mouth once daily. 90 tablet 3     No current facility-administered medications on file prior to visit.

## 2025-08-05 ENCOUNTER — HOSPITAL ENCOUNTER (OUTPATIENT)
Dept: RADIOLOGY | Facility: HOSPITAL | Age: 60
Discharge: HOME | End: 2025-08-05
Attending: STUDENT IN AN ORGANIZED HEALTH CARE EDUCATION/TRAINING PROGRAM
Payer: COMMERCIAL

## 2025-08-05 ENCOUNTER — OFFICE VISIT (OUTPATIENT)
Dept: FAMILY MEDICINE | Facility: CLINIC | Age: 60
End: 2025-08-05
Payer: COMMERCIAL

## 2025-08-05 VITALS
HEIGHT: 71 IN | WEIGHT: 217.81 LBS | HEART RATE: 56 BPM | DIASTOLIC BLOOD PRESSURE: 88 MMHG | OXYGEN SATURATION: 99 % | SYSTOLIC BLOOD PRESSURE: 124 MMHG | BODY MASS INDEX: 30.49 KG/M2

## 2025-08-05 DIAGNOSIS — K92.89 GAS BLOAT SYNDROME: ICD-10-CM

## 2025-08-05 DIAGNOSIS — M54.6 CHRONIC BILATERAL THORACIC BACK PAIN: ICD-10-CM

## 2025-08-05 DIAGNOSIS — M25.512 ACUTE PAIN OF LEFT SHOULDER: ICD-10-CM

## 2025-08-05 DIAGNOSIS — E66.09 CLASS 1 OBESITY DUE TO EXCESS CALORIES WITH SERIOUS COMORBIDITY AND BODY MASS INDEX (BMI) OF 30.0 TO 30.9 IN ADULT: ICD-10-CM

## 2025-08-05 DIAGNOSIS — Z12.31 ENCOUNTER FOR SCREENING MAMMOGRAM FOR MALIGNANT NEOPLASM OF BREAST: ICD-10-CM

## 2025-08-05 DIAGNOSIS — G89.29 CHRONIC BILATERAL THORACIC BACK PAIN: ICD-10-CM

## 2025-08-05 DIAGNOSIS — M25.512 ACUTE PAIN OF LEFT SHOULDER: Primary | ICD-10-CM

## 2025-08-05 DIAGNOSIS — Z76.0 MEDICATION REFILL: ICD-10-CM

## 2025-08-05 DIAGNOSIS — J30.1 SEASONAL ALLERGIC RHINITIS DUE TO POLLEN: ICD-10-CM

## 2025-08-05 DIAGNOSIS — E66.811 CLASS 1 OBESITY DUE TO EXCESS CALORIES WITH SERIOUS COMORBIDITY AND BODY MASS INDEX (BMI) OF 30.0 TO 30.9 IN ADULT: ICD-10-CM

## 2025-08-05 PROCEDURE — 3079F DIAST BP 80-89 MM HG: CPT | Mod: CPTII,S$GLB,, | Performed by: STUDENT IN AN ORGANIZED HEALTH CARE EDUCATION/TRAINING PROGRAM

## 2025-08-05 PROCEDURE — 72070 X-RAY EXAM THORAC SPINE 2VWS: CPT | Mod: TC,PO

## 2025-08-05 PROCEDURE — 99999 PR PBB SHADOW E&M-EST. PATIENT-LVL IV: CPT | Mod: PBBFAC,,, | Performed by: STUDENT IN AN ORGANIZED HEALTH CARE EDUCATION/TRAINING PROGRAM

## 2025-08-05 PROCEDURE — 99214 OFFICE O/P EST MOD 30 MIN: CPT | Mod: S$GLB,,, | Performed by: STUDENT IN AN ORGANIZED HEALTH CARE EDUCATION/TRAINING PROGRAM

## 2025-08-05 PROCEDURE — 73030 X-RAY EXAM OF SHOULDER: CPT | Mod: 26,LT,, | Performed by: RADIOLOGY

## 2025-08-05 PROCEDURE — G2211 COMPLEX E/M VISIT ADD ON: HCPCS | Mod: S$GLB,,, | Performed by: STUDENT IN AN ORGANIZED HEALTH CARE EDUCATION/TRAINING PROGRAM

## 2025-08-05 PROCEDURE — 1160F RVW MEDS BY RX/DR IN RCRD: CPT | Mod: CPTII,S$GLB,, | Performed by: STUDENT IN AN ORGANIZED HEALTH CARE EDUCATION/TRAINING PROGRAM

## 2025-08-05 PROCEDURE — 1159F MED LIST DOCD IN RCRD: CPT | Mod: CPTII,S$GLB,, | Performed by: STUDENT IN AN ORGANIZED HEALTH CARE EDUCATION/TRAINING PROGRAM

## 2025-08-05 PROCEDURE — 3008F BODY MASS INDEX DOCD: CPT | Mod: CPTII,S$GLB,, | Performed by: STUDENT IN AN ORGANIZED HEALTH CARE EDUCATION/TRAINING PROGRAM

## 2025-08-05 PROCEDURE — 73030 X-RAY EXAM OF SHOULDER: CPT | Mod: TC,PO,LT

## 2025-08-05 PROCEDURE — 72070 X-RAY EXAM THORAC SPINE 2VWS: CPT | Mod: 26,,, | Performed by: RADIOLOGY

## 2025-08-05 PROCEDURE — 3074F SYST BP LT 130 MM HG: CPT | Mod: CPTII,S$GLB,, | Performed by: STUDENT IN AN ORGANIZED HEALTH CARE EDUCATION/TRAINING PROGRAM

## 2025-08-05 RX ORDER — CETIRIZINE HYDROCHLORIDE 10 MG/1
10 TABLET ORAL DAILY
Qty: 90 TABLET | Refills: 3 | Status: SHIPPED | OUTPATIENT
Start: 2025-08-05 | End: 2026-08-05

## 2025-08-18 ENCOUNTER — OFFICE VISIT (OUTPATIENT)
Dept: PODIATRY | Facility: CLINIC | Age: 60
End: 2025-08-18
Payer: COMMERCIAL

## 2025-08-18 ENCOUNTER — OFFICE VISIT (OUTPATIENT)
Dept: ORTHOPEDICS | Facility: CLINIC | Age: 60
End: 2025-08-18
Payer: COMMERCIAL

## 2025-08-18 VITALS — HEIGHT: 71 IN | BODY MASS INDEX: 30.49 KG/M2 | WEIGHT: 217.81 LBS

## 2025-08-18 DIAGNOSIS — L84 CORN OR CALLUS: ICD-10-CM

## 2025-08-18 DIAGNOSIS — M19.012 ARTHRITIS OF LEFT SHOULDER: Primary | ICD-10-CM

## 2025-08-18 DIAGNOSIS — M20.5X2 ADDUCTOVARUS ROTATION OF TOE, ACQUIRED, LEFT: ICD-10-CM

## 2025-08-18 DIAGNOSIS — M20.42 HAMMER TOE OF LEFT FOOT: ICD-10-CM

## 2025-08-18 PROCEDURE — 99203 OFFICE O/P NEW LOW 30 MIN: CPT | Mod: S$GLB,,, | Performed by: ORTHOPAEDIC SURGERY

## 2025-08-18 PROCEDURE — 99999 PR PBB SHADOW E&M-EST. PATIENT-LVL II: CPT | Mod: PBBFAC,,, | Performed by: ORTHOPAEDIC SURGERY

## 2025-08-18 PROCEDURE — 99999 PR PBB SHADOW E&M-EST. PATIENT-LVL IV: CPT | Mod: PBBFAC,,, | Performed by: PODIATRIST

## 2025-08-18 RX ORDER — DEXAMETHASONE SODIUM PHOSPHATE 4 MG/ML
2 INJECTION, SOLUTION INTRA-ARTICULAR; INTRALESIONAL; INTRAMUSCULAR; INTRAVENOUS; SOFT TISSUE ONCE
Status: COMPLETED | OUTPATIENT
Start: 2025-08-18 | End: 2025-08-18

## 2025-08-18 RX ADMIN — DEXAMETHASONE SODIUM PHOSPHATE 2 MG: 4 INJECTION, SOLUTION INTRA-ARTICULAR; INTRALESIONAL; INTRAMUSCULAR; INTRAVENOUS; SOFT TISSUE at 10:08

## 2025-08-28 ENCOUNTER — TELEPHONE (OUTPATIENT)
Dept: FAMILY MEDICINE | Facility: CLINIC | Age: 60
End: 2025-08-28
Payer: COMMERCIAL

## 2025-08-28 ENCOUNTER — HOSPITAL ENCOUNTER (OUTPATIENT)
Dept: RADIOLOGY | Facility: HOSPITAL | Age: 60
Discharge: HOME | End: 2025-08-28
Attending: STUDENT IN AN ORGANIZED HEALTH CARE EDUCATION/TRAINING PROGRAM
Payer: COMMERCIAL

## 2025-08-28 ENCOUNTER — OFFICE VISIT (OUTPATIENT)
Dept: FAMILY MEDICINE | Facility: CLINIC | Age: 60
End: 2025-08-28
Payer: COMMERCIAL

## 2025-08-28 VITALS
OXYGEN SATURATION: 99 % | WEIGHT: 217.81 LBS | BODY MASS INDEX: 30.49 KG/M2 | DIASTOLIC BLOOD PRESSURE: 78 MMHG | SYSTOLIC BLOOD PRESSURE: 108 MMHG | HEART RATE: 68 BPM | TEMPERATURE: 98 F | HEIGHT: 71 IN

## 2025-08-28 DIAGNOSIS — B37.9 YEAST INFECTION: ICD-10-CM

## 2025-08-28 DIAGNOSIS — Z77.29 EXPOSURE TO SMOKE FROM INDUSTRIAL SOURCE: ICD-10-CM

## 2025-08-28 DIAGNOSIS — Z12.31 ENCOUNTER FOR SCREENING MAMMOGRAM FOR MALIGNANT NEOPLASM OF BREAST: ICD-10-CM

## 2025-08-28 DIAGNOSIS — J32.9 SINUSITIS, UNSPECIFIED CHRONICITY, UNSPECIFIED LOCATION: Primary | ICD-10-CM

## 2025-08-28 LAB
CTP QC/QA: YES
CTP QC/QA: YES
POC MOLECULAR INFLUENZA A AGN: NEGATIVE
POC MOLECULAR INFLUENZA B AGN: NEGATIVE
SARS-COV-2 RDRP RESP QL NAA+PROBE: NEGATIVE

## 2025-08-28 PROCEDURE — 77063 BREAST TOMOSYNTHESIS BI: CPT | Mod: 26,,, | Performed by: RADIOLOGY

## 2025-08-28 PROCEDURE — 77063 BREAST TOMOSYNTHESIS BI: CPT | Mod: TC,PO

## 2025-08-28 PROCEDURE — 77067 SCR MAMMO BI INCL CAD: CPT | Mod: 26,,, | Performed by: RADIOLOGY

## 2025-08-28 PROCEDURE — 99999 PR PBB SHADOW E&M-EST. PATIENT-LVL V: CPT | Mod: PBBFAC,,, | Performed by: NURSE PRACTITIONER

## 2025-08-28 RX ORDER — FLUTICASONE PROPIONATE 50 MCG
SPRAY, SUSPENSION (ML) NASAL
COMMUNITY
Start: 2025-08-26

## 2025-08-28 RX ORDER — MECLIZINE HYDROCHLORIDE 25 MG/1
25 TABLET ORAL 2 TIMES DAILY PRN
Qty: 14 TABLET | Refills: 0 | Status: SHIPPED | OUTPATIENT
Start: 2025-08-28 | End: 2025-09-04

## 2025-08-28 RX ORDER — FLUCONAZOLE 150 MG/1
150 TABLET ORAL DAILY
Qty: 1 TABLET | Refills: 0 | Status: SHIPPED | OUTPATIENT
Start: 2025-08-28 | End: 2025-08-29

## 2025-08-28 RX ORDER — AZITHROMYCIN 250 MG/1
TABLET, FILM COATED ORAL
Qty: 6 TABLET | Refills: 0 | Status: SHIPPED | OUTPATIENT
Start: 2025-08-28 | End: 2025-09-02

## 2025-08-29 ENCOUNTER — TELEPHONE (OUTPATIENT)
Dept: FAMILY MEDICINE | Facility: CLINIC | Age: 60
End: 2025-08-29
Payer: COMMERCIAL

## 2025-08-29 DIAGNOSIS — M17.0 PRIMARY OSTEOARTHRITIS OF BOTH KNEES: Primary | ICD-10-CM

## 2025-08-29 DIAGNOSIS — Z76.0 MEDICATION REFILL: ICD-10-CM

## 2025-09-03 ENCOUNTER — HOSPITAL ENCOUNTER (OUTPATIENT)
Dept: RADIOLOGY | Facility: HOSPITAL | Age: 60
Discharge: HOME | End: 2025-09-03
Attending: ORTHOPAEDIC SURGERY
Payer: COMMERCIAL

## 2025-09-03 ENCOUNTER — OFFICE VISIT (OUTPATIENT)
Dept: ORTHOPEDICS | Facility: CLINIC | Age: 60
End: 2025-09-03
Payer: COMMERCIAL

## 2025-09-03 ENCOUNTER — TELEPHONE (OUTPATIENT)
Dept: PHARMACY | Facility: CLINIC | Age: 60
End: 2025-09-03
Payer: COMMERCIAL

## 2025-09-03 DIAGNOSIS — M25.561 ACUTE PAIN OF RIGHT KNEE: Primary | ICD-10-CM

## 2025-09-03 DIAGNOSIS — M17.0 PRIMARY OSTEOARTHRITIS OF BOTH KNEES: ICD-10-CM

## 2025-09-03 PROCEDURE — 73562 X-RAY EXAM OF KNEE 3: CPT | Mod: 26,RT,, | Performed by: RADIOLOGY

## 2025-09-03 PROCEDURE — 73562 X-RAY EXAM OF KNEE 3: CPT | Mod: TC,PO,RT

## 2025-09-03 PROCEDURE — 99999 PR PBB SHADOW E&M-EST. PATIENT-LVL I: CPT | Mod: PBBFAC,,, | Performed by: ORTHOPAEDIC SURGERY

## 2025-09-03 PROCEDURE — 99213 OFFICE O/P EST LOW 20 MIN: CPT | Mod: S$GLB,,, | Performed by: ORTHOPAEDIC SURGERY

## 2025-09-03 PROCEDURE — 73560 X-RAY EXAM OF KNEE 1 OR 2: CPT | Mod: 26,LT,, | Performed by: RADIOLOGY

## (undated) DEVICE — BANDAGE ESMARK LATEX FREE 4INX

## (undated) DEVICE — KIT SAHARA DRAPE DRAW/LIFT

## (undated) DEVICE — SPONGE COTTON TRAY 4X4IN

## (undated) DEVICE — GLOVE SENSICARE PI MICRO 7

## (undated) DEVICE — APPLICATOR CHLORAPREP ORN 26ML

## (undated) DEVICE — BANDAGE MATRIX HK LOOP 4IN 5YD

## (undated) DEVICE — DRAPE THREE-QTR REINF 53X77IN

## (undated) DEVICE — NDL HYPO REG 25G X 1 1/2

## (undated) DEVICE — Device

## (undated) DEVICE — GLOVE SENSICARE PI GRN 7.5

## (undated) DEVICE — HANDLE SURG LIGHT NONRIGID

## (undated) DEVICE — PAD CAST SPECIALIST STRL 4

## (undated) DEVICE — BLADE SURG #15 CARBON STEEL

## (undated) DEVICE — DRAPE T EXTRM SURG 121X128X90

## (undated) DEVICE — TOWEL OR DISP STRL BLUE 4/PK

## (undated) DEVICE — PENCIL ROCKER SWITCH 10FT CORD

## (undated) DEVICE — ELECTRODE REM PLYHSV RETURN 9

## (undated) DEVICE — STRAP OR TABLE 5IN X 72IN

## (undated) DEVICE — SUT ETHILON 4-0 PS2 18 BLK

## (undated) DEVICE — STOCKINET 4INX48

## (undated) DEVICE — SOL IRR 0.9% NACL 500ML PB

## (undated) DEVICE — DRESSING XEROFORM NONADH 1X8IN